# Patient Record
Sex: FEMALE | Race: WHITE | NOT HISPANIC OR LATINO | Employment: OTHER | URBAN - METROPOLITAN AREA
[De-identification: names, ages, dates, MRNs, and addresses within clinical notes are randomized per-mention and may not be internally consistent; named-entity substitution may affect disease eponyms.]

---

## 2017-01-18 ENCOUNTER — OFFICE VISIT (OUTPATIENT)
Dept: ENDOCRINOLOGY | Facility: CLINIC | Age: 62
End: 2017-01-18

## 2017-01-18 VITALS
BODY MASS INDEX: 40.66 KG/M2 | WEIGHT: 253 LBS | HEIGHT: 66 IN | OXYGEN SATURATION: 95 % | SYSTOLIC BLOOD PRESSURE: 136 MMHG | DIASTOLIC BLOOD PRESSURE: 80 MMHG | HEART RATE: 68 BPM

## 2017-01-18 DIAGNOSIS — E55.9 VITAMIN D DEFICIENCY: ICD-10-CM

## 2017-01-18 DIAGNOSIS — D35.02 ADRENAL CORTICAL ADENOMA OF LEFT ADRENAL GLAND: ICD-10-CM

## 2017-01-18 DIAGNOSIS — E11.9 CONTROLLED TYPE 2 DIABETES MELLITUS WITHOUT COMPLICATION, WITHOUT LONG-TERM CURRENT USE OF INSULIN (HCC): ICD-10-CM

## 2017-01-18 DIAGNOSIS — I10 ESSENTIAL HYPERTENSION: ICD-10-CM

## 2017-01-18 DIAGNOSIS — J41.0 SIMPLE CHRONIC BRONCHITIS (HCC): ICD-10-CM

## 2017-01-18 DIAGNOSIS — R60.0 LOCALIZED EDEMA: Primary | ICD-10-CM

## 2017-01-18 PROCEDURE — 99214 OFFICE O/P EST MOD 30 MIN: CPT | Performed by: INTERNAL MEDICINE

## 2017-01-18 RX ORDER — ALBUTEROL SULFATE 90 UG/1
2 AEROSOL, METERED RESPIRATORY (INHALATION) EVERY 4 HOURS PRN
Qty: 1 INHALER | Refills: 2 | Status: SHIPPED | OUTPATIENT
Start: 2017-01-18 | End: 2018-07-25 | Stop reason: ALTCHOICE

## 2017-01-18 NOTE — PROGRESS NOTES
Chief complaint  Adrenal Problem (F/u for adrenal adenoma and type 2 diabetes, not currently checking blood sugars. ) and Diabetes    Subjective   Anny Santos is a 61 y.o. female is here today for follow-up.  Follow-up for adrenal tumor. Patient has adrenal incidentaloma found on CT scan ordered for pulmonary nodules on dec 2012. Repeat CT scan 6 months later showed unchanged 3.5 cm adrenal adenoma with benign radiologic characteristics.   Last CT adrenal 10/21/2016 - Stable 3.7 cm low-density  left adrenal nodule. Hounsfield units again average less than 10. The  kidneys are unremarkable in appearance.  Adrenal Mass: The patient is being seen for a routine clinic follow-up of an adrenal mass. She was referred by a primary care provider. The adrenal mass has been visualized on abdominal CT. The mass is 3.5 cm in size and located in the left adrenal gland. The mass has a benign imaging phenotype.  Symptoms: weakness, palpitations, lightheadedness and dyspnea, but no headache and no rash    The patient presents with complaints of gradual onset of moderate facial, chin and upper lip hirsutism (started at menopause. Patient removes hair every other day). The patient is currently experiencing symptoms. Pertinent medical history: impaired glucose tolerance, hypertension and hyperlipidemia.     Patient has multiple medical problems, including hypertension, hyperlipidemia, asthma with COPD, SVT, impaired glucose tolerance. Patient took high doses of prednisone in 2013, which resulted in significant weight gain, uncontrolled hypertension and worsening edema.     Patient had pacemaker placement and BP is controlled on current regimen, reviewed log book. .Her diabetes is well controlled. Last A1C 6.2  She reported that her control is likely worsened because of dietary non compliance.     C/o cough with white sputum production. It persisted for 3 weeks since the last URI>     Diabetes   She presents for her follow-up  diabetic visit. She has type 2 diabetes mellitus. Associated symptoms include fatigue and weakness.       Medications    Current Outpatient Prescriptions:   •  albuterol (PROVENTIL HFA;VENTOLIN HFA) 108 (90 BASE) MCG/ACT inhaler, Inhale 2 puffs Every 4 (Four) Hours As Needed for wheezing., Disp: 1 inhaler, Rfl: 2  •  aspirin 325 MG tablet, Take 1 tablet by mouth daily., Disp: , Rfl:   •  atorvastatin (LIPITOR) 40 MG tablet, Take  by mouth., Disp: , Rfl:   •  Cholecalciferol (VITAMIN D-3 PO), Take  by mouth., Disp: , Rfl:   •  Coenzyme Q10 (COQ-10) 10 MG capsule, Take  by mouth., Disp: , Rfl:   •  Cyanocobalamin (VITAMIN B-12 PO), Take  by mouth., Disp: , Rfl:   •  diphenhydrAMINE (BENADRYL) 50 MG tablet, Take 1 tablet 1 hour before the procedure., Disp: 1 tablet, Rfl: 0  •  fluticasone (FLONASE) 50 MCG/ACT nasal spray, 2 sprays into each nostril daily. Administer 2 sprays in each nostril for each dose., Disp: , Rfl:   •  furosemide (LASIX) 20 MG tablet, Take 0.5 tablets by mouth 3 (three) times a day., Disp: , Rfl:   •  glucose blood (ACCU-CHEK EWELINA PLUS) test strip, Use 2-3 times a week, Disp: , Rfl:   •  ipratropium-albuterol (DUO-NEB) 0.5-2.5 mg/mL nebulizer, Administer one 3 ML vial 4 times daily via Nebulization as needed for shortness of breath, wheezing, Disp: , Rfl:   •  magnesium oxide (MAG-OX) 400 MG tablet, Take 1 tablet by mouth 2 (two) times a day., Disp: , Rfl:   •  metFORMIN (GLUCOPHAGE) 500 MG tablet, Take 1 tablet by mouth daily with breakfast., Disp: 90 tablet, Rfl: 3  •  metoclopramide (REGLAN) 5 MG tablet, Take  by mouth., Disp: , Rfl:   •  metolazone (ZAROXOLYN) 5 MG tablet, Take 1 tablet by mouth See Admin Instructions. Monday Wednesday and Friday, Disp: 20 tablet, Rfl: 5  •  Omega-3 Fatty Acids (FISH OIL PO), Take  by mouth., Disp: , Rfl:   •  potassium chloride (K-DUR,KLOR-CON) 10 MEQ CR tablet, Take 1 tablet by mouth 3 (Three) Times a Day., Disp: 270 tablet, Rfl: 1  •  predniSONE  "(DELTASONE) 50 MG tablet, Take 1 tablet 13 hours before procedure, 1 tablet 7 hours before procedure, 1 tablet 1 hour before procedure., Disp: 3 tablet, Rfl: 0  •  sotalol (BETAPACE AF) 80 MG tablet tablet, Take  by mouth., Disp: , Rfl:   •  Tiotropium Bromide Monohydrate (SPIRIVA RESPIMAT) 2.5 MCG/ACT aerosol solution, 2 puffs daily., Disp: , Rfl:   •  VITAMIN D, ERGOCALCIFEROL, PO, Take 5,000 mg by mouth daily., Disp: , Rfl:     PMH  The following portions of the patient's history were reviewed and updated as appropriate: allergies, current medications, past family history, past medical history, past social history, past surgical history and problem list.    Review of systems  Review of Systems   Constitutional: Positive for fatigue.   Respiratory: Positive for cough (after URI) and shortness of breath.    Cardiovascular: Positive for palpitations and leg swelling.   Gastrointestinal: Positive for constipation.   Musculoskeletal: Positive for gait problem.   Neurological: Positive for weakness.       Physical exam  Objective   Blood pressure 136/80, pulse 68, height 66\" (167.6 cm), weight 253 lb (115 kg), SpO2 95 %.   Physical Exam   Constitutional: She is oriented to person, place, and time. She appears well-developed and well-nourished.   HENT:   Head: Normocephalic and atraumatic.   Eyes: Conjunctivae are normal.   Neck: No thyromegaly present.   The neck is supple and free of adenopathy or masses, the thyroid is normal without enlargement or nodules.   Cardiovascular: Normal rate, regular rhythm and normal heart sounds.    Pulmonary/Chest: Effort normal.   Decreased air entry bilaterally   Musculoskeletal: She exhibits no edema.   Lymphadenopathy:     She has no cervical adenopathy.   Neurological: She is alert and oriented to person, place, and time.   Skin: Skin is warm and dry. No rash noted.   Psychiatric: She has a normal mood and affect. Thought content normal.   Vitals reviewed.        LABS AND " IMAGING    Results for orders placed or performed in visit on 10/21/16   POC Glycosylated Hemoglobin (Hb A1C)   Result Value Ref Range    Hemoglobin A1C 6.2 %   POC Glucose Fingerstick   Result Value Ref Range    Glucose 143 (A) 70 - 130 mg/dL          Assessment  Assessment/Plan   1. Localized edema    2. Adrenal cortical adenoma of left adrenal gland    3. Vitamin D deficiency    4. Simple chronic bronchitis    5. Essential hypertension    6. Controlled type 2 diabetes mellitus without complication, without long-term current use of insulin      No orders of the defined types were placed in this encounter.    Plan  Cont metolazone 5 mg Mon Wed Fri, lasix 40 mg a day. Potassium 3 tabs.  She is doing better now and numbers of BP improved.  Log book reviewed. Advised to elevate legs and keep sodium intake down.     Diabetes mellitus type 2 is well controlled on diet and metformin 500 mg daily. Repeat A1C and will notify if needs to increase the dose.     Adrenal left nodule - repeat CT without contrast with the special attention to the adrenal and assessment of Hounsfield units and size of the mass - stable 3.5 cm adrenal adenoma with low Hounsfield units.   Biochemical panel was negative 5/2015. Will obtain before next visit.   Follow-up in 8-12 months.

## 2017-01-18 NOTE — MR AVS SNAPSHOT
Anny CRAFT Santos   1/18/2017 3:15 PM   Office Visit    Dept Phone:  [phone]   Encounter #:  [id number]    Provider:  Alejandra SCHULZ MD   Department:  [facility] INTERNAL MEDICINE AND ENDOCRINOLOGY                Your Full Care Plan              Today's Medication Changes          These changes are accurate as of: 1/18/17  4:19 PM.  If you have any questions, ask your nurse or doctor.               New Medication(s)Ordered:     albuterol 108 (90 BASE) MCG/ACT inhaler   Commonly known as:  PROVENTIL HFA;VENTOLIN HFA   Inhale 2 puffs Every 4 (Four) Hours As Needed for wheezing.   Started by:  Alejandra SCHULZ MD         Stop taking medication(s)listed here:     lisinopril 40 MG tablet   Commonly known as:  PRINIVIL,ZESTRIL   Stopped by:  Alejandra SCHULZ MD                Where to Get Your Medications      These medications were sent to [facility] - [facility] [address] [facility] [phone] [facility] [phone]   [address] [facility] [address]     Phone:  [phone]     albuterol 108 (90 BASE) MCG/ACT inhaler                  Your Updated Medication List          This list is accurate as of: 1/18/17  4:19 PM.  Always use your most recent med list.                ACCU-CHEK EWELINA PLUS test strip   Generic drug:  glucose blood       albuterol 108 (90 BASE) MCG/ACT inhaler   Commonly known as:  PROVENTIL HFA;VENTOLIN HFA   Inhale 2 puffs Every 4 (Four) Hours As Needed for wheezing.       aspirin 325 MG tablet       atorvastatin 40 MG tablet   Commonly known as:  LIPITOR       CoQ-10 10 MG capsule       diphenhydrAMINE 50 MG tablet   Commonly known as:  BENADRYL   Take 1 tablet 1 hour before the procedure.       FISH OIL PO       fluticasone 50 MCG/ACT nasal spray   Commonly known as:  FLONASE       furosemide 20 MG tablet   Commonly known as:  LASIX       ipratropium-albuterol 0.5-2.5 mg/mL nebulizer   Commonly known as:  DUO-NEB       magnesium oxide 400 MG tablet   Commonly known as:  MAG-OX       metFORMIN 500 MG tablet   Commonly known as:  GLUCOPHAGE   Take 1 tablet by mouth daily with breakfast.       metoclopramide 5 MG tablet   Commonly known as:  REGLAN       metOLazone 5 MG tablet   Commonly known as:  ZAROXOLYN   Take 1 tablet by mouth See Admin Instructions. Monday Wednesday and Friday       potassium chloride 10 MEQ CR tablet   Commonly known as:  K-DUR,KLOR-CON   Take 1 tablet by mouth 3 (Three) Times a Day.       predniSONE 50 MG tablet   Commonly known as:  DELTASONE   Take 1 tablet 13 hours before procedure, 1 tablet 7 hours before procedure, 1 tablet 1 hour before procedure.       sotalol 80 MG tablet tablet   Commonly known as:  BETAPACE AF       SPIRIVA RESPIMAT 2.5 MCG/ACT aerosol solution   Generic drug:  Tiotropium Bromide Monohydrate       VITAMIN B-12 PO       VITAMIN D (ERGOCALCIFEROL) PO       VITAMIN D-3 PO               You Were Diagnosed With        Codes Comments    Localized edema    -  Primary ICD-10-CM: R60.0  ICD-9-CM: 782.3     Adrenal cortical adenoma of left adrenal gland     ICD-10-CM: D35.02  ICD-9-CM: 227.0     Vitamin D deficiency     ICD-10-CM: E55.9  ICD-9-CM: 268.9     Simple chronic bronchitis     ICD-10-CM: J41.0  ICD-9-CM: 491.0     Essential hypertension     ICD-10-CM: I10  ICD-9-CM: 401.9     Controlled type 2 diabetes mellitus without complication, without long-term current use of insulin     ICD-10-CM: E11.9  ICD-9-CM: 250.00       Instructions     None    Patient Instructions History      Upcoming Appointments     Visit Type Date Time Department    FOLLOW UP 1/18/2017  3:15 PM [facility]    FOLLOW UP 7/20/2017  9:00 AM [facility]     Our records indicate that you have an active [employer] account.    You can view your After Visit Summary by going to [employer] and logging in with your [employer] username and password.  If you don't have a [employer]  "username and password but a parent or guardian has access to your record, the parent or guardian should login with their own [employer] username and password and access your record to view the After Visit Summary.    If you have questions, you can email [email or web address] or call [phone] to talk to our [employer] staff.  Remember, [employer] is NOT to be used for urgent needs.  For medical emergencies, dial 911.               Other Info from Your Visit           Your Appointments     Jul 20, 2017  9:00 AM EDT   Follow Up with Alejandra SCHULZ MD   [facility] INTERNAL MEDICINE AND ENDOCRINOLOGY (--)    [address] [address] [phone]           Arrive 15 minutes prior to appointment.              Allergies     Fish-derived Products      Iodinated Diagnostic Agents  Itching    Minocin [Minocycline Hcl]      Other      Albuterol  Palpitations      Reason for Visit     Adrenal Problem F/u for adrenal adenoma and type 2 diabetes, not currently checking blood sugars.     Diabetes           Vital Signs     Blood Pressure Pulse Height Weight Oxygen Saturation Body Mass Index    136/80 68 66\" (167.6 cm) 253 lb (115 kg) 95% 40.84 kg/m2    Smoking Status                   Former Smoker           Problems and Diagnoses Noted     Adrenal cortical adenoma of left adrenal gland    Chronic bronchitis    Controlled type 2 diabetes mellitus without complication, without long-term current use of insulin    High blood pressure    Accumulation of fluid in tissues    Vitamin D deficiency        "

## 2017-02-15 RX ORDER — FUROSEMIDE 20 MG/1
10 TABLET ORAL 3 TIMES DAILY
Qty: 90 TABLET | Refills: 1 | Status: SHIPPED | OUTPATIENT
Start: 2017-02-15 | End: 2017-02-23 | Stop reason: ALTCHOICE

## 2017-02-23 ENCOUNTER — TELEPHONE (OUTPATIENT)
Dept: ENDOCRINOLOGY | Facility: CLINIC | Age: 62
End: 2017-02-23

## 2017-02-23 RX ORDER — FUROSEMIDE 20 MG/1
20 TABLET ORAL 3 TIMES DAILY
Qty: 270 TABLET | Refills: 1 | Status: SHIPPED | OUTPATIENT
Start: 2017-02-23 | End: 2017-03-21 | Stop reason: SDUPTHER

## 2017-02-23 NOTE — TELEPHONE ENCOUNTER
"----- Message from Alejandra SCHULZ MD sent at 2/23/2017  1:45 PM EST -----  Regarding: FW: Prescription Question  Contact: [phone]  I believe it was 1/2 tab of 40 mg daily. Could you please send her correct dose 20 mg 1 tab 3x daily  ----- Message -----     From: Anny Santos     Sent: 2/22/2017   4:55 PM       To: Alejandra SCHULZ MD  Subject: Prescription Question                            I called a couple of weeks ago and ask for my Lasix to be called in. I take Lasix 20 mg. 1 tab three times a day. When I got the medicine just now it said, \"Take 1/2 tablet three times a day.\"  Did Dr. Vela decrease my dosage? I only got 1/2 the pills I needed to last 90 days. Thank you so much. Anny Santos 1955. Member #[id number].    "

## 2017-03-21 RX ORDER — FUROSEMIDE 20 MG/1
20 TABLET ORAL 3 TIMES DAILY
Qty: 270 TABLET | Refills: 0 | Status: SHIPPED | OUTPATIENT
Start: 2017-03-21 | End: 2017-03-23 | Stop reason: SDUPTHER

## 2017-03-22 ENCOUNTER — HOSPITAL ENCOUNTER (EMERGENCY)
Facility: HOSPITAL | Age: 62
Discharge: HOME | End: 2017-03-22
Attending: EMERGENCY MEDICINE | Admitting: EMERGENCY MEDICINE

## 2017-03-22 VITALS
TEMPERATURE: 99.6 F | DIASTOLIC BLOOD PRESSURE: 65 MMHG | RESPIRATION RATE: 18 BRPM | HEIGHT: 65 IN | WEIGHT: 251 LBS | OXYGEN SATURATION: 92 % | HEART RATE: 74 BPM | BODY MASS INDEX: 41.82 KG/M2 | SYSTOLIC BLOOD PRESSURE: 112 MMHG

## 2017-03-22 DIAGNOSIS — E87.6 HYPOKALEMIA: Primary | ICD-10-CM

## 2017-03-22 LAB
ALBUMIN SERPL-MCNC: 4.5 G/DL (ref 3.5–5)
ALBUMIN SERPL-MCNC: 4.7 G/DL (ref 3.5–5)
ALBUMIN/GLOB SERPL: 1.3 G/DL (ref 1–2)
ALBUMIN/GLOB SERPL: 1.5 G/DL (ref 1–2)
ALP SERPL-CCNC: 78 U/L (ref 38–126)
ALP SERPL-CCNC: 81 U/L (ref 38–126)
ALT SERPL W P-5'-P-CCNC: 26 U/L (ref 13–69)
ALT SERPL W P-5'-P-CCNC: 27 U/L (ref 13–69)
ANION GAP SERPL CALCULATED.3IONS-SCNC: 17.5 MMOL/L
ANION GAP SERPL CALCULATED.3IONS-SCNC: 9.7 MMOL/L
AST SERPL-CCNC: 22 U/L (ref 15–46)
AST SERPL-CCNC: 37 U/L (ref 15–46)
BILIRUB SERPL-MCNC: 1.2 MG/DL (ref 0.2–1.3)
BILIRUB SERPL-MCNC: 1.6 MG/DL (ref 0.2–1.3)
BUN BLD-MCNC: 22 MG/DL (ref 7–20)
BUN BLD-MCNC: 22 MG/DL (ref 7–20)
BUN/CREAT SERPL: 22 (ref 7.1–23.5)
BUN/CREAT SERPL: 27.5 (ref 7.1–23.5)
CALCIUM SPEC-SCNC: 10.3 MG/DL (ref 8.4–10.2)
CALCIUM SPEC-SCNC: 10.5 MG/DL (ref 8.4–10.2)
CHLORIDE SERPL-SCNC: 84 MMOL/L (ref 98–107)
CHLORIDE SERPL-SCNC: 84 MMOL/L (ref 98–107)
CO2 SERPL-SCNC: 37 MMOL/L (ref 26–30)
CO2 SERPL-SCNC: 42 MMOL/L (ref 26–30)
CREAT BLD-MCNC: 0.8 MG/DL (ref 0.6–1.3)
CREAT BLD-MCNC: 1 MG/DL (ref 0.6–1.3)
GFR SERPL CREATININE-BSD FRML MDRD: 56 ML/MIN/1.73
GFR SERPL CREATININE-BSD FRML MDRD: 72 ML/MIN/1.73
GLOBULIN UR ELPH-MCNC: 3.1 GM/DL
GLOBULIN UR ELPH-MCNC: 3.5 GM/DL
GLUCOSE BLD-MCNC: 146 MG/DL (ref 74–98)
GLUCOSE BLD-MCNC: 184 MG/DL (ref 74–98)
HOLD SPECIMEN: NORMAL
HOLD SPECIMEN: NORMAL
POTASSIUM BLD-SCNC: 2.7 MMOL/L (ref 3.5–5.1)
POTASSIUM BLD-SCNC: 3.5 MMOL/L (ref 3.5–5.1)
PROT SERPL-MCNC: 7.6 G/DL (ref 6.3–8.2)
PROT SERPL-MCNC: 8.2 G/DL (ref 6.3–8.2)
SODIUM BLD-SCNC: 133 MMOL/L (ref 137–145)
SODIUM BLD-SCNC: 135 MMOL/L (ref 137–145)
WHOLE BLOOD HOLD SPECIMEN: NORMAL
WHOLE BLOOD HOLD SPECIMEN: NORMAL

## 2017-03-22 PROCEDURE — 80053 COMPREHEN METABOLIC PANEL: CPT | Performed by: PHYSICIAN ASSISTANT

## 2017-03-22 PROCEDURE — 99283 EMERGENCY DEPT VISIT LOW MDM: CPT

## 2017-03-22 PROCEDURE — 93005 ELECTROCARDIOGRAM TRACING: CPT | Performed by: PHYSICIAN ASSISTANT

## 2017-03-22 PROCEDURE — 25010000003 POTASSIUM CHLORIDE 10 MEQ/100ML SOLUTION: Performed by: PHYSICIAN ASSISTANT

## 2017-03-22 PROCEDURE — 96365 THER/PROPH/DIAG IV INF INIT: CPT

## 2017-03-22 PROCEDURE — 80053 COMPREHEN METABOLIC PANEL: CPT | Performed by: EMERGENCY MEDICINE

## 2017-03-22 PROCEDURE — 36415 COLL VENOUS BLD VENIPUNCTURE: CPT

## 2017-03-22 PROCEDURE — 96366 THER/PROPH/DIAG IV INF ADDON: CPT

## 2017-03-22 RX ORDER — POTASSIUM CHLORIDE 7.45 MG/ML
10 INJECTION INTRAVENOUS
Status: COMPLETED | OUTPATIENT
Start: 2017-03-22 | End: 2017-03-22

## 2017-03-22 RX ORDER — FAMOTIDINE 20 MG/1
20 TABLET, FILM COATED ORAL NIGHTLY PRN
COMMUNITY
Start: 2017-01-03 | End: 2019-02-19

## 2017-03-22 RX ORDER — VITAMINS A AND D
CAPSULE ORAL
COMMUNITY
Start: 2017-01-03 | End: 2018-07-25

## 2017-03-22 RX ORDER — POTASSIUM CHLORIDE 1.5 G/1.77G
40 POWDER, FOR SOLUTION ORAL ONCE
Status: COMPLETED | OUTPATIENT
Start: 2017-03-22 | End: 2017-03-22

## 2017-03-22 RX ORDER — APIXABAN 5 MG/1
5 TABLET, FILM COATED ORAL EVERY 12 HOURS SCHEDULED
COMMUNITY
Start: 2017-01-12

## 2017-03-22 RX ADMIN — POTASSIUM CHLORIDE 40 MEQ: 1.5 POWDER, FOR SOLUTION ORAL at 19:43

## 2017-03-22 RX ADMIN — POTASSIUM CHLORIDE 10 MEQ: 10 INJECTION, SOLUTION INTRAVENOUS at 19:54

## 2017-03-22 RX ADMIN — POTASSIUM CHLORIDE 10 MEQ: 10 INJECTION, SOLUTION INTRAVENOUS at 20:57

## 2017-03-22 NOTE — ED PROVIDER NOTES
Subjective   HPI Comments: Patient is 61-year-old female who is here today complaining that she was sent here from her primary care secondary to a low potassium value.  Patient states she had low potassium within her last lab draw her potassium was increased from 1 10 mg by mouth twice a day to 20 mg by mouth twice a day.  Physician had patient take 40 of potassium at 2:00 today and told her to come to the emergency room for evaluation.  Patient does not complain of chest pain shortness of breath.      History provided by:  Patient      Review of Systems   All other systems reviewed and are negative.      Past Medical History:   Diagnosis Date   • Abnormal finding on lung imaging    • Adrenal cortical adenoma     A.  3.5 cm water density left adrenal mass consistent with adenoma noted on CT scan of   06/11/2013, unchanged in size from previous scans. Labs 5/2015 - normal cortisol and cats/mets   • Allergic rhinitis    • Anemia    • Arthritis    • Atherosclerosis    • Backache    • Cholelithiasis     2 cm densely calcified gallstone incidentally noted on CT scan of the chest 06/11/2013, asymptomatic.   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    • H/O transfusion of whole blood    • Hypertension    • Impaired fasting glucose    • Ingrown nail    • Iron deficiency    • Lower extremity edema    • Muscle spasm    • Myopia    • Nephrolithiasis    • Osteoarthritis    • Palpitations    • Paroxysmal supraventricular tachycardia    • Sinus problem    • Skin lesion of left lower extremity    • Vitamin B12 deficiency        Allergies   Allergen Reactions   • Fish-Derived Products    • Iodinated Diagnostic Agents Itching   • Minocin [Minocycline Hcl]    • Other    • Albuterol Palpitations       Past Surgical History:   Procedure Laterality Date   • ADENOIDECTOMY     • ANGIOPLASTY   2013   • APPENDECTOMY     • CARDIAC PACEMAKER PLACEMENT     • DILATATION AND CURETTAGE  1971   • EAR TUBES      Ear Pressure Equalization   • LASIK  2007   • OTHER SURGICAL HISTORY      Catheter Ablation Atrial supraventricular Tachycardia   • TONSILLECTOMY         Family History   Problem Relation Age of Onset   • Liver disease Mother    • Inflammatory bowel disease Mother    • Liver disease Father    • Liver disease Brother    • Arthritis Other    • Cancer Other    • Heart disease Other    • Hypertension Other    • Obesity Other    • Migraines Other    • Stroke Other    • Tuberculosis Other        Social History     Social History   • Marital status: Single     Spouse name: N/A   • Number of children: N/A   • Years of education: N/A     Social History Main Topics   • Smoking status: Former Smoker     Quit date: 1/1/2014   • Smokeless tobacco: None   • Alcohol use No   • Drug use: No   • Sexual activity: Not Asked     Other Topics Concern   • None     Social History Narrative           Objective   Physical Exam   Constitutional: She is oriented to person, place, and time. She appears well-developed and well-nourished.   HENT:   Head: Normocephalic and atraumatic.   Eyes: EOM are normal. Pupils are equal, round, and reactive to light.   Cardiovascular: Normal rate, regular rhythm and normal heart sounds.  Exam reveals no gallop and no friction rub.    No murmur heard.  Pulmonary/Chest: Effort normal and breath sounds normal. No respiratory distress.   Abdominal: Soft. Bowel sounds are normal.   Musculoskeletal: Normal range of motion.   Neurological: She is alert and oriented to person, place, and time. No cranial nerve deficit.   Skin: Skin is warm and dry.   Psychiatric: She has a normal mood and affect. Her behavior is normal. Thought content normal.       Procedures         ED Course  ED Course        Throughout the emergency room course.  Patient's EKG  Patient's potassium was rechecked.  Pt was found to have K  of 2.7.  Pt was given 20 IV K and additional 40 POK. Pt will take 20 mg tomorrow am as directed by her primary and will call primary tomorrow for further instructions.           MDM  Number of Diagnoses or Management Options  Hypokalemia:       Final diagnoses:   Hypokalemia            Tereza Valadez PA-C  03/22/17 9095

## 2017-03-23 RX ORDER — FUROSEMIDE 20 MG/1
20 TABLET ORAL 3 TIMES DAILY
Qty: 270 TABLET | Refills: 1 | Status: SHIPPED | OUTPATIENT
Start: 2017-03-23 | End: 2017-10-02 | Stop reason: SDUPTHER

## 2017-03-23 NOTE — DISCHARGE INSTRUCTIONS
Follow with primary care tomorrow.  Return for worsening symptoms.  Pt will continue with 20 meq of K BID until she is given new instructions by PCP

## 2017-07-17 ENCOUNTER — OFFICE VISIT (OUTPATIENT)
Dept: ENDOCRINOLOGY | Facility: CLINIC | Age: 62
End: 2017-07-17

## 2017-07-17 VITALS
HEIGHT: 65 IN | SYSTOLIC BLOOD PRESSURE: 136 MMHG | DIASTOLIC BLOOD PRESSURE: 80 MMHG | OXYGEN SATURATION: 96 % | WEIGHT: 234 LBS | BODY MASS INDEX: 38.99 KG/M2 | HEART RATE: 74 BPM

## 2017-07-17 DIAGNOSIS — E09.9 STEROID-INDUCED DIABETES MELLITUS (HCC): ICD-10-CM

## 2017-07-17 DIAGNOSIS — Z91.041 CONTRAST MEDIA ALLERGY: ICD-10-CM

## 2017-07-17 DIAGNOSIS — E55.9 VITAMIN D DEFICIENCY: ICD-10-CM

## 2017-07-17 DIAGNOSIS — D35.02 ADRENAL CORTICAL ADENOMA OF LEFT ADRENAL GLAND: ICD-10-CM

## 2017-07-17 DIAGNOSIS — E11.9 CONTROLLED TYPE 2 DIABETES MELLITUS WITHOUT COMPLICATION, WITHOUT LONG-TERM CURRENT USE OF INSULIN (HCC): Primary | ICD-10-CM

## 2017-07-17 DIAGNOSIS — R73.01 IMPAIRED FASTING GLUCOSE: ICD-10-CM

## 2017-07-17 DIAGNOSIS — T38.0X5A STEROID-INDUCED DIABETES MELLITUS (HCC): ICD-10-CM

## 2017-07-17 DIAGNOSIS — R60.0 LOCALIZED EDEMA: ICD-10-CM

## 2017-07-17 LAB
GLUCOSE BLDC GLUCOMTR-MCNC: 114 MG/DL (ref 70–130)
HBA1C MFR BLD: 5.1 %

## 2017-07-17 PROCEDURE — 82947 ASSAY GLUCOSE BLOOD QUANT: CPT | Performed by: INTERNAL MEDICINE

## 2017-07-17 PROCEDURE — 99214 OFFICE O/P EST MOD 30 MIN: CPT | Performed by: INTERNAL MEDICINE

## 2017-07-17 PROCEDURE — 83036 HEMOGLOBIN GLYCOSYLATED A1C: CPT | Performed by: INTERNAL MEDICINE

## 2017-07-18 ENCOUNTER — RESULTS ENCOUNTER (OUTPATIENT)
Dept: ENDOCRINOLOGY | Facility: CLINIC | Age: 62
End: 2017-07-18

## 2017-07-18 DIAGNOSIS — D35.02 ADRENAL CORTICAL ADENOMA OF LEFT ADRENAL GLAND: ICD-10-CM

## 2017-07-21 ENCOUNTER — TELEPHONE (OUTPATIENT)
Dept: ENDOCRINOLOGY | Facility: CLINIC | Age: 62
End: 2017-07-21

## 2017-07-21 NOTE — TELEPHONE ENCOUNTER
----- Message from Alejandra SCHULZ MD sent at 7/17/2017  9:20 AM EDT -----  Obtain rediology records/ CT from [facility] (DR [place])

## 2017-07-24 LAB
ALBUMIN SERPL-MCNC: 4.1 G/DL (ref 3.2–4.8)
ALBUMIN/GLOB SERPL: 1.4 G/DL (ref 1.5–2.5)
ALDOST SERPL-MCNC: 11 NG/DL
ALDOST SERPL-MCNC: 13.3 NG/DL (ref 0–30)
ALDOST/RENIN PLAS-RTO: 3.9 {RATIO} (ref 0–30)
ALP SERPL-CCNC: 73 U/L (ref 25–100)
ALT SERPL-CCNC: 19 U/L (ref 7–40)
AST SERPL-CCNC: 20 U/L (ref 0–33)
BILIRUB SERPL-MCNC: 0.7 MG/DL (ref 0.3–1.2)
BUN SERPL-MCNC: 14 MG/DL (ref 9–23)
BUN/CREAT SERPL: 23.3 (ref 7–25)
CALCIUM SERPL-MCNC: 11.3 MG/DL (ref 8.7–10.4)
CHLORIDE SERPL-SCNC: 88 MMOL/L (ref 99–109)
CO2 SERPL-SCNC: 40 MMOL/L (ref 20–31)
CREAT SERPL-MCNC: 0.6 MG/DL (ref 0.6–1.3)
DOPAMINE SERPL-MCNC: ABNORMAL PG/ML
EPINEPH PLAS-MCNC: 52 PG/ML (ref 0–62)
GLOBULIN SER CALC-MCNC: 3 GM/DL
GLUCOSE SERPL-MCNC: 84 MG/DL (ref 70–100)
METANEPH FREE SERPL-MCNC: 29 PG/ML (ref 0–62)
NOREPINEPH PLAS-MCNC: 922 PG/ML (ref 0–874)
NORMETANEPHRINE SERPL-MCNC: 112 PG/ML (ref 0–145)
POTASSIUM SERPL-SCNC: 3.3 MMOL/L (ref 3.5–5.5)
PROT SERPL-MCNC: 7.1 G/DL (ref 5.7–8.2)
RENIN PLAS-CCNC: 3.37 NG/ML/HR (ref 0.17–5.38)
SODIUM SERPL-SCNC: 135 MMOL/L (ref 132–146)

## 2017-07-26 LAB
CORTIS F 24H UR-MRATE: 6 UG/24 HR (ref 0–50)
CORTIS F UR-MCNC: 2 UG/L
DOPAMINE 24H UR-MRATE: 85 UG/24 HR (ref 0–510)
DOPAMINE UR-MCNC: 40 UG/L
EPINEPH 24H UR-MRATE: 6 UG/24 HR (ref 0–20)
EPINEPH UR-MCNC: 3 UG/L
METANEPH 24H UR-MRATE: 168 UG/24 HR (ref 45–290)
METANEPHS 24H UR-MCNC: 61 UG/L
NOREPINEPH 24H UR-MRATE: 60 UG/24 HR (ref 0–135)
NOREPINEPH UR-MCNC: 28 UG/L
NORMETANEPHRINE 24H UR-MCNC: 195 UG/L
NORMETANEPHRINE 24H UR-MRATE: 536 UG/24 HR (ref 82–500)

## 2017-08-04 ENCOUNTER — TELEPHONE (OUTPATIENT)
Dept: ENDOCRINOLOGY | Facility: CLINIC | Age: 62
End: 2017-08-04

## 2017-08-04 DIAGNOSIS — E83.52 HYPERCALCEMIA: Primary | ICD-10-CM

## 2017-08-04 NOTE — TELEPHONE ENCOUNTER
----- Message from Alejandra SCHULZ MD sent at 8/4/2017 11:58 AM EDT -----  You blood work showed elevated calcium level. Kidney function is normal. Please discontinue any calcium supplements and increase fluid intake. Your potassium is low and you will need to increase potassium supplements by 1 table (if currently takes 4 - incresae to 5) on the day of metholazone administration. Adrenal gland levels are normal. I have seen results of CT of the adrenals and the size of the nodule is unchanged.     I would like you to repeat lab work and I will add high calcium evaluation. Please come for blood work in 1 month.

## 2017-09-12 ENCOUNTER — TELEPHONE (OUTPATIENT)
Dept: INTERNAL MEDICINE | Facility: CLINIC | Age: 62
End: 2017-09-12

## 2017-09-12 NOTE — TELEPHONE ENCOUNTER
PATIENT CALLED TO LET US KNOW THAT ALL OF HER LABS WERE DRAWN AT HER PCP INCLUDING HER POTASSIUM.  HER PCP DR. ROMARIO COVARRUBIAS [phone].  LABS DRAWN ON 9/12/2017.  PATIENT ALSO HAD HER FLU SHOT.  PATIENT REQUESTING TO CALL HER PCP TO GET HER LABS THAT DR. SALINAS.

## 2017-09-13 NOTE — TELEPHONE ENCOUNTER
Sent fax request for copy of labs to be sent to Dr. Vela.  Will have copy of fax request scanned into patient chart.

## 2017-09-28 RX ORDER — METOLAZONE 5 MG/1
5 TABLET ORAL SEE ADMIN INSTRUCTIONS
Qty: 20 TABLET | Refills: 5 | Status: SHIPPED | OUTPATIENT
Start: 2017-09-28 | End: 2018-06-19 | Stop reason: SDUPTHER

## 2017-10-02 RX ORDER — FUROSEMIDE 20 MG/1
20 TABLET ORAL 3 TIMES DAILY
Qty: 270 TABLET | Refills: 1 | Status: SHIPPED | OUTPATIENT
Start: 2017-10-02 | End: 2018-08-01 | Stop reason: SDUPTHER

## 2017-10-02 NOTE — TELEPHONE ENCOUNTER
Pt called stating she needs a refill on the Lasix sent to [facility].

## 2017-10-04 ENCOUNTER — TELEPHONE (OUTPATIENT)
Dept: ENDOCRINOLOGY | Facility: CLINIC | Age: 62
End: 2017-10-04

## 2017-10-04 ENCOUNTER — TELEPHONE (OUTPATIENT)
Dept: INTERNAL MEDICINE | Facility: CLINIC | Age: 62
End: 2017-10-04

## 2017-10-04 RX ORDER — POTASSIUM CHLORIDE 750 MG/1
TABLET, EXTENDED RELEASE ORAL
Qty: 630 TABLET | Refills: 1 | Status: ON HOLD | OUTPATIENT
Start: 2017-10-04 | End: 2019-01-15 | Stop reason: SDUPTHER

## 2017-10-04 NOTE — TELEPHONE ENCOUNTER
TYPED A MESSAGE THROUGH  MY CHART ABOUT A REFILL. DR SALINAS CHANGED THE DIRECTIONS AND PATIENT WAS SHORT. PATIENT IS IN THE DOUGHNUT HOLE AND [employer] PUT HER MEDS ON HOLD UNTIL PAYMENT WAS RECEIVED. DIDN'T HAVE PAYMENT UNTIL THE FIRST OF THE MONTH.  AND  SHE HAS PAID BUT WON'T GET THEM FOR 7 BUSINESS DAYS AND NEEDS SOME TO HOLD HER OVER UNTIL THEY ARRIVE.  SHE HATES TO ASK BUT SHE NEEDS A WEEKS  WORTH OF LASIX IF YOU CAN CALL SOME IN TO [facility].  [facility] [address] [place] [address] [phone] [facility] [phone] FX

## 2017-10-04 NOTE — TELEPHONE ENCOUNTER
----- Message from Alejandra SCHULZ MD sent at 10/2/2017 11:33 PM EDT -----  Regarding: FW: Non-Urgent Medical Question  Contact: [phone]      ----- Message -----     From: Anny Santos     Sent: 10/2/2017   9:35 PM       To: Alejandra SCHULZ MD  Subject: Non-Urgent Medical Question                      I need a new prescription called in for the new way I take my potassium. (The refills I have now call for 4 tablets a day). Dr. Vela has me taking an extra one on the days I take Metolazone, (Monday, Wednesday, and Friday). So I need a prescription for Potassium 10 mEq. Take 4 tablets every day and 5 tablets on Monday, Wednesday, and Friday's.   I just had the potassium level redrawn and sent to her and she did not change me back to the old way, so I know to keep taking them this new way. Thank you! Adia

## 2017-10-23 ENCOUNTER — HOSPITAL ENCOUNTER (OUTPATIENT)
Dept: GENERAL RADIOLOGY | Facility: HOSPITAL | Age: 62
Discharge: HOME | End: 2017-10-23
Attending: FAMILY MEDICINE | Admitting: FAMILY MEDICINE

## 2017-10-23 ENCOUNTER — TRANSCRIBE ORDERS (OUTPATIENT)
Dept: ADMINISTRATIVE | Facility: HOSPITAL | Age: 62
End: 2017-10-23

## 2017-10-23 DIAGNOSIS — M24.812 INTERNAL DERANGEMENT OF LEFT SHOULDER: Primary | ICD-10-CM

## 2017-10-23 PROCEDURE — 73030 X-RAY EXAM OF SHOULDER: CPT

## 2018-03-06 ENCOUNTER — TRANSCRIBE ORDERS (OUTPATIENT)
Dept: ADMINISTRATIVE | Facility: HOSPITAL | Age: 63
End: 2018-03-06

## 2018-03-06 DIAGNOSIS — Z12.31 VISIT FOR SCREENING MAMMOGRAM: Primary | ICD-10-CM

## 2018-03-21 ENCOUNTER — APPOINTMENT (OUTPATIENT)
Dept: MAMMOGRAPHY | Facility: HOSPITAL | Age: 63
End: 2018-03-21
Attending: FAMILY MEDICINE

## 2018-03-28 ENCOUNTER — APPOINTMENT (OUTPATIENT)
Dept: OTHER | Facility: HOSPITAL | Age: 63
End: 2018-03-28
Attending: FAMILY MEDICINE

## 2018-03-28 ENCOUNTER — HOSPITAL ENCOUNTER (OUTPATIENT)
Dept: MAMMOGRAPHY | Facility: HOSPITAL | Age: 63
Discharge: HOME | End: 2018-03-28
Attending: FAMILY MEDICINE | Admitting: FAMILY MEDICINE

## 2018-03-28 DIAGNOSIS — Z92.89 HX OF MAMMOGRAM: ICD-10-CM

## 2018-03-28 DIAGNOSIS — Z12.31 VISIT FOR SCREENING MAMMOGRAM: ICD-10-CM

## 2018-03-28 PROCEDURE — 77067 SCR MAMMO BI INCL CAD: CPT

## 2018-03-28 PROCEDURE — 77063 BREAST TOMOSYNTHESIS BI: CPT

## 2018-03-28 PROCEDURE — 77063 BREAST TOMOSYNTHESIS BI: CPT | Performed by: RADIOLOGY

## 2018-03-28 PROCEDURE — 77067 SCR MAMMO BI INCL CAD: CPT | Performed by: RADIOLOGY

## 2018-04-06 DIAGNOSIS — E09.9 STEROID-INDUCED DIABETES MELLITUS (HCC): ICD-10-CM

## 2018-04-06 DIAGNOSIS — R73.01 IMPAIRED FASTING GLUCOSE: ICD-10-CM

## 2018-04-06 DIAGNOSIS — T38.0X5A STEROID-INDUCED DIABETES MELLITUS (HCC): ICD-10-CM

## 2018-04-17 ENCOUNTER — TELEPHONE (OUTPATIENT)
Dept: ENDOCRINOLOGY | Facility: CLINIC | Age: 63
End: 2018-04-17

## 2018-04-17 DIAGNOSIS — R73.01 IMPAIRED FASTING GLUCOSE: ICD-10-CM

## 2018-04-17 DIAGNOSIS — E09.9 STEROID-INDUCED DIABETES MELLITUS (HCC): ICD-10-CM

## 2018-04-17 DIAGNOSIS — T38.0X5A STEROID-INDUCED DIABETES MELLITUS (HCC): ICD-10-CM

## 2018-04-17 NOTE — TELEPHONE ENCOUNTER
"----- Message from Alejandra SCHULZ MD sent at 4/17/2018 10:27 AM EDT -----  Regarding: FW: Prescription Question  Contact: [phone]  Could you send her rx as requested  ----- Message -----  From: Anny Santos  Sent: 4/10/2018   6:52 PM  To: Alejandra SCHULZ MD  Subject: Prescription Question                            Dr. Vela, when I brought those lab results in the other day, I don't remember if I told the girl to call the Metformin I to [facility] or not. Since I'm out of the \"donut hole\" I can get them through my mail order now. They say that they have not received a fax yet, so I thought I'd check. Thank you so much. Can't wait until I see you in July. (This is the first time I've had to wait a year between visits). (Smile)   Also, I'm now on an Iron pill every day for the anemia. My Magnesium is increased to 1 tablet TID, and I take the Potassium like you told me. He said NOT to skip the Potassium too when I hold the Lasix (which I have done occasionally). I'll see what he says this week when he reviews the labs I had drawn the other day. Will mail, or bring, you a copy of those as soon as I get them.   Thank you for everything. Hope you're doing well. Anny Santos    "

## 2018-06-19 RX ORDER — METOLAZONE 5 MG/1
5 TABLET ORAL SEE ADMIN INSTRUCTIONS
Qty: 20 TABLET | Refills: 0 | Status: SHIPPED | OUTPATIENT
Start: 2018-06-19 | End: 2018-07-20 | Stop reason: SDUPTHER

## 2018-07-05 DIAGNOSIS — R60.0 LOCALIZED EDEMA: Primary | ICD-10-CM

## 2018-07-05 DIAGNOSIS — R06.09 OTHER FORMS OF DYSPNEA: ICD-10-CM

## 2018-07-08 DIAGNOSIS — R60.0 LOCALIZED EDEMA: ICD-10-CM

## 2018-07-24 RX ORDER — METOLAZONE 5 MG/1
TABLET ORAL
Qty: 20 TABLET | Refills: 0 | Status: SHIPPED | OUTPATIENT
Start: 2018-07-24 | End: 2018-10-09 | Stop reason: SDUPTHER

## 2018-07-25 ENCOUNTER — OFFICE VISIT (OUTPATIENT)
Dept: ENDOCRINOLOGY | Facility: CLINIC | Age: 63
End: 2018-07-25

## 2018-07-25 VITALS
HEART RATE: 58 BPM | SYSTOLIC BLOOD PRESSURE: 116 MMHG | DIASTOLIC BLOOD PRESSURE: 64 MMHG | WEIGHT: 235.4 LBS | HEIGHT: 65 IN | OXYGEN SATURATION: 96 % | BODY MASS INDEX: 39.22 KG/M2

## 2018-07-25 DIAGNOSIS — R60.0 LOCALIZED EDEMA: ICD-10-CM

## 2018-07-25 DIAGNOSIS — E11.9 CONTROLLED TYPE 2 DIABETES MELLITUS WITHOUT COMPLICATION, WITHOUT LONG-TERM CURRENT USE OF INSULIN (HCC): Primary | ICD-10-CM

## 2018-07-25 DIAGNOSIS — R32 URINARY INCONTINENCE, UNSPECIFIED TYPE: Primary | ICD-10-CM

## 2018-07-25 DIAGNOSIS — E83.52 HYPERCALCEMIA: ICD-10-CM

## 2018-07-25 DIAGNOSIS — E87.6 HYPOKALEMIA: ICD-10-CM

## 2018-07-25 DIAGNOSIS — R06.09 OTHER FORMS OF DYSPNEA: ICD-10-CM

## 2018-07-25 DIAGNOSIS — D35.00 ADRENAL CORTICAL ADENOMA, UNSPECIFIED LATERALITY: ICD-10-CM

## 2018-07-25 DIAGNOSIS — I10 ESSENTIAL HYPERTENSION: ICD-10-CM

## 2018-07-25 LAB — GLUCOSE BLDC GLUCOMTR-MCNC: 145 MG/DL (ref 70–130)

## 2018-07-25 PROCEDURE — 99214 OFFICE O/P EST MOD 30 MIN: CPT | Performed by: INTERNAL MEDICINE

## 2018-07-25 PROCEDURE — 82947 ASSAY GLUCOSE BLOOD QUANT: CPT | Performed by: INTERNAL MEDICINE

## 2018-07-25 NOTE — PROGRESS NOTES
Chief complaint  Diabetes (Follow UP )    Subjective   Anny Santos is a 63 y.o. female is here today for follow-up.  Follow-up for adrenal tumor. Patient has adrenal incidentaloma found on CT scan ordered for pulmonary nodules on dec 2012. Repeat CT scan 6 months later showed unchanged 3.5 cm adrenal adenoma with benign radiologic characteristics.   CT adrenal 10/21/2016 - Stable 3.7 cm low-density left adrenal nodule. Hounsfield units less than 10.   She underwent CT chest and adrenals in 2018 and images were reviewed. Report is not available.       Patient has multiple medical problems, including hypertension, hyperlipidemia, asthma with COPD, SVT, impaired glucose tolerance, uncontrolled hypertension and worsening edema.     Diabetes mellitus type 2 Her diabetes is well controlled. Last A1C 6.2       Patient c/o swelling in the feet intermittently. She had episode when swelling went down and then she developed hyponatremia and hypokalemia. She started eating salt and held lasix for 1-2 days, sx normalized.   She also c/o urinary stress incontinence and urinary retention at times. Would like to see urologist.     Diabetes   She presents for her follow-up diabetic visit. She has type 2 diabetes mellitus. Associated symptoms include fatigue and weakness.       Medications    Current Outpatient Prescriptions:   •  atorvastatin (LIPITOR) 40 MG tablet, Take  by mouth., Disp: , Rfl:   •  Coenzyme Q10 (COQ-10) 10 MG capsule, Take  by mouth., Disp: , Rfl:   •  Cyanocobalamin (VITAMIN B-12 PO), Take  by mouth., Disp: , Rfl:   •  ELIQUIS 5 MG tablet tablet, , Disp: , Rfl:   •  famotidine (PEPCID) 20 MG tablet, , Disp: , Rfl:   •  fluticasone (FLONASE) 50 MCG/ACT nasal spray, 2 sprays into each nostril daily. Administer 2 sprays in each nostril for each dose., Disp: , Rfl:   •  furosemide (LASIX) 20 MG tablet, Take 1 tablet by mouth 3 (Three) Times a Day., Disp: 270 tablet, Rfl: 1  •  glucose blood (ACCU-CHEK EWELINA PLUS)  "test strip, Use 2-3 times a week, Disp: , Rfl:   •  ipratropium-albuterol (DUO-NEB) 0.5-2.5 mg/mL nebulizer, Administer one 3 ML vial 4 times daily via Nebulization as needed for shortness of breath, wheezing, Disp: , Rfl:   •  magnesium oxide (MAG-OX) 400 MG tablet, Take 1 tablet by mouth 2 (two) times a day., Disp: , Rfl:   •  MAGNESIUM OXIDE, ANTACID, PO, 400 mg., Disp: , Rfl:   •  metFORMIN (GLUCOPHAGE) 500 MG tablet, Take 1 tablet by mouth 2 (Two) Times a Day With Meals., Disp: 180 tablet, Rfl: 3  •  metOLazone (ZAROXOLYN) 5 MG tablet, TAKE 1 TABLET ON MONDAY, WEDNESDAY AND FRIDAY AS DIRECTED, Disp: 20 tablet, Rfl: 0  •  Omega-3 Fatty Acids (FISH OIL PO), Take  by mouth., Disp: , Rfl:   •  potassium chloride (K-DUR,KLOR-CON) 10 MEQ CR tablet, Take 5 tablets on Monday, Wednesday and Friday. Take 4 tablets on Tues, Thurs, Sat and Sunday, Disp: 630 tablet, Rfl: 1  •  sotalol (BETAPACE AF) 80 MG tablet tablet, Take  by mouth., Disp: , Rfl:   •  VITAMIN D, ERGOCALCIFEROL, PO, Take 5,000 mg by mouth daily., Disp: , Rfl:     PMH  The following portions of the patient's history were reviewed and updated as appropriate: allergies, current medications, past family history, past medical history, past social history, past surgical history and problem list.    Review of systems  Review of Systems   Constitutional: Positive for fatigue.   Respiratory: Positive for shortness of breath. Cough: after URI.    Cardiovascular: Positive for palpitations and leg swelling.   Gastrointestinal: Positive for constipation.   Genitourinary: Positive for difficulty urinating, dysuria and urgency.   Musculoskeletal: Positive for gait problem (ambulates with walker).   Neurological: Positive for weakness.       Physical exam  Objective   Blood pressure 116/64, pulse 58, height 165.1 cm (65\"), weight 107 kg (235 lb 6.4 oz), SpO2 96 %.   Physical Exam   Constitutional: She is oriented to person, place, and time. She appears well-developed and " well-nourished.   HENT:   Head: Normocephalic and atraumatic.   Eyes: Conjunctivae are normal.   Neck: No thyromegaly present.   The neck is supple and free of adenopathy or masses, the thyroid is normal without enlargement or nodules.   Cardiovascular: Normal rate, regular rhythm and normal heart sounds.    Pulmonary/Chest: Effort normal.   Decreased air entry bilaterally   Musculoskeletal: She exhibits edema (trace).   Lymphadenopathy:     She has no cervical adenopathy.   Neurological: She is alert and oriented to person, place, and time.   Skin: Skin is warm and dry. No rash noted.   Psychiatric: She has a normal mood and affect. Thought content normal.   Vitals reviewed.        LABS AND IMAGING    Results for orders placed or performed in visit on 07/25/18   POC Glucose Fingerstick   Result Value Ref Range    Glucose 145 (A) 70 - 130 mg/dL      CT adrenal showed 3.6 x 2.8 cm left adrenal nodule. Additional fincings of non calcified lung nodules.     Assessment  Assessment/Plan   1. Controlled type 2 diabetes mellitus without complication, without long-term current use of insulin (CMS/[facility])    2. Adrenal cortical adenoma, unspecified laterality    3. Localized edema    4. Essential hypertension    5. Hypokalemia    6. Hypercalcemia    7. Other forms of dyspnea       Orders Placed This Encounter   Procedures   • POC Glucose Fingerstick     Plan  Cont metolazone 5 mg Mon Wed Fri, lasix  Reduced 40 mg a day in divided doses. Potassium 4 tabs. Discontinue the extra salt in the diet. I have explained that we can achieve the same effect by reducing lasix dose.     Log book reviewed. Advised to elevate legs and keep sodium intake down. If BP is low - call the office  -repeat labs in 2 weeks, she has appt with DR Moreno at 3 weeks.     Diabetes mellitus type 2 is well controlled on diet and metformin 500 mg daily.     Adrenal left nodule - repeat biochemical panel   Reviewed images and will request report of the CT.  ]  Hypercalcemia on several occasions, I will obtain records.   Hyponatremia - will evaluate the cause - likely diuretic effect. Other options SIADH?    Follow-up in 3 months.

## 2018-08-01 RX ORDER — FUROSEMIDE 20 MG/1
20 TABLET ORAL
Qty: 180 TABLET | Refills: 1 | Status: ON HOLD | OUTPATIENT
Start: 2018-08-01 | End: 2019-01-15 | Stop reason: SDUPTHER

## 2018-08-21 DIAGNOSIS — E11.9 CONTROLLED TYPE 2 DIABETES MELLITUS WITHOUT COMPLICATION, WITHOUT LONG-TERM CURRENT USE OF INSULIN (HCC): Primary | ICD-10-CM

## 2018-08-29 DIAGNOSIS — T38.0X5A STEROID-INDUCED DIABETES MELLITUS (HCC): ICD-10-CM

## 2018-08-29 DIAGNOSIS — R73.01 IMPAIRED FASTING GLUCOSE: ICD-10-CM

## 2018-08-29 DIAGNOSIS — E09.9 STEROID-INDUCED DIABETES MELLITUS (HCC): ICD-10-CM

## 2018-09-24 ENCOUNTER — TELEPHONE (OUTPATIENT)
Dept: INTERNAL MEDICINE | Facility: CLINIC | Age: 63
End: 2018-09-24

## 2018-09-25 LAB
25(OH)D3+25(OH)D2 SERPL-MCNC: 54.4 NG/ML
ALBUMIN SERPL ELPH-MCNC: 4 G/DL (ref 2.9–4.4)
ALBUMIN SERPL-MCNC: 4.3 G/DL (ref 3.5–5)
ALBUMIN/GLOB SERPL: 1.4 {RATIO} (ref 0.7–1.7)
ALBUMIN/GLOB SERPL: 1.6 G/DL (ref 1–2)
ALP SERPL-CCNC: 76 U/L (ref 38–126)
ALPHA1 GLOB SERPL ELPH-MCNC: 0.3 G/DL (ref 0–0.4)
ALPHA2 GLOB SERPL ELPH-MCNC: 0.8 G/DL (ref 0.4–1)
ALT SERPL-CCNC: 32 U/L (ref 13–69)
AST SERPL-CCNC: 24 U/L (ref 15–46)
B-GLOBULIN SERPL ELPH-MCNC: 1.2 G/DL (ref 0.7–1.3)
BILIRUB SERPL-MCNC: 0.8 MG/DL (ref 0.2–1.3)
BNP SERPL-MCNC: 90.5 PG/ML (ref 0–100)
BUN SERPL-MCNC: 30 MG/DL (ref 7–20)
BUN/CREAT SERPL: 21.4 (ref 7.1–23.5)
CALCIUM SERPL-MCNC: 12.6 MG/DL (ref 8.4–10.2)
CHLORIDE SERPL-SCNC: 86 MMOL/L (ref 98–107)
CO2 SERPL-SCNC: 38 MMOL/L (ref 26–30)
CREAT SERPL-MCNC: 1.4 MG/DL (ref 0.6–1.3)
GAMMA GLOB SERPL ELPH-MCNC: 0.8 G/DL (ref 0.4–1.8)
GLOBULIN SER CALC-MCNC: 2.7 GM/DL
GLOBULIN SER-MCNC: 3 G/DL (ref 2.2–3.9)
GLUCOSE SERPL-MCNC: 82 MG/DL (ref 74–98)
IGA SERPL-MCNC: 212 MG/DL (ref 87–352)
IGG SERPL-MCNC: 810 MG/DL (ref 700–1600)
IGM SERPL-MCNC: 39 MG/DL (ref 26–217)
INTERPRETATION SERPL IEP-IMP: NORMAL
LABORATORY COMMENT REPORT: NORMAL
M PROTEIN SERPL ELPH-MCNC: NORMAL G/DL
POTASSIUM SERPL-SCNC: 3.8 MMOL/L (ref 3.5–5.1)
PROT SERPL-MCNC: 7 G/DL (ref 6.3–8.2)
PTH-INTACT SERPL-MCNC: 17 PG/ML (ref 15–65)
SODIUM SERPL-SCNC: 133 MMOL/L (ref 137–145)
TSH SERPL DL<=0.005 MIU/L-ACNC: 4.92 MIU/ML (ref 0.47–4.68)

## 2018-09-25 NOTE — TELEPHONE ENCOUNTER
I havent seen the results yet, but notified the patient about high calcium. She has appointment with cardiology. I would likely repeat the levels next week when I see her. Increase fluid intake and take water pill as prescribed by cardiology.

## 2018-10-03 ENCOUNTER — TELEPHONE (OUTPATIENT)
Dept: INTERNAL MEDICINE | Facility: CLINIC | Age: 63
End: 2018-10-03

## 2018-10-03 ENCOUNTER — OFFICE VISIT (OUTPATIENT)
Dept: ENDOCRINOLOGY | Facility: CLINIC | Age: 63
End: 2018-10-03

## 2018-10-03 VITALS
WEIGHT: 245 LBS | BODY MASS INDEX: 40.82 KG/M2 | OXYGEN SATURATION: 95 % | SYSTOLIC BLOOD PRESSURE: 115 MMHG | HEIGHT: 65 IN | DIASTOLIC BLOOD PRESSURE: 65 MMHG | HEART RATE: 61 BPM

## 2018-10-03 DIAGNOSIS — R60.0 LOCALIZED EDEMA: ICD-10-CM

## 2018-10-03 DIAGNOSIS — D35.00 ADRENAL CORTICAL ADENOMA, UNSPECIFIED LATERALITY: ICD-10-CM

## 2018-10-03 DIAGNOSIS — E55.9 VITAMIN D DEFICIENCY: ICD-10-CM

## 2018-10-03 DIAGNOSIS — E11.9 CONTROLLED TYPE 2 DIABETES MELLITUS WITHOUT COMPLICATION, WITHOUT LONG-TERM CURRENT USE OF INSULIN (HCC): Primary | ICD-10-CM

## 2018-10-03 DIAGNOSIS — E83.52 HYPERCALCEMIA: ICD-10-CM

## 2018-10-03 LAB
GLUCOSE BLDC GLUCOMTR-MCNC: 174 MG/DL (ref 70–130)
HBA1C MFR BLD: 5.1 %

## 2018-10-03 PROCEDURE — 99214 OFFICE O/P EST MOD 30 MIN: CPT | Performed by: INTERNAL MEDICINE

## 2018-10-03 PROCEDURE — 82962 GLUCOSE BLOOD TEST: CPT | Performed by: INTERNAL MEDICINE

## 2018-10-03 PROCEDURE — 83036 HEMOGLOBIN GLYCOSYLATED A1C: CPT | Performed by: INTERNAL MEDICINE

## 2018-10-03 RX ORDER — SOTALOL HYDROCHLORIDE 80 MG/1
TABLET ORAL
COMMUNITY
Start: 2018-07-28 | End: 2018-10-03 | Stop reason: SDUPTHER

## 2018-10-03 RX ORDER — METOPROLOL SUCCINATE 50 MG/1
50 TABLET, EXTENDED RELEASE ORAL 2 TIMES DAILY
COMMUNITY
Start: 2018-08-22

## 2018-10-03 RX ORDER — AMIODARONE HYDROCHLORIDE 200 MG/1
200 TABLET ORAL DAILY
COMMUNITY
Start: 2018-09-17

## 2018-10-03 NOTE — TELEPHONE ENCOUNTER
PATIENT RECEIVED A CALL FROM OUR OFFICE THIS MORNING ABOUT TEST RESULTS. SHE IS RETURNING OUR CALL. SHE STATES SHE HAS AN APPOINTMENT THIS AFTERNOON WITH DR. SALINAS. YOU CAN REACH HER BACK -[phone]

## 2018-10-03 NOTE — PROGRESS NOTES
Chief complaint  Diabetes    Subjective   Anny Santos is a 63 y.o. female is here today for follow-up.  Follow-up for adrenal tumor. Patient has adrenal incidentaloma found on CT scan ordered for pulmonary nodules on dec 2012. Repeat CT scan 6 months later showed unchanged 3.5 cm adrenal adenoma with benign radiologic characteristics.   CT adrenal 10/21/2016 - Stable 3.7 cm low-density left adrenal nodule. Hounsfield units less than 10.   She underwent CT chest and adrenals in 2018 and images were reviewed. Report is not available.       Patient has multiple medical problems, including hypertension, hyperlipidemia, asthma with COPD, SVT, impaired glucose tolerance, uncontrolled hypertension and worsening edema.     Diabetes mellitus type 2 Her diabetes is well controlled.     Hypercalcemia with the calcium level of 13 at the time of presenting to the [facility]. She was given IVF. Now has swelling of the feet. She had labs with DR Moreno this morning and I requested results.         Diabetes   She presents for her follow-up diabetic visit. She has type 2 diabetes mellitus. Associated symptoms include fatigue and weakness.       Medications    Current Outpatient Prescriptions:   •  amiodarone (PACERONE) 200 MG tablet, , Disp: , Rfl:   •  atorvastatin (LIPITOR) 40 MG tablet, Take  by mouth., Disp: , Rfl:   •  Coenzyme Q10 (COQ-10) 10 MG capsule, Take  by mouth., Disp: , Rfl:   •  Cyanocobalamin (VITAMIN B-12 PO), Take  by mouth., Disp: , Rfl:   •  ELIQUIS 5 MG tablet tablet, , Disp: , Rfl:   •  famotidine (PEPCID) 20 MG tablet, , Disp: , Rfl:   •  fluticasone (FLONASE) 50 MCG/ACT nasal spray, 2 sprays into each nostril daily. Administer 2 sprays in each nostril for each dose., Disp: , Rfl:   •  furosemide (LASIX) 20 MG tablet, Take 1 tablet by mouth 2 (Two) Times a Day Before Meals. 1 Tab BID in divided doses, Disp: 180 tablet, Rfl: 1  •  glucose blood (ACCU-CHEK EWELINA PLUS) test strip, Use 2-3 times a  "week, Disp: , Rfl:   •  ipratropium-albuterol (DUO-NEB) 0.5-2.5 mg/mL nebulizer, Administer one 3 ML vial 4 times daily via Nebulization as needed for shortness of breath, wheezing, Disp: , Rfl:   •  magnesium oxide (MAG-OX) 400 MG tablet, Take 1 tablet by mouth 2 (two) times a day., Disp: , Rfl:   •  MAGNESIUM OXIDE, ANTACID, PO, 400 mg., Disp: , Rfl:   •  metFORMIN (GLUCOPHAGE) 1000 MG tablet, TAKE 1 TABLET BY MOUTH TWO TIMES A DAY while taking oral steroids, Disp: , Rfl: 2  •  metOLazone (ZAROXOLYN) 5 MG tablet, TAKE 1 TABLET ON MONDAY, WEDNESDAY AND FRIDAY AS DIRECTED, Disp: 20 tablet, Rfl: 0  •  metoprolol succinate XL (TOPROL-XL) 50 MG 24 hr tablet, , Disp: , Rfl:   •  Omega-3 Fatty Acids (FISH OIL PO), Take  by mouth., Disp: , Rfl:   •  potassium chloride (K-DUR,KLOR-CON) 10 MEQ CR tablet, Take 5 tablets on Monday, Wednesday and Friday. Take 4 tablets on Tues, Thurs, Sat and Sunday, Disp: 630 tablet, Rfl: 1  •  sotalol (BETAPACE AF) 80 MG tablet tablet, Take  by mouth., Disp: , Rfl:   •  VITAMIN D, ERGOCALCIFEROL, PO, Take 5,000 mg by mouth daily., Disp: , Rfl:     PMH  The following portions of the patient's history were reviewed and updated as appropriate: allergies, current medications, past family history, past medical history, past social history, past surgical history and problem list.    Review of systems  Review of Systems   Constitutional: Positive for fatigue.   Respiratory: Positive for shortness of breath. Cough: after URI.    Cardiovascular: Positive for palpitations and leg swelling.   Gastrointestinal: Positive for abdominal pain and constipation.   Genitourinary: Positive for difficulty urinating, dysuria and urgency.   Musculoskeletal: Positive for gait problem (ambulates with walker).   Neurological: Positive for weakness.       Physical exam  Objective   Blood pressure 115/65, pulse 61, height 165.1 cm (65\"), weight 111 kg (245 lb), SpO2 95 %.   Physical Exam   Constitutional: She is " oriented to person, place, and time. She appears well-developed and well-nourished.   HENT:   Head: Normocephalic and atraumatic.   Eyes: Conjunctivae are normal.   Neck: No thyromegaly present.   The neck is supple and free of adenopathy or masses, the thyroid is normal without enlargement or nodules.   Cardiovascular: Normal rate, regular rhythm and normal heart sounds.    Pulmonary/Chest: Effort normal.   Decreased air entry bilaterally   Musculoskeletal: She exhibits edema (trace).   Lymphadenopathy:     She has no cervical adenopathy.   Neurological: She is alert and oriented to person, place, and time.   Skin: Skin is warm and dry. No rash noted.   Psychiatric: She has a normal mood and affect. Thought content normal.   Vitals reviewed.        LABS AND IMAGING    Results for orders placed or performed in visit on 10/03/18   POC Glucose Fingerstick   Result Value Ref Range    Glucose 174 (A) 70 - 130 mg/dL   POC Glycosylated Hemoglobin (Hb A1C)   Result Value Ref Range    Hemoglobin A1C 5.1 %      CT adrenal showed 3.6 x 2.8 cm left adrenal nodule. Additional fincings of non calcified lung nodules.     Assessment  Assessment/Plan   1. Controlled type 2 diabetes mellitus without complication, without long-term current use of insulin (CMS/[facility])    2. Adrenal cortical adenoma, unspecified laterality    3. Vitamin D deficiency    4. Localized edema    5. Hypercalcemia      Orders Placed This Encounter   Procedures   • POC Glucose Fingerstick   • POC Glycosylated Hemoglobin (Hb A1C)     Plan  Hypercalcemia - request labs from Dr Moreno. Her last month labs showed normal PTH, high calcium and abnormal renal function. Picture of dehydration from possible overdiuresis. Vit D was normal. She remembers having Tums few days before.   I suspect that hypercalcemia is related to dehydration and renal insufficiency. Other possibilities: hypercalcemia of malignancy, renal insufficiency. PTH is 19 and unlikely to be seen in  primary hyperparathyroidism  Order additional labs for next visit: Vit D metabolytes, PTHrp and BSAP.     Vit D [id number] IU daily continued. Will review labs and make further recommendations.      Diabetes mellitus type 2 is well controlled on diet and metformin 500 mg  2 tab daily.     Adrenal left nodule - repeat biochemical panel   Reviewed images and will request report of the CT.     Hyponatremia - will evaluate the cause - likely diuretic effect.       Follow-up in 3 months.

## 2018-10-04 LAB
CREAT 24H UR-MRATE: 0.64 G/24 HR (ref 0.8–2)
CREAT UR-MCNC: 45.6 MG/DL
METANEPH 24H UR-MRATE: 126 UG/24 HR (ref 45–290)
METANEPHS 24H UR-MCNC: 90 UG/L
NORMETANEPHRINE 24H UR-MCNC: 300 UG/L
NORMETANEPHRINE 24H UR-MRATE: 420 UG/24 HR (ref 82–500)

## 2018-10-05 RX ORDER — METOLAZONE 5 MG/1
TABLET ORAL
Qty: 20 TABLET | Refills: 0 | Status: CANCELLED | OUTPATIENT
Start: 2018-10-05

## 2018-10-05 NOTE — TELEPHONE ENCOUNTER
PATIENT STATES DR. SALINAS WAS GOING TO PRESCRIBE HER A LOW DOSE THYROID MEDICATION. HER PHARMACY HAS NOT RECEIVED INFORMATION ON THE PRESCRIPTION. SHE ALSO NEEDS REFILLS ON THE PRESCRIPTION REQUESTED AS SHE THOUGHT WE WERE CALLING IT IN WHEN SHE WAS HER FOR HER APPOINTMENT.

## 2018-10-09 ENCOUNTER — TELEPHONE (OUTPATIENT)
Dept: ENDOCRINOLOGY | Facility: CLINIC | Age: 63
End: 2018-10-09

## 2018-10-09 DIAGNOSIS — M81.0 OSTEOPOROSIS, UNSPECIFIED OSTEOPOROSIS TYPE, UNSPECIFIED PATHOLOGICAL FRACTURE PRESENCE: ICD-10-CM

## 2018-10-09 DIAGNOSIS — E83.52 HYPERCALCEMIA: Primary | ICD-10-CM

## 2018-10-09 RX ORDER — METOLAZONE 5 MG/1
TABLET ORAL
Qty: 20 TABLET | Refills: 3 | Status: SHIPPED | OUTPATIENT
Start: 2018-10-09 | End: 2019-01-15 | Stop reason: HOSPADM

## 2018-10-09 RX ORDER — LEVOTHYROXINE SODIUM 0.03 MG/1
25 TABLET ORAL DAILY
Qty: 30 TABLET | Refills: 5 | Status: SHIPPED | OUTPATIENT
Start: 2018-10-09 | End: 2019-03-07 | Stop reason: DRUGHIGH

## 2018-10-09 NOTE — TELEPHONE ENCOUNTER
Please advise on Thyroid RX. I have already taken care of the other RX  I also sent a message in staff message

## 2018-11-29 ENCOUNTER — TRANSCRIBE ORDERS (OUTPATIENT)
Dept: ADMINISTRATIVE | Facility: HOSPITAL | Age: 63
End: 2018-11-29

## 2018-11-29 ENCOUNTER — LAB (OUTPATIENT)
Dept: LAB | Facility: HOSPITAL | Age: 63
End: 2018-11-29

## 2018-11-29 DIAGNOSIS — E83.52 HYPERCALCEMIA: ICD-10-CM

## 2018-11-29 DIAGNOSIS — E83.52 HYPERCALCEMIA: Primary | ICD-10-CM

## 2018-11-29 LAB
ANION GAP SERPL CALCULATED.3IONS-SCNC: 10.9 MMOL/L (ref 10–20)
BUN BLD-MCNC: 32 MG/DL (ref 7–20)
BUN/CREAT SERPL: 18.8 (ref 7.1–23.5)
CALCIUM SPEC-SCNC: 12.5 MG/DL (ref 8.4–10.2)
CHLORIDE SERPL-SCNC: 89 MMOL/L (ref 98–107)
CO2 SERPL-SCNC: 39 MMOL/L (ref 26–30)
CREAT BLD-MCNC: 1.7 MG/DL (ref 0.6–1.3)
GFR SERPL CREATININE-BSD FRML MDRD: 30 ML/MIN/1.73
GLUCOSE BLD-MCNC: 84 MG/DL (ref 74–98)
POTASSIUM BLD-SCNC: 4.9 MMOL/L (ref 3.5–5.1)
SODIUM BLD-SCNC: 134 MMOL/L (ref 137–145)

## 2018-11-29 PROCEDURE — 80048 BASIC METABOLIC PNL TOTAL CA: CPT

## 2018-11-29 PROCEDURE — 36415 COLL VENOUS BLD VENIPUNCTURE: CPT

## 2018-12-07 ENCOUNTER — TRANSCRIBE ORDERS (OUTPATIENT)
Dept: ADMINISTRATIVE | Facility: HOSPITAL | Age: 63
End: 2018-12-07

## 2018-12-07 DIAGNOSIS — K43.9 VENTRAL HERNIA WITHOUT OBSTRUCTION OR GANGRENE: Primary | ICD-10-CM

## 2018-12-13 ENCOUNTER — APPOINTMENT (OUTPATIENT)
Dept: CT IMAGING | Facility: HOSPITAL | Age: 63
End: 2018-12-13
Attending: SURGERY

## 2018-12-13 ENCOUNTER — HOSPITAL ENCOUNTER (OUTPATIENT)
Dept: CT IMAGING | Facility: HOSPITAL | Age: 63
Discharge: HOME | End: 2018-12-13
Attending: SURGERY | Admitting: SURGERY

## 2018-12-13 DIAGNOSIS — K43.9 VENTRAL HERNIA WITHOUT OBSTRUCTION OR GANGRENE: ICD-10-CM

## 2018-12-13 PROCEDURE — 74176 CT ABD & PELVIS W/O CONTRAST: CPT

## 2018-12-31 ENCOUNTER — TELEPHONE (OUTPATIENT)
Dept: INTERNAL MEDICINE | Facility: CLINIC | Age: 63
End: 2018-12-31

## 2018-12-31 NOTE — TELEPHONE ENCOUNTER
Pt called and she is needed to be seen before her apt Dr. Sheldon wants her to come in and get a appt with her.   I was wondering if she could be seen sooner.

## 2019-01-11 ENCOUNTER — HOSPITAL ENCOUNTER (INPATIENT)
Facility: HOSPITAL | Age: 64
LOS: 2 days | Discharge: HOME | End: 2019-01-15
Attending: EMERGENCY MEDICINE | Admitting: INTERNAL MEDICINE

## 2019-01-11 DIAGNOSIS — I10 ELEVATED BLOOD PRESSURE READING WITH DIAGNOSIS OF HYPERTENSION: ICD-10-CM

## 2019-01-11 DIAGNOSIS — N28.9 ACUTE ON CHRONIC RENAL INSUFFICIENCY: ICD-10-CM

## 2019-01-11 DIAGNOSIS — E83.52 HYPERCALCEMIA: ICD-10-CM

## 2019-01-11 DIAGNOSIS — Z74.09 IMPAIRED MOBILITY AND ADLS: ICD-10-CM

## 2019-01-11 DIAGNOSIS — N18.9 ACUTE ON CHRONIC RENAL INSUFFICIENCY: ICD-10-CM

## 2019-01-11 DIAGNOSIS — Z78.9 IMPAIRED MOBILITY AND ADLS: ICD-10-CM

## 2019-01-11 DIAGNOSIS — E83.39 HYPOPHOSPHATEMIA: Primary | ICD-10-CM

## 2019-01-11 DIAGNOSIS — Z74.09 IMPAIRED FUNCTIONAL MOBILITY, BALANCE, GAIT, AND ENDURANCE: ICD-10-CM

## 2019-01-11 PROBLEM — J44.9 COPD (CHRONIC OBSTRUCTIVE PULMONARY DISEASE) (HCC): Status: ACTIVE | Noted: 2019-01-11

## 2019-01-11 PROBLEM — N18.30 ACUTE RENAL FAILURE SUPERIMPOSED ON STAGE 3 CHRONIC KIDNEY DISEASE (HCC): Status: ACTIVE | Noted: 2019-01-11

## 2019-01-11 PROBLEM — Z99.81 SUPPLEMENTAL OXYGEN DEPENDENT: Status: ACTIVE | Noted: 2019-01-11

## 2019-01-11 PROBLEM — N17.9 ACUTE RENAL FAILURE SUPERIMPOSED ON STAGE 3 CHRONIC KIDNEY DISEASE (HCC): Status: ACTIVE | Noted: 2019-01-11

## 2019-01-11 LAB
ALBUMIN SERPL-MCNC: 4.49 G/DL (ref 3.2–4.8)
ALBUMIN/GLOB SERPL: 1.9 G/DL (ref 1.5–2.5)
ALP SERPL-CCNC: 79 U/L (ref 25–100)
ALT SERPL W P-5'-P-CCNC: 22 U/L (ref 7–40)
ANION GAP SERPL CALCULATED.3IONS-SCNC: 8 MMOL/L (ref 3–11)
AST SERPL-CCNC: 20 U/L (ref 0–33)
BACTERIA UR QL AUTO: NORMAL /HPF
BASOPHILS # BLD AUTO: 0.03 10*3/MM3 (ref 0–0.2)
BASOPHILS NFR BLD AUTO: 0.3 % (ref 0–1)
BILIRUB SERPL-MCNC: 0.5 MG/DL (ref 0.3–1.2)
BILIRUB UR QL STRIP: ABNORMAL
BUN BLD-MCNC: 57 MG/DL (ref 9–23)
BUN/CREAT SERPL: 18.4 (ref 7–25)
CA-I SERPL ISE-MCNC: 1.97 MMOL/L (ref 1.12–1.32)
CALCIUM SPEC-SCNC: 13.8 MG/DL (ref 8.7–10.4)
CHLORIDE SERPL-SCNC: 89 MMOL/L (ref 99–109)
CLARITY UR: CLEAR
CO2 SERPL-SCNC: 36 MMOL/L (ref 20–31)
COLOR UR: ABNORMAL
CREAT BLD-MCNC: 3.1 MG/DL (ref 0.6–1.3)
DEPRECATED RDW RBC AUTO: 56.5 FL (ref 37–54)
EOSINOPHIL # BLD AUTO: 0.12 10*3/MM3 (ref 0–0.3)
EOSINOPHIL NFR BLD AUTO: 1.1 % (ref 0–3)
ERYTHROCYTE [DISTWIDTH] IN BLOOD BY AUTOMATED COUNT: 15.2 % (ref 11.3–14.5)
GFR SERPL CREATININE-BSD FRML MDRD: 15 ML/MIN/1.73
GLOBULIN UR ELPH-MCNC: 2.3 GM/DL
GLUCOSE BLD-MCNC: 147 MG/DL (ref 70–100)
GLUCOSE UR STRIP-MCNC: NEGATIVE MG/DL
HCT VFR BLD AUTO: 28.6 % (ref 34.5–44)
HGB BLD-MCNC: 9.2 G/DL (ref 11.5–15.5)
HGB UR QL STRIP.AUTO: NEGATIVE
HOLD SPECIMEN: NORMAL
HOLD SPECIMEN: NORMAL
HYALINE CASTS UR QL AUTO: NORMAL /LPF
IMM GRANULOCYTES # BLD AUTO: 0.02 10*3/MM3 (ref 0–0.03)
IMM GRANULOCYTES NFR BLD AUTO: 0.2 % (ref 0–0.6)
KETONES UR QL STRIP: ABNORMAL
LEUKOCYTE ESTERASE UR QL STRIP.AUTO: ABNORMAL
LYMPHOCYTES # BLD AUTO: 1.65 10*3/MM3 (ref 0.6–4.8)
LYMPHOCYTES NFR BLD AUTO: 15.6 % (ref 24–44)
MAGNESIUM SERPL-MCNC: 2.1 MG/DL (ref 1.3–2.7)
MCH RBC QN AUTO: 32.9 PG (ref 27–31)
MCHC RBC AUTO-ENTMCNC: 32.2 G/DL (ref 32–36)
MCV RBC AUTO: 102.1 FL (ref 80–99)
MONOCYTES # BLD AUTO: 0.56 10*3/MM3 (ref 0–1)
MONOCYTES NFR BLD AUTO: 5.3 % (ref 0–12)
NEUTROPHILS # BLD AUTO: 8.22 10*3/MM3 (ref 1.5–8.3)
NEUTROPHILS NFR BLD AUTO: 77.5 % (ref 41–71)
NITRITE UR QL STRIP: NEGATIVE
PH UR STRIP.AUTO: <=5 [PH] (ref 5–8)
PHOSPHATE SERPL-MCNC: 0.8 MG/DL (ref 2.4–5.1)
PLATELET # BLD AUTO: 221 10*3/MM3 (ref 150–450)
PMV BLD AUTO: 8.3 FL (ref 6–12)
POTASSIUM BLD-SCNC: 4.3 MMOL/L (ref 3.5–5.5)
PROT SERPL-MCNC: 6.8 G/DL (ref 5.7–8.2)
PROT UR QL STRIP: NEGATIVE
RBC # BLD AUTO: 2.8 10*6/MM3 (ref 3.89–5.14)
RBC # UR: NORMAL /HPF
REF LAB TEST METHOD: NORMAL
SODIUM BLD-SCNC: 133 MMOL/L (ref 132–146)
SP GR UR STRIP: 1.01 (ref 1–1.03)
SQUAMOUS #/AREA URNS HPF: NORMAL /HPF
UROBILINOGEN UR QL STRIP: ABNORMAL
WBC NRBC COR # BLD: 10.6 10*3/MM3 (ref 3.5–10.8)
WBC UR QL AUTO: NORMAL /HPF
WHOLE BLOOD HOLD SPECIMEN: NORMAL
WHOLE BLOOD HOLD SPECIMEN: NORMAL

## 2019-01-11 PROCEDURE — 83735 ASSAY OF MAGNESIUM: CPT | Performed by: EMERGENCY MEDICINE

## 2019-01-11 PROCEDURE — G0378 HOSPITAL OBSERVATION PER HR: HCPCS

## 2019-01-11 PROCEDURE — 36415 COLL VENOUS BLD VENIPUNCTURE: CPT

## 2019-01-11 PROCEDURE — 99220 PR INITIAL OBSERVATION CARE/DAY 70 MINUTES: CPT | Performed by: HOSPITALIST

## 2019-01-11 PROCEDURE — 84100 ASSAY OF PHOSPHORUS: CPT | Performed by: EMERGENCY MEDICINE

## 2019-01-11 PROCEDURE — 99283 EMERGENCY DEPT VISIT LOW MDM: CPT

## 2019-01-11 PROCEDURE — 82330 ASSAY OF CALCIUM: CPT | Performed by: EMERGENCY MEDICINE

## 2019-01-11 PROCEDURE — 85025 COMPLETE CBC W/AUTO DIFF WBC: CPT

## 2019-01-11 PROCEDURE — 81001 URINALYSIS AUTO W/SCOPE: CPT | Performed by: EMERGENCY MEDICINE

## 2019-01-11 PROCEDURE — 80053 COMPREHEN METABOLIC PANEL: CPT | Performed by: EMERGENCY MEDICINE

## 2019-01-11 RX ADMIN — SODIUM CHLORIDE 1000 ML: 9 INJECTION, SOLUTION INTRAVENOUS at 21:58

## 2019-01-11 RX ADMIN — SODIUM GLYCOLATE 20 MMOL: 216 INJECTION, SOLUTION INTRAVENOUS at 22:29

## 2019-01-12 ENCOUNTER — APPOINTMENT (OUTPATIENT)
Dept: GENERAL RADIOLOGY | Facility: HOSPITAL | Age: 64
End: 2019-01-12

## 2019-01-12 LAB
ANION GAP SERPL CALCULATED.3IONS-SCNC: 4 MMOL/L (ref 3–11)
BASOPHILS # BLD AUTO: 0.02 10*3/MM3 (ref 0–0.2)
BASOPHILS NFR BLD AUTO: 0.3 % (ref 0–1)
BUN BLD-MCNC: 57 MG/DL (ref 9–23)
BUN/CREAT SERPL: 19.6 (ref 7–25)
CA-I SERPL ISE-MCNC: 1.92 MMOL/L (ref 1.12–1.32)
CALCIUM 24H UR-MCNC: 14.7 MG/DL
CALCIUM SPEC-SCNC: 12.4 MG/DL (ref 8.7–10.4)
CHLORIDE SERPL-SCNC: 94 MMOL/L (ref 99–109)
CO2 SERPL-SCNC: 36 MMOL/L (ref 20–31)
CREAT BLD-MCNC: 2.91 MG/DL (ref 0.6–1.3)
CREAT UR-MCNC: 52 MG/DL
DEPRECATED RDW RBC AUTO: 59.2 FL (ref 37–54)
EOSINOPHIL # BLD AUTO: 0.12 10*3/MM3 (ref 0–0.3)
EOSINOPHIL NFR BLD AUTO: 1.8 % (ref 0–3)
ERYTHROCYTE [DISTWIDTH] IN BLOOD BY AUTOMATED COUNT: 15.6 % (ref 11.3–14.5)
FERRITIN SERPL-MCNC: 36 NG/ML (ref 10–291)
FOLATE SERPL-MCNC: 4.33 NG/ML (ref 3.2–20)
GFR SERPL CREATININE-BSD FRML MDRD: 16 ML/MIN/1.73
GLUCOSE BLD-MCNC: 89 MG/DL (ref 70–100)
GLUCOSE BLDC GLUCOMTR-MCNC: 141 MG/DL (ref 70–130)
GLUCOSE BLDC GLUCOMTR-MCNC: 146 MG/DL (ref 70–130)
HBA1C MFR BLD: 4.7 % (ref 4.8–5.6)
HCT VFR BLD AUTO: 25.8 % (ref 34.5–44)
HGB BLD-MCNC: 8.3 G/DL (ref 11.5–15.5)
IMM GRANULOCYTES # BLD AUTO: 0.02 10*3/MM3 (ref 0–0.03)
IMM GRANULOCYTES NFR BLD AUTO: 0.3 % (ref 0–0.6)
IRON 24H UR-MRATE: 32 MCG/DL (ref 50–175)
IRON SATN MFR SERPL: 10 % (ref 15–50)
LYMPHOCYTES # BLD AUTO: 1.36 10*3/MM3 (ref 0.6–4.8)
LYMPHOCYTES NFR BLD AUTO: 20.2 % (ref 24–44)
MCH RBC QN AUTO: 33.3 PG (ref 27–31)
MCHC RBC AUTO-ENTMCNC: 32.2 G/DL (ref 32–36)
MCV RBC AUTO: 103.6 FL (ref 80–99)
MONOCYTES # BLD AUTO: 0.5 10*3/MM3 (ref 0–1)
MONOCYTES NFR BLD AUTO: 7.4 % (ref 0–12)
NEUTROPHILS # BLD AUTO: 4.74 10*3/MM3 (ref 1.5–8.3)
NEUTROPHILS NFR BLD AUTO: 70.3 % (ref 41–71)
PHOSPHATE SERPL-MCNC: 1.8 MG/DL (ref 2.4–5.1)
PHOSPHATE UR-MCNC: <5 MG/DL
PLATELET # BLD AUTO: 184 10*3/MM3 (ref 150–450)
PMV BLD AUTO: 8.5 FL (ref 6–12)
POTASSIUM BLD-SCNC: 3.5 MMOL/L (ref 3.5–5.5)
PROT UR-MCNC: 7 MG/DL (ref 1–14)
RBC # BLD AUTO: 2.49 10*6/MM3 (ref 3.89–5.14)
RETICS/RBC NFR AUTO: 2.63 % (ref 0.5–1.5)
SODIUM BLD-SCNC: 134 MMOL/L (ref 132–146)
SODIUM UR-SCNC: 41 MMOL/L (ref 30–90)
TIBC SERPL-MCNC: 336 MCG/DL (ref 250–450)
TSH SERPL DL<=0.05 MIU/L-ACNC: 1.42 MIU/ML (ref 0.35–5.35)
VIT B12 BLD-MCNC: 844 PG/ML (ref 211–911)
WBC NRBC COR # BLD: 6.74 10*3/MM3 (ref 3.5–10.8)

## 2019-01-12 PROCEDURE — G0378 HOSPITAL OBSERVATION PER HR: HCPCS

## 2019-01-12 PROCEDURE — 85014 HEMATOCRIT: CPT | Performed by: NURSE PRACTITIONER

## 2019-01-12 PROCEDURE — 63710000001 FAMOTIDINE 20 MG TABLET: Performed by: NURSE PRACTITIONER

## 2019-01-12 PROCEDURE — 83550 IRON BINDING TEST: CPT | Performed by: NURSE PRACTITIONER

## 2019-01-12 PROCEDURE — A9270 NON-COVERED ITEM OR SERVICE: HCPCS | Performed by: NURSE PRACTITIONER

## 2019-01-12 PROCEDURE — 82570 ASSAY OF URINE CREATININE: CPT | Performed by: NURSE PRACTITIONER

## 2019-01-12 PROCEDURE — 63710000001 AMIODARONE 200 MG TABLET: Performed by: INTERNAL MEDICINE

## 2019-01-12 PROCEDURE — 84100 ASSAY OF PHOSPHORUS: CPT | Performed by: NURSE PRACTITIONER

## 2019-01-12 PROCEDURE — 82962 GLUCOSE BLOOD TEST: CPT

## 2019-01-12 PROCEDURE — 94640 AIRWAY INHALATION TREATMENT: CPT

## 2019-01-12 PROCEDURE — 84156 ASSAY OF PROTEIN URINE: CPT | Performed by: NURSE PRACTITIONER

## 2019-01-12 PROCEDURE — 63710000001 METOPROLOL SUCCINATE XL 50 MG TABLET SUSTAINED-RELEASE 24 HOUR: Performed by: INTERNAL MEDICINE

## 2019-01-12 PROCEDURE — 85025 COMPLETE CBC W/AUTO DIFF WBC: CPT | Performed by: NURSE PRACTITIONER

## 2019-01-12 PROCEDURE — 63710000001 FLUTICASONE 50 MCG/ACT SUSPENSION 16 G BOTTLE: Performed by: NURSE PRACTITIONER

## 2019-01-12 PROCEDURE — 63710000001 INSULIN LISPRO (HUMAN) PER 5 UNITS: Performed by: NURSE PRACTITIONER

## 2019-01-12 PROCEDURE — 63710000001 ATORVASTATIN 40 MG TABLET: Performed by: INTERNAL MEDICINE

## 2019-01-12 PROCEDURE — 83540 ASSAY OF IRON: CPT | Performed by: NURSE PRACTITIONER

## 2019-01-12 PROCEDURE — 99226 PR SBSQ OBSERVATION CARE/DAY 35 MINUTES: CPT | Performed by: INTERNAL MEDICINE

## 2019-01-12 PROCEDURE — 82330 ASSAY OF CALCIUM: CPT | Performed by: NURSE PRACTITIONER

## 2019-01-12 PROCEDURE — 80048 BASIC METABOLIC PNL TOTAL CA: CPT | Performed by: NURSE PRACTITIONER

## 2019-01-12 PROCEDURE — 85045 AUTOMATED RETICULOCYTE COUNT: CPT | Performed by: NURSE PRACTITIONER

## 2019-01-12 PROCEDURE — A9270 NON-COVERED ITEM OR SERVICE: HCPCS | Performed by: INTERNAL MEDICINE

## 2019-01-12 PROCEDURE — 86335 IMMUNFIX E-PHORSIS/URINE/CSF: CPT | Performed by: NURSE PRACTITIONER

## 2019-01-12 PROCEDURE — 83036 HEMOGLOBIN GLYCOSYLATED A1C: CPT | Performed by: NURSE PRACTITIONER

## 2019-01-12 PROCEDURE — 82310 ASSAY OF CALCIUM: CPT | Performed by: NURSE PRACTITIONER

## 2019-01-12 PROCEDURE — 94799 UNLISTED PULMONARY SVC/PX: CPT

## 2019-01-12 PROCEDURE — 82607 VITAMIN B-12: CPT | Performed by: NURSE PRACTITIONER

## 2019-01-12 PROCEDURE — 82728 ASSAY OF FERRITIN: CPT | Performed by: NURSE PRACTITIONER

## 2019-01-12 PROCEDURE — 84105 ASSAY OF URINE PHOSPHORUS: CPT | Performed by: NURSE PRACTITIONER

## 2019-01-12 PROCEDURE — 71046 X-RAY EXAM CHEST 2 VIEWS: CPT

## 2019-01-12 PROCEDURE — 84300 ASSAY OF URINE SODIUM: CPT | Performed by: NURSE PRACTITIONER

## 2019-01-12 PROCEDURE — 63710000001 LOPERAMIDE 2 MG CAPSULE: Performed by: INTERNAL MEDICINE

## 2019-01-12 PROCEDURE — 82746 ASSAY OF FOLIC ACID SERUM: CPT | Performed by: NURSE PRACTITIONER

## 2019-01-12 PROCEDURE — 63710000001 LEVOTHYROXINE 25 MCG TABLET: Performed by: NURSE PRACTITIONER

## 2019-01-12 PROCEDURE — 82747 ASSAY OF FOLIC ACID RBC: CPT | Performed by: NURSE PRACTITIONER

## 2019-01-12 PROCEDURE — 25810000003 SODIUM CHLORIDE 0.9 % WITH KCL 20 MEQ 20-0.9 MEQ/L-% SOLUTION: Performed by: INTERNAL MEDICINE

## 2019-01-12 PROCEDURE — 84443 ASSAY THYROID STIM HORMONE: CPT | Performed by: NURSE PRACTITIONER

## 2019-01-12 PROCEDURE — 63710000001 APIXABAN 5 MG TABLET: Performed by: INTERNAL MEDICINE

## 2019-01-12 RX ORDER — ATORVASTATIN CALCIUM 40 MG/1
40 TABLET, FILM COATED ORAL NIGHTLY
Status: DISCONTINUED | OUTPATIENT
Start: 2019-01-12 | End: 2019-01-15 | Stop reason: HOSPADM

## 2019-01-12 RX ORDER — NICOTINE POLACRILEX 4 MG
15 LOZENGE BUCCAL
Status: DISCONTINUED | OUTPATIENT
Start: 2019-01-12 | End: 2019-01-15 | Stop reason: HOSPADM

## 2019-01-12 RX ORDER — METOPROLOL SUCCINATE 50 MG/1
50 TABLET, EXTENDED RELEASE ORAL
Status: DISCONTINUED | OUTPATIENT
Start: 2019-01-12 | End: 2019-01-15 | Stop reason: HOSPADM

## 2019-01-12 RX ORDER — SODIUM CHLORIDE 9 MG/ML
75 INJECTION, SOLUTION INTRAVENOUS CONTINUOUS
Status: DISCONTINUED | OUTPATIENT
Start: 2019-01-12 | End: 2019-01-12

## 2019-01-12 RX ORDER — ONDANSETRON 4 MG/1
4 TABLET, FILM COATED ORAL EVERY 6 HOURS PRN
Status: DISCONTINUED | OUTPATIENT
Start: 2019-01-12 | End: 2019-01-15 | Stop reason: HOSPADM

## 2019-01-12 RX ORDER — ONDANSETRON 2 MG/ML
4 INJECTION INTRAMUSCULAR; INTRAVENOUS EVERY 6 HOURS PRN
Status: DISCONTINUED | OUTPATIENT
Start: 2019-01-12 | End: 2019-01-15 | Stop reason: HOSPADM

## 2019-01-12 RX ORDER — LOPERAMIDE HYDROCHLORIDE 2 MG/1
4 CAPSULE ORAL 4 TIMES DAILY PRN
Status: DISCONTINUED | OUTPATIENT
Start: 2019-01-12 | End: 2019-01-15 | Stop reason: HOSPADM

## 2019-01-12 RX ORDER — FAMOTIDINE 20 MG/1
10 TABLET, FILM COATED ORAL DAILY
Status: DISCONTINUED | OUTPATIENT
Start: 2019-01-12 | End: 2019-01-15 | Stop reason: HOSPADM

## 2019-01-12 RX ORDER — DEXTROSE MONOHYDRATE 25 G/50ML
25 INJECTION, SOLUTION INTRAVENOUS
Status: DISCONTINUED | OUTPATIENT
Start: 2019-01-12 | End: 2019-01-15 | Stop reason: HOSPADM

## 2019-01-12 RX ORDER — IPRATROPIUM BROMIDE AND ALBUTEROL SULFATE 2.5; .5 MG/3ML; MG/3ML
3 SOLUTION RESPIRATORY (INHALATION)
Status: DISCONTINUED | OUTPATIENT
Start: 2019-01-12 | End: 2019-01-15 | Stop reason: HOSPADM

## 2019-01-12 RX ORDER — SODIUM CHLORIDE AND POTASSIUM CHLORIDE 150; 900 MG/100ML; MG/100ML
125 INJECTION, SOLUTION INTRAVENOUS CONTINUOUS
Status: ACTIVE | OUTPATIENT
Start: 2019-01-12 | End: 2019-01-14

## 2019-01-12 RX ORDER — LEVOTHYROXINE SODIUM 0.07 MG/1
75 TABLET ORAL DAILY
Status: DISCONTINUED | OUTPATIENT
Start: 2019-01-13 | End: 2019-01-15 | Stop reason: HOSPADM

## 2019-01-12 RX ORDER — SODIUM CHLORIDE 0.9 % (FLUSH) 0.9 %
3 SYRINGE (ML) INJECTION EVERY 12 HOURS SCHEDULED
Status: DISCONTINUED | OUTPATIENT
Start: 2019-01-12 | End: 2019-01-15 | Stop reason: HOSPADM

## 2019-01-12 RX ORDER — FLUTICASONE PROPIONATE 50 MCG
2 SPRAY, SUSPENSION (ML) NASAL DAILY
Status: DISCONTINUED | OUTPATIENT
Start: 2019-01-12 | End: 2019-01-15 | Stop reason: HOSPADM

## 2019-01-12 RX ORDER — AMIODARONE HYDROCHLORIDE 200 MG/1
200 TABLET ORAL
Status: DISCONTINUED | OUTPATIENT
Start: 2019-01-12 | End: 2019-01-15 | Stop reason: HOSPADM

## 2019-01-12 RX ORDER — LEVOTHYROXINE SODIUM 0.03 MG/1
25 TABLET ORAL DAILY
Status: DISCONTINUED | OUTPATIENT
Start: 2019-01-12 | End: 2019-01-12

## 2019-01-12 RX ORDER — SODIUM CHLORIDE 0.9 % (FLUSH) 0.9 %
3-10 SYRINGE (ML) INJECTION AS NEEDED
Status: DISCONTINUED | OUTPATIENT
Start: 2019-01-12 | End: 2019-01-15 | Stop reason: HOSPADM

## 2019-01-12 RX ADMIN — FLUTICASONE PROPIONATE 2 SPRAY: 50 SPRAY, METERED NASAL at 08:29

## 2019-01-12 RX ADMIN — FAMOTIDINE 10 MG: 20 TABLET ORAL at 08:29

## 2019-01-12 RX ADMIN — AMIODARONE HYDROCHLORIDE 200 MG: 200 TABLET ORAL at 15:52

## 2019-01-12 RX ADMIN — POTASSIUM CHLORIDE AND SODIUM CHLORIDE 125 ML/HR: 900; 150 INJECTION, SOLUTION INTRAVENOUS at 15:52

## 2019-01-12 RX ADMIN — SODIUM CHLORIDE, PRESERVATIVE FREE 3 ML: 5 INJECTION INTRAVENOUS at 21:05

## 2019-01-12 RX ADMIN — APIXABAN 5 MG: 5 TABLET, FILM COATED ORAL at 21:05

## 2019-01-12 RX ADMIN — LOPERAMIDE HYDROCHLORIDE 4 MG: 2 CAPSULE ORAL at 15:52

## 2019-01-12 RX ADMIN — ATORVASTATIN CALCIUM 40 MG: 40 TABLET, FILM COATED ORAL at 21:05

## 2019-01-12 RX ADMIN — IPRATROPIUM BROMIDE AND ALBUTEROL SULFATE 3 ML: 2.5; .5 SOLUTION RESPIRATORY (INHALATION) at 08:27

## 2019-01-12 RX ADMIN — IPRATROPIUM BROMIDE AND ALBUTEROL SULFATE 3 ML: 2.5; .5 SOLUTION RESPIRATORY (INHALATION) at 16:03

## 2019-01-12 RX ADMIN — SODIUM PHOSPHATE, MONOBASIC, MONOHYDRATE 15 MMOL: 276; 142 INJECTION, SOLUTION INTRAVENOUS at 15:51

## 2019-01-12 RX ADMIN — LEVOTHYROXINE SODIUM 25 MCG: 25 TABLET ORAL at 03:57

## 2019-01-12 RX ADMIN — METOPROLOL SUCCINATE 50 MG: 50 TABLET, EXTENDED RELEASE ORAL at 15:52

## 2019-01-12 RX ADMIN — SODIUM CHLORIDE, PRESERVATIVE FREE 3 ML: 5 INJECTION INTRAVENOUS at 08:29

## 2019-01-12 NOTE — PLAN OF CARE
Problem: Patient Care Overview  Goal: Plan of Care Review  Outcome: Ongoing (interventions implemented as appropriate)   01/12/19 0423   Coping/Psychosocial   Plan of Care Reviewed With patient   Plan of Care Review   Progress improving   OTHER   Outcome Summary VSS. Patient resting in bed. Will continue to monitor.        Problem: Fall Risk (Adult)  Goal: Identify Related Risk Factors and Signs and Symptoms  Outcome: Ongoing (interventions implemented as appropriate)   01/12/19 0423   Fall Risk (Adult)   Related Risk Factors (Fall Risk) age-related changes;fear of falling;environment unfamiliar   Signs and Symptoms (Fall Risk) presence of risk factors       Problem: Hospitalized Acutely Ill Older (Adult)  Goal: Signs and Symptoms of Listed Potential Problems Will be Absent, Minimized or Managed (Hospitalized Acutely Ill Older)  Outcome: Ongoing (interventions implemented as appropriate)   01/12/19 0423   Goal/Outcome Evaluation   Problems Assessed (Acutely Ill Older Adult) functional decline/self-care deficit   Problems Present (Acute Older Adult) functional decline/self-care deficit

## 2019-01-12 NOTE — ED PROVIDER NOTES
Subjective   Ms. Anny Sousa is a 63 y.o. female who presents to the ED with c/o an abnormal lab. She reports that around 4 months ago she was taking Sotalol for a-fib but it was elevating her calcium and making her feel extremely poorly so 3 months ago she was switched from Sotalol to Amiodarone, which then caused her to have elevated Thyroid labs. She was then started on 25 micrograms of Levothyroxine but a month ago they raised her dose to 75 micrograms and changed her iron from daily to BID. Around 2-3 weeks ago her calcium as down to 11 so she had a cardioversion by Dr. Schoen, cardiology. 5 days ago, she developed worsened chronic fatigue, weakness, dizziness, and blurred vision so she went to see her PCP yesterday, who de labs and called her today to come into the ED 2/2 a low phosphorus. She denies a headache and abdominal pain. She has a hx of COPD, HTN, and astigmatism. Her endocrinologist is Dr. Vela. No other acute complaints at this time.         History provided by:  Patient  Illness   Quality:  Low Phosphorus  Severity:  Moderate  Timing:  Constant  Chronicity:  New  Associated symptoms: abdominal pain    Associated symptoms: no headaches        Review of Systems   Eyes: Positive for visual disturbance (Blurry vision).   Gastrointestinal: Positive for abdominal pain.   Neurological: Positive for dizziness and weakness. Negative for headaches.   All other systems reviewed and are negative.      Past Medical History:   Diagnosis Date   • Abnormal finding on lung imaging    • Adrenal cortical adenoma     A.  3.5 cm water density left adrenal mass consistent with adenoma noted on CT scan of   06/11/2013, unchanged in size from previous scans. Labs 5/2015 - normal cortisol and cats/mets   • Allergic rhinitis    • Anemia    • Arthritis    • Atherosclerosis    • Backache    • Cholelithiasis     2 cm densely calcified gallstone incidentally noted on CT scan of the chest 06/11/2013, asymptomatic.   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    • H/O transfusion of whole blood    • Hypertension    • Impaired fasting glucose    • Ingrown nail    • Iron deficiency    • Lower extremity edema    • Muscle spasm    • Myopia    • Nephrolithiasis    • Osteoarthritis    • Palpitations    • Paroxysmal supraventricular tachycardia (CMS/HCC)    • Sinus problem    • Skin lesion of left lower extremity    • Vitamin B12 deficiency        Allergies   Allergen Reactions   • Fish-Derived Products    • Iodinated Diagnostic Agents Itching   • Minocin [Minocycline Hcl]    • Albuterol Palpitations   • Flexeril [Cyclobenzaprine] Palpitations   • Reglan [Metoclopramide] Palpitations       Past Surgical History:   Procedure Laterality Date   • ADENOIDECTOMY     • ANGIOPLASTY     • APPENDECTOMY     • CARDIAC PACEMAKER PLACEMENT     • DILATATION AND CURETTAGE     • EAR TUBES      Ear Pressure Equalization   • LASIK     • OTHER SURGICAL HISTORY      Catheter Ablation Atrial supraventricular Tachycardia   • TONSILLECTOMY         Family History   Problem Relation Age of Onset   • Liver disease Mother    • Inflammatory bowel disease Mother    • Liver disease Father    • Liver disease Brother    • Arthritis Other    • Cancer Other    • Heart disease Other    • Hypertension Other    • Obesity Other    • Migraines Other    • Stroke Other    • Tuberculosis Other    • Breast cancer Neg Hx    • Ovarian cancer Neg Hx        Social History     Socioeconomic History   • Marital status: Single     Spouse name: Not on file   • Number of children: Not on file   • Years of education: Not on file   • Highest education level: Not on file   Tobacco Use   • Smoking status: Former Smoker     Types: Cigarettes     Last attempt to quit: 2014     Years since quittin.0   •  Smokeless tobacco: Never Used   • Tobacco comment: quit in 2014   Substance and Sexual Activity   • Alcohol use: No   • Drug use: No         Objective   Physical Exam   Constitutional: She is oriented to person, place, and time. She appears well-developed and well-nourished. No distress.   HENT:   Head: Normocephalic and atraumatic.   Nose: Nose normal.   Eyes: Conjunctivae are normal. No scleral icterus.   Neck: Normal range of motion. Neck supple.   Cardiovascular: Normal rate, regular rhythm, normal heart sounds and intact distal pulses.   No murmur heard.  Pulmonary/Chest: Effort normal. No respiratory distress. She has wheezes.   Patient has wheezing throughout her bilateral lungs.    Musculoskeletal: Normal range of motion.   Neurological: She is alert and oriented to person, place, and time.   Skin: Skin is warm and dry. She is not diaphoretic.   Psychiatric: She has a normal mood and affect. Her behavior is normal.   Nursing note and vitals reviewed.      Procedures         ED Course  ED Course as of Jan 12 0114 Fri Jan 11, 2019 2038 Creatinine: (!) 3.10 [RS]   2039 Phosphorus: (!!) 0.8 [RS]   2042 Ionized Calcium: (!!) 1.97 [RS]   2045 Potassium: 4.3 [RS]   2208 Magnesium: 2.1 [RS]   2209 Hospitalist paged for admission.  [RS]   2222 Discussed with Dr. Alcantara who agrees with the plan to admit.  [RS]      ED Course User Index  [RS] Roe Jones MD       Recent Results (from the past 24 hour(s))   CBC Auto Differential    Collection Time: 01/11/19  7:26 PM   Result Value Ref Range    WBC 10.60 3.50 - 10.80 10*3/mm3    RBC 2.80 (L) 3.89 - 5.14 10*6/mm3    Hemoglobin 9.2 (L) 11.5 - 15.5 g/dL    Hematocrit 28.6 (L) 34.5 - 44.0 %    .1 (H) 80.0 - 99.0 fL    MCH 32.9 (H) 27.0 - 31.0 pg    MCHC 32.2 32.0 - 36.0 g/dL    RDW 15.2 (H) 11.3 - 14.5 %    RDW-SD 56.5 (H) 37.0 - 54.0 fl    MPV 8.3 6.0 - 12.0 fL    Platelets 221 150 - 450 10*3/mm3    Neutrophil % 77.5 (H) 41.0 - 71.0 %    Lymphocyte %  15.6 (L) 24.0 - 44.0 %    Monocyte % 5.3 0.0 - 12.0 %    Eosinophil % 1.1 0.0 - 3.0 %    Basophil % 0.3 0.0 - 1.0 %    Immature Grans % 0.2 0.0 - 0.6 %    Neutrophils, Absolute 8.22 1.50 - 8.30 10*3/mm3    Lymphocytes, Absolute 1.65 0.60 - 4.80 10*3/mm3    Monocytes, Absolute 0.56 0.00 - 1.00 10*3/mm3    Eosinophils, Absolute 0.12 0.00 - 0.30 10*3/mm3    Basophils, Absolute 0.03 0.00 - 0.20 10*3/mm3    Immature Grans, Absolute 0.02 0.00 - 0.03 10*3/mm3   Comprehensive Metabolic Panel    Collection Time: 01/11/19  7:26 PM   Result Value Ref Range    Glucose 147 (H) 70 - 100 mg/dL    BUN 57 (H) 9 - 23 mg/dL    Creatinine 3.10 (H) 0.60 - 1.30 mg/dL    Sodium 133 132 - 146 mmol/L    Potassium 4.3 3.5 - 5.5 mmol/L    Chloride 89 (L) 99 - 109 mmol/L    CO2 36.0 (H) 20.0 - 31.0 mmol/L    Calcium 13.8 (H) 8.7 - 10.4 mg/dL    Total Protein 6.8 5.7 - 8.2 g/dL    Albumin 4.49 3.20 - 4.80 g/dL    ALT (SGPT) 22 7 - 40 U/L    AST (SGOT) 20 0 - 33 U/L    Alkaline Phosphatase 79 25 - 100 U/L    Total Bilirubin 0.5 0.3 - 1.2 mg/dL    eGFR Non African Amer 15 (L) >60 mL/min/1.73    Globulin 2.3 gm/dL    A/G Ratio 1.9 1.5 - 2.5 g/dL    BUN/Creatinine Ratio 18.4 7.0 - 25.0    Anion Gap 8.0 3.0 - 11.0 mmol/L   Phosphorus    Collection Time: 01/11/19  7:26 PM   Result Value Ref Range    Phosphorus 0.8 (C) 2.4 - 5.1 mg/dL   Light Blue Top    Collection Time: 01/11/19  7:26 PM   Result Value Ref Range    Extra Tube hold for add-on    Green Top (Gel)    Collection Time: 01/11/19  7:26 PM   Result Value Ref Range    Extra Tube Hold for add-ons.    Lavender Top    Collection Time: 01/11/19  7:26 PM   Result Value Ref Range    Extra Tube hold for add-on    Gold Top - SST    Collection Time: 01/11/19  7:26 PM   Result Value Ref Range    Extra Tube Hold for add-ons.    Magnesium    Collection Time: 01/11/19  7:26 PM   Result Value Ref Range    Magnesium 2.1 1.3 - 2.7 mg/dL   Calcium, Ionized    Collection Time: 01/11/19  7:26 PM   Result Value  "Ref Range    Ionized Calcium 1.97 (C) 1.12 - 1.32 mmol/L   Urinalysis With Microscopic If Indicated (No Culture) - Urine, Catheter    Collection Time: 01/11/19  8:53 PM   Result Value Ref Range    Color, UA Dark Yellow (A) Yellow, Straw    Appearance, UA Clear Clear    pH, UA <=5.0 5.0 - 8.0    Specific Gravity, UA 1.015 1.001 - 1.030    Glucose, UA Negative Negative    Ketones, UA 15 mg/dL (1+) (A) Negative    Bilirubin, UA Small (1+) (A) Negative    Blood, UA Negative Negative    Protein, UA Negative Negative    Leuk Esterase, UA Trace (A) Negative    Nitrite, UA Negative Negative    Urobilinogen, UA 1.0 E.U./dL 0.2 - 1.0 E.U./dL   Urinalysis, Microscopic Only - Urine, Catheter    Collection Time: 01/11/19  8:53 PM   Result Value Ref Range    RBC, UA 0-2 None Seen, 0-2 /HPF    WBC, UA 0-2 None Seen, 0-2 /HPF    Bacteria, UA None Seen None Seen, Trace /HPF    Squamous Epithelial Cells, UA 0-2 None Seen, 0-2 /HPF    Hyaline Casts, UA 7-12 0 - 6 /LPF    Methodology Automated Microscopy      Note: In addition to lab results from this visit, the labs listed above may include labs taken at another facility or during a different encounter within the last 24 hours. Please correlate lab times with ED admission and discharge times for further clarification of the services performed during this visit.    No orders to display     Vitals:    01/11/19 2041 01/11/19 2100 01/11/19 2300 01/11/19 2332   BP:  120/47 133/68 132/65   BP Location:    Right arm   Patient Position:    Sitting   Pulse: 63   71   Resp:    18   Temp:    98.5 °F (36.9 °C)   TempSrc:    Oral   SpO2: 98% 98% 99% 96%   Weight:    102 kg (224 lb 3.2 oz)   Height:    165.1 cm (65\")     Medications   sodium phosphates 21 mmol in Sodium chloride 0.9 % 250 mL IVPB (not administered)     Or   sodium phosphates 15 mmol in Sodium chloride 0.9 % 250 mL IVPB (not administered)   sodium glycerophosphate 20 mmol in Sodium chloride 0.9 % 250 mL IVPB (20 mmol Intravenous " Given 1/11/19 2229)   sodium chloride 0.9 % bolus 1,000 mL (1,000 mL Intravenous New Bag 1/11/19 2158)     ECG/EMG Results (last 24 hours)     ** No results found for the last 24 hours. **                      MDM  Number of Diagnoses or Management Options  Acute on chronic renal insufficiency:   Elevated blood pressure reading with diagnosis of hypertension:   Hypercalcemia:   Hypophosphatemia:      Amount and/or Complexity of Data Reviewed  Clinical lab tests: reviewed  Decide to obtain previous medical records or to obtain history from someone other than the patient: yes  Obtain history from someone other than the patient: yes  Discuss the patient with other providers: yes        Final diagnoses:   Hypophosphatemia   Hypercalcemia   Acute on chronic renal insufficiency   Elevated blood pressure reading with diagnosis of hypertension       Documentation assistance provided by gagandeep Lassiter.  Information recorded by the gagandeep was done at my direction and has been verified and validated by me.     Surekha Lassiter  01/11/19 2047       Surekha Lassiter  01/11/19 2222       Roe Jones MD  01/12/19 0114

## 2019-01-12 NOTE — PROGRESS NOTES
[facility] Medicine Services  PROGRESS NOTE    Patient Name: Anny Santos  : 1955  MRN: [id number]    Date of Admission: 2019  Length of Stay: 0  Primary Care Physician: Mahendra Moreno MD    Subjective   Subjective     CC:  Electrolyte abnls    HPI:  Patient admitted overnight. States she feels poorly this morning. Complaining about not getting her medications on time as well as unhealthy food they brought her for breakfast. States she has had issues with elevated calcium in the past. No family present.    Review of Systems      Otherwise ROS is negative except as mentioned in the HPI.    Objective   Objective     Vital Signs:   Temp:  [97.9 °F (36.6 °C)-98.5 °F (36.9 °C)] 97.9 °F (36.6 °C)  Heart Rate:  [60-71] 69  Resp:  [18-20] 20  BP: ()/(39-71) 112/49        Physical Exam:  GEN- no acute distress noted, sitting up in bed  HEENT- atraumatic, normocephlic, eomi, on NC  NECK- supple, trachea midline, no masses  RESP: ctab, normal effort, on NC  CV: no murmurs, s1/s2, rrr  GI: obese, nd, nt  MSK: no edema noted, spontaneous movement of all extremities  NEURO: alert, oriented, no focal deficits  SKIN: no rashes  PSYCH: appropriate mood and affect       Results Reviewed:  I have personally reviewed current lab, radiology, and data and agree.    Results from last 7 days   Lab Units  19   0541  19   WBC 10*3/mm3  6.74  10.60   HEMOGLOBIN g/dL  8.3*  9.2*   HEMATOCRIT %  25.8*  28.6*   PLATELETS 10*3/mm3  184  221     Results from last 7 days   Lab Units  19   0541  19   192   SODIUM mmol/L  134  133   POTASSIUM mmol/L  3.5  4.3   CHLORIDE mmol/L  94*  89*   CO2 mmol/L  36.0*  36.0*   BUN mg/dL  57*  57*   CREATININE mg/dL  2.91*  3.10*   GLUCOSE mg/dL  89  147*   CALCIUM mg/dL  12.4*  13.8*   ALT (SGPT) U/L   --   22   AST (SGOT) U/L   --   20     Estimated Creatinine Clearance: 22.8 mL/min (A) (by C-G formula based on SCr of 2.91 mg/dL  (H)).    No results found for: BNP    Microbiology Results Abnormal     None          Imaging Results (last 24 hours)     Procedure Component Value Units Date/Time    XR Chest PA & Lateral [[id number]] Collected:  01/12/19 0300     Updated:  01/12/19 0435    Narrative:       EXAM:    XR Chest, 2 Views     EXAM DATE/TIME:    1/12/2019 3:00 AM     CLINICAL HISTORY:    63 years old, female; Other disorders of phosphorus metabolism; Hypercalcemia;   Essential (primary) hypertension; Chronic kidney disease, unspecified; Disorder   of kidney and ureter, unspecified; Signs and symptoms; Shortness of breath;   Additional info: Possible lung mass     TECHNIQUE:    XR of the chest, 2 views.     COMPARISON:    CT CHEST WO CONTRAST 6/11/2013 8:36 AM     FINDINGS:    Tubes, catheters and devices:  A pacemaker device is present, and its leads   are in appropriate position.    Lungs: Unremarkable. No consolidation.    Pleural space: Unremarkable. No pleural effusion. No pneumothorax.    Heart/Mediastinum:  The heart demonstrates diffuse enlargement.    Bones/joints:  There are degenerative changes of the spine.       Impression:       No acute abnormality.    THIS DOCUMENT HAS BEEN ELECTRONICALLY SIGNED BY FORTUNATO SANTOS MD               I have reviewed the medications:    Current Facility-Administered Medications:   •  dextrose (D50W) 25 g/ 50mL Intravenous Solution 25 g, 25 g, Intravenous, Q15 Min PRN, Surekha Lopez, APRN  •  dextrose (GLUTOSE) oral gel 15 g, 15 g, Oral, Q15 Min PRN, Surekha Lopez, APRN  •  famotidine (PEPCID) tablet 10 mg, 10 mg, Oral, Daily, Nancy, Liliana, APRN, 10 mg at 01/12/19 0829  •  fluticasone (FLONASE) 50 MCG/ACT nasal spray 2 spray, 2 spray, Nasal, Daily, Nancy, Liliana, APRN, 2 spray at 01/12/19 0829  •  glucagon (human recombinant) (GLUCAGEN DIAGNOSTIC) injection 1 mg, 1 mg, Subcutaneous, PRN, Surekha Lopez, APRN  •  insulin lispro (humaLOG) injection 0-7 Units, 0-7 Units,  Subcutaneous, 4x Daily With Meals & Nightly, Surekha Lopez APRN  •  ipratropium-albuterol (DUO-NEB) nebulizer solution 3 mL, 3 mL, Nebulization, 4x Daily - RT, Liliana Cruz APRN, 3 mL at 01/12/19 0827  •  [START ON 1/13/2019] levothyroxine (SYNTHROID, LEVOTHROID) tablet 75 mcg, 75 mcg, Oral, Daily, Lesly Galindo MD  •  loperamide (IMODIUM) capsule 4 mg, 4 mg, Oral, 4x Daily PRN, Lesly Galindo MD  •  ondansetron (ZOFRAN) tablet 4 mg, 4 mg, Oral, Q6H PRN **OR** ondansetron (ZOFRAN) injection 4 mg, 4 mg, Intravenous, Q6H PRN, Surekha Lopez APRN  •  sodium chloride 0.9 % flush 3 mL, 3 mL, Intravenous, Q12H, Surekha Lopez APRN, 3 mL at 01/12/19 0829  •  sodium chloride 0.9 % flush 3-10 mL, 3-10 mL, Intravenous, PRN, Surekha Lopez APRLUANA  •  sodium phosphates 21 mmol in Sodium chloride 0.9 % 250 mL IVPB, 21 mmol, Intravenous, PRN **OR** sodium phosphates 15 mmol in Sodium chloride 0.9 % 250 mL IVPB, 15 mmol, Intravenous, PRN **OR** [DISCONTINUED] sodium phosphates 9 mmol in Sodium chloride 0.9 % 250 mL IVPB, 9 mmol, Intravenous, PRN, Roe Jones MD      Assessment/Plan   Assessment / Plan     Active Hospital Problems    Diagnosis Date Noted   • Hypophosphatemia [E83.39] 01/11/2019   • Hypercalcemia [E83.52] 01/11/2019   • COPD (chronic obstructive pulmonary disease) (CMS/HCC) [J44.9] 01/11/2019   • Supplemental oxygen dependent [Z99.81] 01/11/2019   • Acute renal failure superimposed on stage 3 chronic kidney disease (CMS/HCC) [N17.9, N18.3] 01/11/2019   • LORRAINE (obstructive sleep apnea) [G47.33] 10/12/2016   • Adrenal cortical adenoma [D35.00] 06/24/2016     Description: A.  3.5 cm water density left adrenal mass consistent with adenoma noted on CT scan of 06/11/2013, unchanged in size from previous scans. Labs 5/2015 - normal cortisol and cats/mets     • Controlled type 2 diabetes mellitus without complication, without long-term current use of insulin (CMS/HCC) [E11.9]  06/24/2016   • Hypertension [I10] 06/24/2016          Brief Hospital Course to date:  Anny Santos is a 63 year old female presents to the ED due to abnormal calcium level and phosphorus.        Hypercalcemia  ----improving with IVF, now down to 12.4; noted that patient has had issues with hypercalcemia in the past and follows with Dr. Vela of Endocrine-- extensive w/u in the past has been done  ---CXR done without concern for malignancy, TSH wnl, most recent PTH 9  ---will continue IVF and ask nephrology to evaluate, could consider endocrine evaluation    Hypophosphatemia, improving  ---improved with replacement  ---etiology unclear, will ask nephrology for assistance    TAYLOR on CKD III  ---improving, hold nephrotoxic agents  ---noted adrenal nodule--- urine studies ordered on admission and pending   ---await nephro input today    T2DM  ---SSI, A1C 4.7    COPD  ---at home baseline O2, continue home inhalers     HTN  ---continue home meds           DVT Prophylaxis:  lovenox    Disposition: I expect the patient to be discharged pending further w/u     CODE STATUS:   Code Status and Medical Interventions:   Ordered at: 01/11/19 8384     Level Of Support Discussed With:    Patient     Code Status:    CPR     Medical Interventions (Level of Support Prior to Arrest):    Full         Electronically signed by Lesly Galindo MD, 01/12/19, 11:57 AM.

## 2019-01-12 NOTE — H&P
"[facility] Medicine Services  HISTORY AND PHYSICAL    Patient Name: Anny Santos  : 1955  MRN: [id number]  Primary Care Physician: Mahendra Moreno MD    Subjective   Subjective     Chief Complaint:  Abnormal lab    HPI:  Anny Santos is a 63 y.o. female with a past medical history significant for COPD, iron deficiency anemia, atrial fibrillation, CKD stage III, LORRAINE, diabetes mellitus type 2: controlled, and essential hypertension who presents to the ED due to abnormal labs.  Patient states that several months ago she was started on sotalol for atrial fibrillation.  She states that the sotalol made her feel \"awful\" and her heart rate was dropping in the 30's therefore she was changed to amiodarone. Amiodarone made her thyroid levels abnormal and she had to start on levothyroxine.  Initially she was started on 25 mcg and then changed to 75 mcg.  Patient went to follow up with her PCP on Thursday since she has been consistently dealing with elevated calcium levels.  She was called today and was notified she need to present to the ED for further workup of her elevated calcium levels.  She reports mild dizziness, blurry vision changes, and tremors at times.  She denies any known fever, chills, nausea, vomiting, diarrhea, abdominal pain, chest pain or worsening shortness of air.  Patient will be admitted to [facility] under the care of the Hospitalist for further evaluation and treatment.       Review of Systems   Constitutional: Negative for activity change, appetite change, chills, diaphoresis, fatigue, fever and unexpected weight change.   HENT: Negative.    Eyes: Positive for visual disturbance. Negative for photophobia.   Respiratory: Negative for cough and shortness of breath.    Cardiovascular: Negative for chest pain, palpitations and leg swelling.   Gastrointestinal: Negative for abdominal distention, abdominal pain, diarrhea, nausea and vomiting.   Genitourinary: Negative.  "   Musculoskeletal: Negative.    Skin: Negative.    Neurological: Positive for dizziness and tremors. Negative for syncope, weakness and numbness.   Psychiatric/Behavioral: Negative.         Otherwise 10-system ROS reviewed and is negative except as mentioned in the HPI.    Personal History     Past Medical History:   Diagnosis Date   • Abnormal finding on lung imaging    • Adrenal cortical adenoma     A.  3.5 cm water density left adrenal mass consistent with adenoma noted on CT scan of   06/11/2013, unchanged in size from previous scans. Labs 5/2015 - normal cortisol and cats/mets   • Allergic rhinitis    • Anemia    • Arthritis    • Atherosclerosis    • Backache    • Cholelithiasis     2 cm densely calcified gallstone incidentally noted on CT scan of the chest 06/11/2013, asymptomatic.   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    • H/O transfusion of whole blood    • Hypertension    • Impaired fasting glucose    • Ingrown nail    • Iron deficiency    • Lower extremity edema    • Muscle spasm    • Myopia    • Nephrolithiasis    • Osteoarthritis    • Palpitations    • Paroxysmal supraventricular tachycardia (CMS/HCC)    • Sinus problem    • Skin lesion of left lower extremity    • Vitamin B12 deficiency        Past Surgical History:   Procedure Laterality Date   • ADENOIDECTOMY     • ANGIOPLASTY  2013   • APPENDECTOMY     • CARDIAC PACEMAKER PLACEMENT     • DILATATION AND CURETTAGE  1971   • EAR TUBES      Ear Pressure Equalization   • LASIK  2007   • OTHER SURGICAL HISTORY      Catheter Ablation Atrial supraventricular Tachycardia   • TONSILLECTOMY         Family History: family history includes Arthritis in her other; Cancer in her other; Heart disease in her other; Hypertension in her other; Inflammatory bowel disease in her mother; Liver disease  in her brother, father, and mother; Migraines in her other; Obesity in her other; Stroke in her other; Tuberculosis in her other.     Social History:  reports that she quit smoking about 5 years ago. Her smoking use included cigarettes. she has never used smokeless tobacco. She reports that she does not drink alcohol or use drugs.    Medications:    (Not in a hospital admission)    Allergies   Allergen Reactions   • Fish-Derived Products    • Iodinated Diagnostic Agents Itching   • Minocin [Minocycline Hcl]    • Other    • Albuterol Palpitations       Objective   Objective     Vital Signs:   Temp:  [98.3 °F (36.8 °C)] 98.3 °F (36.8 °C)  Heart Rate:  [60-63] 63  Resp:  [18] 18  BP: (122)/(71) 122/71        Physical Exam   Constitutional: She is oriented to person, place, and time. She appears well-developed and well-nourished. No distress.   Alert and oriented x4   HENT:   Head: Normocephalic and atraumatic.   Eyes: Conjunctivae and EOM are normal. Pupils are equal, round, and reactive to light. Right eye exhibits no discharge. Left eye exhibits no discharge. No scleral icterus.   Neck: Normal range of motion. Neck supple. No JVD present. No tracheal deviation present. No thyromegaly present.   Cardiovascular: Normal rate, regular rhythm, normal heart sounds and intact distal pulses. Exam reveals no gallop and no friction rub.   No murmur heard.  Pulmonary/Chest: Effort normal. No stridor. No respiratory distress. She has wheezes. She has no rales. She exhibits no tenderness.   Abdominal: Soft. Bowel sounds are normal. She exhibits no distension and no mass. There is no tenderness. There is no rebound and no guarding. No hernia.   Musculoskeletal: Normal range of motion. She exhibits no edema, tenderness or deformity.   Lymphadenopathy:     She has no cervical adenopathy.   Neurological: She is alert and oriented to person, place, and time.   Skin: Skin is warm and dry. No rash noted. She is not diaphoretic. No  erythema. No pallor.   Psychiatric: She has a normal mood and affect. Her behavior is normal. Judgment and thought content normal.   Nursing note and vitals reviewed.       Results Reviewed:  I have personally reviewed current lab, radiology, and data and agree.    Results from last 7 days   Lab Units  01/11/19 1926   WBC 10*3/mm3  10.60   HEMOGLOBIN g/dL  9.2*   HEMATOCRIT %  28.6*   PLATELETS 10*3/mm3  221     Results from last 7 days   Lab Units  01/11/19 1926   SODIUM mmol/L  133   POTASSIUM mmol/L  4.3   CHLORIDE mmol/L  89*   CO2 mmol/L  36.0*   BUN mg/dL  57*   CREATININE mg/dL  3.10*   GLUCOSE mg/dL  147*   CALCIUM mg/dL  13.8*   ALT (SGPT) U/L  22   AST (SGOT) U/L  20     No results found for: BNP  No results found for: PHART, PCO2  Imaging Results (last 24 hours)     ** No results found for the last 24 hours. **             Assessment/Plan   Assessment / Plan     Active Hospital Problems    Diagnosis   • Hypophosphatemia   • Hypercalcemia   • COPD (chronic obstructive pulmonary disease) (CMS/HCC)   • Supplemental oxygen dependent   • Acute renal failure superimposed on stage 3 chronic kidney disease (CMS/[facility])   • LORRAINE (obstructive sleep apnea)   • Adrenal cortical adenoma     Description: A.  3.5 cm water density left adrenal mass consistent with adenoma noted on CT scan of 06/11/2013, unchanged in size from previous scans. Labs 5/2015 - normal cortisol and cats/mets     • Controlled type 2 diabetes mellitus without complication, without long-term current use of insulin (CMS/[facility])   • Hypertension         Assessment & Plan:  63 year old female presents to the ED due to abnormal calcium level and phosphorus.      1) Hypercalcemia  -etiology not completely clear  -? Underlying renal vs malignancy  -follows with Dr. Vela Endocrinology with extensive workup.  Per patient most recent PTH level is 9  -will consult nephrology in am  -check TSH  -ionized calcium 1.97  -consider consulting endocrinology     2)  "Hypophosphatemia (SYMPTOMATIC-WEAK/OSTEOPENIA)  -etiology unclear   -replace per protocol  -repeat labs in am  DIFFERENTIALS ARE VAST.   - NO HISTORY OF ETOH, NOT ON ANY TF, MOST RECENT PTH WNL, HOWEVER MOST RECENT TSH IS MILDLY ELEVATED-PT DOES HAVE HISTORY OF SECONDARY HYPOTHYROIDISM FROM AMIODARONE, CORTISOL LEVEL ALSO CHECKED IN THE PAST AND HAVE BEEN WNL, LFT'S WNL.    PT IS A SMOKER AND HAS OXYGEN DEPENDENT COPD. NO PRIOR CXR OR CT CHEST. PT DID HAVE CT A/P BEFORE, FOUND TO HAVE INCIDENTOLOMA, APPX 4CM. WILL GET CXR TO R/O LUNG MASS GIVEN HISTORY OF TOBACCO ABUSE. WILL ALSO GET PERIPHERAL BLOOD SMEAR, HAS MACROCYTIC ANEMIA, ETIOLOGY UNCLEAR, AGAIN NO ETOH, POSS FROM DECREASED PO INTAKE? WILL CHECK IRON PANEL/B12/FOLATE LEVEL. PT HAD C'SCOPE FEW TIMES (DATA DEFICIENT)-LAST ONE AT [facility], ONLY HAVE DIVERTICULOSIS PER PT.    CONSIDER PHARMACY CONSULT TO LOOK AT MEDICATIONS AS POTENTIAL CULPRIT  CONSIDER ENDOCRINOLOGY CONSULTATION  IN AM    3) TAYLOR on CKD stage III  -CT of abdomen in pelvis in past with noted adrenal cortical adenoma  -check urine studies  -consult nephrology in am  -repeat labs in am  -hold nephrotoxic medication  PT REPORTED THAT HER PCP INCREASED MY LASIX FROM 3 TO 4\" IN ATTEMPT TO LOWER HER CALCIUM LEVEL PER HER REPORT. SHE IS ON 20MG LASIX DAILY AT HOME IN ADDITION TO METOLAZONE 5MG DAILY.    4) Diabetes mellitus type 2: controlled  -hold metformin  -check hgb a1c  -start ldssi  -finger sticks achs    5) Macrocytic anemia  -H&H stable  -check iron/anemia studies. B12, Folate  -repeat labs in am  -monitor  SEE ABOVE, POSS MEDICATION SE, METFORMIN CAN CAUSE B12 DEFICIENCY  LAST C'SCOPE 2 YEARS AGO PER PT, ONLY HAS DIVERTICULOSIS    6) Oxygen dependent COPD  -wears 2 L continuously at home  -will check CXR  -continuous pulse ox  -keep o2 sat >90%  -prn duo-nebs    7) Essential hypertension  -continue home med    8) AFIB WITH RECENT ECV 2-3 WEEKS AGO BY DR SCHOEN PER PT. SHE ALSO REPORTS VARIOUS " TYPES OF ARHYTHMIAS-INCLUDING V-TACH AND HAS PPM. PT IS ON AMIO, UNABLE TO TOLERATE SOTALOL DUE TO PROFOUND HYPOTENSION. ON ELIQUIS      DVT prophylaxis:teds/scds, HAD BEEN ON ELIQUIS    CODE STATUS:  Full code   There are no questions and answers to display.       Admission Status:  I believe this patient meets INPATIENT status due to the need for care which can only be reasonably provided in an hospital setting such as possible need for aggressive/expedited ancillary services and/or consultation services, IV medications, close physician monitoring, and/or procedures.  In such, I feel patient’s risk for adverse outcomes and need for care warrant INPATIENT evaluation and predict the patient’s care encounter to likely last beyond 2 midnights.    TRESSA Garcia   01/11/19   11:05 PM      Brief Attending Admission Attestation     I have seen and examined the patient, performing an independent face-to-face diagnostic evaluation with plan of care reviewed and developed with the advanced practice clinician (APC).      Brief Summary Statement/HPI:   Anny Santos is a 63 y.o. female with very complex chronic medical problems that included, morbid obesity, Afib-s/p ECV and has PPM, but not sure if pt had prior ablation or not, but she reported that she had V-tach before and her heart stopped beating before, that's why she has a PPM; she is also a smoker and has O2 dep COPD (2L NC chronically), LORRAINE, CKD, HTN, and secondary hypothyroidism from Amiodarone (per pt report), who reported that she had been dealing with hypophosphatemia and hypercalcemia for about 6 months now. She stated that her PCP, cardiologist, and endocrinologist have been working together to try and figure things out, but have not had any answers yet so far. Today she was sent in due to low P and high Ca level. Pt stated that her PCP had been given her extra Lasix to try and lower her calcium. She c/o weakness, thirst, and was told that she has  osteopenia. In the ED, creatinine was noted to bump significantly as well, so pt will be admitted for further evaluation.      Attending Physical Exam:  General Assessment: No acute cardiopulmonary distress. Well developed and well nourished.    HEENT: NCAT, PERRL, lips/MM dry    Neck: Supple    CVS: RRR, S1S2 normal, no murmurs    Resp: CTAB, no adventitious sound    Abd: Obese, soft, NT, ND, normal BS, no guarding or peritoneal signs    Ext: No edema, both calves are symmetric and NTTP    Neuro: Speech clear, face symmetric, EVANGELISTA symmetrically, strength 5/5 in all extremities, reflex not checked    Skin: W/D/I. No rash.    Psych: Affect is appropriate    Brief Assessment/Plan :  See above for further detailed assessment and plan developed with APC which I have reviewed and/or edited.      Electronically signed by Marques Delvalle MD, 01/12/19, 12:16 AM.

## 2019-01-12 NOTE — CONSULTS
Referring Provider: Dr. Delvalle  Reason for Consultation: Acute renal failure, hypercalcemia    Subjective     Chief complaint dizziness, blurred vision and tremors    History of present illness:    63-year-old morbidly obese  female admitted with abnormal lab hypercalcemia, acute renal failure.  Patient has known about hypercalcemia for many years followed by a primary care physician.  Sent to the hospital from the PCP for elevated calcium and creatinine.  She stated that recently her diuretics was changed.  And also her vitamin D was decreased from 10,000 units per week to 5000 units.  She also had adrenal adenoma which is slightly increased from 3.5-4.0.  Patient denied use of nonsteroidals.  Denies Epistaxis, kidney stones, hemoptysis, hematemesis, recent UTI,  recent use of antibiotic , rash, gross hematuria.  No recent diarrhea    History  Past Medical History:   Diagnosis Date   • Abnormal finding on lung imaging    • Adrenal cortical adenoma     A.  3.5 cm water density left adrenal mass consistent with adenoma noted on CT scan of   06/11/2013, unchanged in size from previous scans. Labs 5/2015 - normal cortisol and cats/mets   • Allergic rhinitis    • Anemia    • Arthritis    • Atherosclerosis    • Backache    • Cholelithiasis     2 cm densely calcified gallstone incidentally noted on CT scan of the chest 06/11/2013, asymptomatic.   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    • H/O transfusion of whole blood    • Hypertension    • Impaired fasting glucose    • Ingrown nail    • Iron deficiency    • Lower extremity edema    • Muscle spasm    • Myopia    • Nephrolithiasis    • Osteoarthritis    • Palpitations    • Paroxysmal supraventricular tachycardia (CMS/HCC)    • Sinus problem    • Skin lesion of left lower extremity    •  Vitamin B12 deficiency    ,   Past Surgical History:   Procedure Laterality Date   • ADENOIDECTOMY     • ANGIOPLASTY     • APPENDECTOMY     • CARDIAC PACEMAKER PLACEMENT     • DILATATION AND CURETTAGE     • EAR TUBES      Ear Pressure Equalization   • LASIK     • OTHER SURGICAL HISTORY      Catheter Ablation Atrial supraventricular Tachycardia   • TONSILLECTOMY     ,   Family History   Problem Relation Age of Onset   • Liver disease Mother    • Inflammatory bowel disease Mother    • Liver disease Father    • Liver disease Brother    • Arthritis Other    • Cancer Other    • Heart disease Other    • Hypertension Other    • Obesity Other    • Migraines Other    • Stroke Other    • Tuberculosis Other    • Breast cancer Neg Hx    • Ovarian cancer Neg Hx    ,   Social History     Tobacco Use   • Smoking status: Former Smoker     Types: Cigarettes     Last attempt to quit: 2014     Years since quittin.0   • Smokeless tobacco: Never Used   • Tobacco comment: quit in    Substance Use Topics   • Alcohol use: No   • Drug use: No   ,   Medications Prior to Admission   Medication Sig Dispense Refill Last Dose   • amiodarone (PACERONE) 200 MG tablet Take 200 mg by mouth Daily.   2019 at 0900   • atorvastatin (LIPITOR) 40 MG tablet Take 40 mg by mouth Every Night.   2019 at 2200   • Coenzyme Q10 (COQ-10) 10 MG capsule Take 1 capsule by mouth Daily.   2019 at 1200   • Cyanocobalamin (VITAMIN B-12 PO) Take 1,000 mcg by mouth Daily.   2019 at 0900   • ELIQUIS 5 MG tablet tablet Take 5 mg by mouth Every 12 (Twelve) Hours.   2019 at 2100   • famotidine (PEPCID) 20 MG tablet Take 20 mg by mouth At Night As Needed for Indigestion or Heartburn.   Past Month at Unknown time   • fluticasone (FLONASE) 50 MCG/ACT nasal spray 2 sprays into the nostril(s) as directed by provider 2 (Two) Times a Day. Administer 2 sprays in each nostril for each dose.    2019 at 0900   • furosemide (LASIX) 20  MG tablet Take 1 tablet by mouth 2 (Two) Times a Day Before Meals. 1 Tab BID in divided doses (Patient taking differently: Take 20 mg by mouth 3 (Three) Times a Day. 1 Tab BID in divided doses) 180 tablet 1 1/12/2019 at 1200   • ipratropium-albuterol (DUO-NEB) 0.5-2.5 mg/mL nebulizer Take 1.5 mL by nebulization 2 (Two) Times a Day. Administer one 3 ML vial 4 times daily via Nebulization as needed for shortness of breath, wheezing   1/12/2019 at 1200   • levothyroxine (SYNTHROID, LEVOTHROID) 25 MCG tablet Take 1 tablet by mouth Daily. (Patient taking differently: Take 75 mcg by mouth Daily.) 30 tablet 5 1/12/2019 at 0400   • magnesium oxide (MAG-OX) 400 MG tablet Take 400 mg by mouth 3 (Three) Times a Day.   1/12/2019 at 1200   • metFORMIN (GLUCOPHAGE) 500 MG tablet Take 500 mg by mouth 2 (Two) Times a Day With Meals.   1/12/2019 at 0900   • metOLazone (ZAROXOLYN) 5 MG tablet Take Mon-Wed-Fri (Patient taking differently: Take 5 mg by mouth Take As Directed. Take Mon-Wed-Fri) 20 tablet 3 1/12/2019 at 0900   • metoprolol succinate XL (TOPROL-XL) 50 MG 24 hr tablet Take 50 mg by mouth 2 (Two) Times a Day. Patient takes 25mg in the morning and 25mg in the evening.   1/12/2019 at 0900   • potassium chloride (K-DUR,KLOR-CON) 10 MEQ CR tablet Take 5 tablets on Monday, Wednesday and Friday. Take 4 tablets on Tues, Thurs, Sat and Sunday (Patient taking differently: Take 10 mEq by mouth Take As Directed. Take 4 tablets on Monday, Wednesday and Friday. Take 3 tablets on Tues, Thurs, Sat and Sunday) 630 tablet 1 1/12/2019 at 0900   , Scheduled Meds:    enoxaparin 30 mg Subcutaneous Q24H   famotidine 10 mg Oral Daily   fluticasone 2 spray Nasal Daily   insulin lispro 0-7 Units Subcutaneous 4x Daily With Meals & Nightly   ipratropium-albuterol 3 mL Nebulization 4x Daily - RT   [START ON 1/13/2019] levothyroxine 75 mcg Oral Daily   sodium chloride 3 mL Intravenous Q12H   , Continuous Infusions:    Sodium chloride 75 mL/hr   , PRN  Meds:  •  dextrose  •  dextrose  •  glucagon (human recombinant)  •  loperamide  •  ondansetron **OR** ondansetron  •  sodium chloride  •  sodium phosphate IVPB **OR** sodium phosphate IVPB **OR** [DISCONTINUED] sodium phosphate IVPB and Allergies:  Fish-derived products; Iodinated diagnostic agents; Minocin [minocycline hcl]; Albuterol; Flexeril [cyclobenzaprine]; and Reglan [metoclopramide]    Review of Systems  Pertinent items are noted in HPI, all other systems reviewed and negative    Objective     Vital Signs  Temp:  [97.9 °F (36.6 °C)-98.5 °F (36.9 °C)] 97.9 °F (36.6 °C)  Heart Rate:  [60-71] 69  Resp:  [18-20] 20  BP: ()/(39-71) 112/49    I/O this shift:  In: 120 [P.O.:120]  Out: -   No intake/output data recorded.    Physical Exam:     General Appearance:    Alert, cooperative, in no acute distress, morbidly obese female    Head:    Normocephalic, without obvious abnormality, atraumatic   Eyes:           EOMI PERRLA   Ears:    Ears appear intact with no abnormalities noted   Throat:   No oral lesions, no thrush, oral mucosa moist   Neck:   No adenopathy, supple, trachea midline, no thyromegaly, no     carotid bruit, no JVD   Back:     No kyphosis present, no scoliosis     Lungs:     Clear to auscultation,respirations regular, even and                   unlabored    Heart:    Regular rhythm and normal rate, normal S1 and S2, no            murmur, no gallop, no rub, no click   Breast Exam:    Deferred   Abdomen:     Normal bowel sounds, no masses, no organomegaly, soft        non-tender, non-distended, no guarding, no rebound                 tenderness, morbidly obese    Genitalia:    Deferred   Extremities:   Moves all extremities well, no edema, no cyanosis, no              redness        Skin:   No bleeding, bruising or rash   Lymph nodes:   No palpable adenopathy   Neurologic:  Grossly intact no focal deficit.  Alert oriented ×3.  Psych mood and affect are normal and appropriate.  No sign of  depression.  Following commands        Results Review:   I reviewed the patient's new clinical results.    WBC WBC   Date Value Ref Range Status   01/12/2019 6.74 3.50 - 10.80 10*3/mm3 Final   01/11/2019 10.60 3.50 - 10.80 10*3/mm3 Final      HGB Hemoglobin   Date Value Ref Range Status   01/12/2019 8.3 (L) 11.5 - 15.5 g/dL Final   01/11/2019 9.2 (L) 11.5 - 15.5 g/dL Final      HCT Hematocrit   Date Value Ref Range Status   01/12/2019 25.8 (L) 34.5 - 44.0 % Final   01/11/2019 28.6 (L) 34.5 - 44.0 % Final      Platlets No results found for: LABPLAT   MCV MCV   Date Value Ref Range Status   01/12/2019 103.6 (H) 80.0 - 99.0 fL Final   01/11/2019 102.1 (H) 80.0 - 99.0 fL Final          Sodium Sodium   Date Value Ref Range Status   01/12/2019 134 132 - 146 mmol/L Final   01/11/2019 133 132 - 146 mmol/L Final      Potassium Potassium   Date Value Ref Range Status   01/12/2019 3.5 3.5 - 5.5 mmol/L Final   01/11/2019 4.3 3.5 - 5.5 mmol/L Final      Chloride Chloride   Date Value Ref Range Status   01/12/2019 94 (L) 99 - 109 mmol/L Final   01/11/2019 89 (L) 99 - 109 mmol/L Final      CO2 CO2   Date Value Ref Range Status   01/12/2019 36.0 (H) 20.0 - 31.0 mmol/L Final   01/11/2019 36.0 (H) 20.0 - 31.0 mmol/L Final      BUN BUN   Date Value Ref Range Status   01/12/2019 57 (H) 9 - 23 mg/dL Final   01/11/2019 57 (H) 9 - 23 mg/dL Final      Creatinine Creatinine   Date Value Ref Range Status   01/12/2019 2.91 (H) 0.60 - 1.30 mg/dL Final   01/11/2019 3.10 (H) 0.60 - 1.30 mg/dL Final      Calcium Calcium   Date Value Ref Range Status   01/12/2019 12.4 (H) 8.7 - 10.4 mg/dL Final   01/11/2019 13.8 (H) 8.7 - 10.4 mg/dL Final      PO4 No results found for: CAPO4   Albumin Albumin   Date Value Ref Range Status   01/11/2019 4.49 3.20 - 4.80 g/dL Final      Magnesium Magnesium   Date Value Ref Range Status   01/11/2019 2.1 1.3 - 2.7 mg/dL Final      Uric Acid No results found for: URICACID           enoxaparin 30 mg Subcutaneous Q24H    famotidine 10 mg Oral Daily   fluticasone 2 spray Nasal Daily   insulin lispro 0-7 Units Subcutaneous 4x Daily With Meals & Nightly   ipratropium-albuterol 3 mL Nebulization 4x Daily - RT   [START ON 1/13/2019] levothyroxine 75 mcg Oral Daily   sodium chloride 3 mL Intravenous Q12H       Sodium chloride 75 mL/hr       Assessment/Plan       Controlled type 2 diabetes mellitus without complication, without long-term current use of insulin (CMS/[facility])    Hypertension    Adrenal cortical adenoma    LORRAINE (obstructive sleep apnea)    Hypophosphatemia    Hypercalcemia    COPD (chronic obstructive pulmonary disease) (CMS/[facility])    Supplemental oxygen dependent    Acute renal failure superimposed on stage 3 chronic kidney disease (CMS/[facility])     1.  Hypercalcemia: Same level 13.0 Chronic has been followed by the primary care physician for some time according to the patient.  Patient intact PTH is low 9 likely suppressed due to hypercalcemia.  She also has some anemia which will require further workup including SIEP UIP to rule out myeloma.  2.  Acute kidney failure: Could be secondary to hypercalcemia, volume depletion, use of diuretics.  3.  Anemia with low iron level of 10% patient will require IV and oral iron.  4.  Hypo-phosphatemia: Accompanied with low intact PTH, replacement ordered.  Plan:  Continue with IV fluids.  Change IV fluid normal saline plus potassium increased rate 125 mL per hour.  Avoid nephrotoxic medications.  Keep systolic blood pressure greater than 100.  Check volume status.  Check labs in the morning.   Adjust medication for the new GFR.  Case discussed with the medical staff taking care of the patient.  Check SIEP, vitamin D level    I discussed the patients findings and my recommendations with patient    Vida Arrieta MD  01/12/19  @NOW

## 2019-01-13 LAB
25(OH)D3 SERPL-MCNC: 49.4 NG/ML
ALBUMIN SERPL-MCNC: 3.7 G/DL (ref 3.2–4.8)
ALBUMIN/GLOB SERPL: 2.5 G/DL (ref 1.5–2.5)
ALP SERPL-CCNC: 68 U/L (ref 25–100)
ALT SERPL W P-5'-P-CCNC: 25 U/L (ref 7–40)
ANION GAP SERPL CALCULATED.3IONS-SCNC: 0 MMOL/L (ref 3–11)
AST SERPL-CCNC: 23 U/L (ref 0–33)
BASOPHILS # BLD AUTO: 0.02 10*3/MM3 (ref 0–0.2)
BASOPHILS NFR BLD AUTO: 0.4 % (ref 0–1)
BILIRUB SERPL-MCNC: 0.4 MG/DL (ref 0.3–1.2)
BUN BLD-MCNC: 50 MG/DL (ref 9–23)
BUN/CREAT SERPL: 21.5 (ref 7–25)
CALCIUM SPEC-SCNC: 11 MG/DL (ref 8.7–10.4)
CHLORIDE SERPL-SCNC: 99 MMOL/L (ref 99–109)
CO2 SERPL-SCNC: 36 MMOL/L (ref 20–31)
CREAT BLD-MCNC: 2.33 MG/DL (ref 0.6–1.3)
CYTOLOGIST CVX/VAG CYTO: NORMAL
DEPRECATED RDW RBC AUTO: 59.6 FL (ref 37–54)
EOSINOPHIL # BLD AUTO: 0.13 10*3/MM3 (ref 0–0.3)
EOSINOPHIL NFR BLD AUTO: 2.7 % (ref 0–3)
ERYTHROCYTE [DISTWIDTH] IN BLOOD BY AUTOMATED COUNT: 15.9 % (ref 11.3–14.5)
GFR SERPL CREATININE-BSD FRML MDRD: 21 ML/MIN/1.73
GLOBULIN UR ELPH-MCNC: 1.5 GM/DL
GLUCOSE BLD-MCNC: 91 MG/DL (ref 70–100)
GLUCOSE BLDC GLUCOMTR-MCNC: 105 MG/DL (ref 70–130)
GLUCOSE BLDC GLUCOMTR-MCNC: 110 MG/DL (ref 70–130)
GLUCOSE BLDC GLUCOMTR-MCNC: 143 MG/DL (ref 70–130)
GLUCOSE BLDC GLUCOMTR-MCNC: 169 MG/DL (ref 70–130)
HCT VFR BLD AUTO: 24.2 % (ref 34.5–44)
HGB BLD-MCNC: 7.9 G/DL (ref 11.5–15.5)
IMM GRANULOCYTES # BLD AUTO: 0.01 10*3/MM3 (ref 0–0.03)
IMM GRANULOCYTES NFR BLD AUTO: 0.2 % (ref 0–0.6)
LYMPHOCYTES # BLD AUTO: 1.04 10*3/MM3 (ref 0.6–4.8)
LYMPHOCYTES NFR BLD AUTO: 21.5 % (ref 24–44)
MCH RBC QN AUTO: 33.8 PG (ref 27–31)
MCHC RBC AUTO-ENTMCNC: 32.6 G/DL (ref 32–36)
MCV RBC AUTO: 103.4 FL (ref 80–99)
MONOCYTES # BLD AUTO: 0.38 10*3/MM3 (ref 0–1)
MONOCYTES NFR BLD AUTO: 7.9 % (ref 0–12)
NEUTROPHILS # BLD AUTO: 3.27 10*3/MM3 (ref 1.5–8.3)
NEUTROPHILS NFR BLD AUTO: 67.5 % (ref 41–71)
PATH INTERP BLD-IMP: NORMAL
PLATELET # BLD AUTO: 178 10*3/MM3 (ref 150–450)
PMV BLD AUTO: 8.6 FL (ref 6–12)
POTASSIUM BLD-SCNC: 3.6 MMOL/L (ref 3.5–5.5)
PROT SERPL-MCNC: 5.2 G/DL (ref 5.7–8.2)
RBC # BLD AUTO: 2.34 10*6/MM3 (ref 3.89–5.14)
SODIUM BLD-SCNC: 135 MMOL/L (ref 132–146)
WBC NRBC COR # BLD: 4.84 10*3/MM3 (ref 3.5–10.8)

## 2019-01-13 PROCEDURE — 63710000001 APIXABAN 5 MG TABLET: Performed by: INTERNAL MEDICINE

## 2019-01-13 PROCEDURE — 82962 GLUCOSE BLOOD TEST: CPT

## 2019-01-13 PROCEDURE — 63710000001 METOPROLOL SUCCINATE XL 50 MG TABLET SUSTAINED-RELEASE 24 HOUR: Performed by: INTERNAL MEDICINE

## 2019-01-13 PROCEDURE — 97161 PT EVAL LOW COMPLEX 20 MIN: CPT

## 2019-01-13 PROCEDURE — 82784 ASSAY IGA/IGD/IGG/IGM EACH: CPT | Performed by: INTERNAL MEDICINE

## 2019-01-13 PROCEDURE — 25810000003 SODIUM CHLORIDE 0.9 % WITH KCL 20 MEQ 20-0.9 MEQ/L-% SOLUTION: Performed by: INTERNAL MEDICINE

## 2019-01-13 PROCEDURE — 86334 IMMUNOFIX E-PHORESIS SERUM: CPT | Performed by: INTERNAL MEDICINE

## 2019-01-13 PROCEDURE — 85025 COMPLETE CBC W/AUTO DIFF WBC: CPT | Performed by: INTERNAL MEDICINE

## 2019-01-13 PROCEDURE — A9270 NON-COVERED ITEM OR SERVICE: HCPCS | Performed by: INTERNAL MEDICINE

## 2019-01-13 PROCEDURE — 63710000001 LEVOTHYROXINE 75 MCG TABLET: Performed by: INTERNAL MEDICINE

## 2019-01-13 PROCEDURE — A9270 NON-COVERED ITEM OR SERVICE: HCPCS | Performed by: NURSE PRACTITIONER

## 2019-01-13 PROCEDURE — 94799 UNLISTED PULMONARY SVC/PX: CPT

## 2019-01-13 PROCEDURE — 63710000001 VITAMIN B-12 1000 MCG TABLET: Performed by: NURSE PRACTITIONER

## 2019-01-13 PROCEDURE — 94760 N-INVAS EAR/PLS OXIMETRY 1: CPT

## 2019-01-13 PROCEDURE — 63710000001 AMIODARONE 200 MG TABLET: Performed by: INTERNAL MEDICINE

## 2019-01-13 PROCEDURE — 94640 AIRWAY INHALATION TREATMENT: CPT

## 2019-01-13 PROCEDURE — 36415 COLL VENOUS BLD VENIPUNCTURE: CPT | Performed by: INTERNAL MEDICINE

## 2019-01-13 PROCEDURE — 85060 BLOOD SMEAR INTERPRETATION: CPT | Performed by: HOSPITALIST

## 2019-01-13 PROCEDURE — 82306 VITAMIN D 25 HYDROXY: CPT | Performed by: INTERNAL MEDICINE

## 2019-01-13 PROCEDURE — 80053 COMPREHEN METABOLIC PANEL: CPT | Performed by: INTERNAL MEDICINE

## 2019-01-13 RX ORDER — LANOLIN ALCOHOL/MO/W.PET/CERES
1000 CREAM (GRAM) TOPICAL DAILY
Status: DISCONTINUED | OUTPATIENT
Start: 2019-01-13 | End: 2019-01-15 | Stop reason: HOSPADM

## 2019-01-13 RX ORDER — SODIUM CHLORIDE AND POTASSIUM CHLORIDE 150; 900 MG/100ML; MG/100ML
60 INJECTION, SOLUTION INTRAVENOUS CONTINUOUS
Status: ACTIVE | OUTPATIENT
Start: 2019-01-13 | End: 2019-01-15

## 2019-01-13 RX ADMIN — IPRATROPIUM BROMIDE AND ALBUTEROL SULFATE 3 ML: 2.5; .5 SOLUTION RESPIRATORY (INHALATION) at 12:54

## 2019-01-13 RX ADMIN — LEVOTHYROXINE SODIUM 75 MCG: 75 TABLET ORAL at 05:32

## 2019-01-13 RX ADMIN — IPRATROPIUM BROMIDE AND ALBUTEROL SULFATE 3 ML: 2.5; .5 SOLUTION RESPIRATORY (INHALATION) at 15:25

## 2019-01-13 RX ADMIN — ATORVASTATIN CALCIUM 40 MG: 40 TABLET, FILM COATED ORAL at 20:29

## 2019-01-13 RX ADMIN — SODIUM CHLORIDE, PRESERVATIVE FREE 3 ML: 5 INJECTION INTRAVENOUS at 20:29

## 2019-01-13 RX ADMIN — APIXABAN 5 MG: 5 TABLET, FILM COATED ORAL at 09:03

## 2019-01-13 RX ADMIN — POTASSIUM CHLORIDE AND SODIUM CHLORIDE 100 ML/HR: 900; 150 INJECTION, SOLUTION INTRAVENOUS at 13:21

## 2019-01-13 RX ADMIN — POTASSIUM CHLORIDE AND SODIUM CHLORIDE 125 ML/HR: 900; 150 INJECTION, SOLUTION INTRAVENOUS at 09:04

## 2019-01-13 RX ADMIN — AMIODARONE HYDROCHLORIDE 200 MG: 200 TABLET ORAL at 09:03

## 2019-01-13 RX ADMIN — IPRATROPIUM BROMIDE AND ALBUTEROL SULFATE 3 ML: 2.5; .5 SOLUTION RESPIRATORY (INHALATION) at 21:08

## 2019-01-13 RX ADMIN — SODIUM CHLORIDE, PRESERVATIVE FREE 3 ML: 5 INJECTION INTRAVENOUS at 09:05

## 2019-01-13 RX ADMIN — CYANOCOBALAMIN TAB 1000 MCG 1000 MCG: 1000 TAB at 13:22

## 2019-01-13 RX ADMIN — APIXABAN 5 MG: 5 TABLET, FILM COATED ORAL at 20:29

## 2019-01-13 RX ADMIN — FLUTICASONE PROPIONATE 2 SPRAY: 50 SPRAY, METERED NASAL at 09:05

## 2019-01-13 RX ADMIN — METOPROLOL SUCCINATE 50 MG: 50 TABLET, EXTENDED RELEASE ORAL at 09:03

## 2019-01-13 RX ADMIN — FAMOTIDINE 10 MG: 20 TABLET ORAL at 21:22

## 2019-01-13 RX ADMIN — IPRATROPIUM BROMIDE AND ALBUTEROL SULFATE 3 ML: 2.5; .5 SOLUTION RESPIRATORY (INHALATION) at 07:36

## 2019-01-13 NOTE — THERAPY EVALUATION
Acute Care - Physical Therapy Initial Evaluation  [facility]     Patient Name: Anny Santos  : 1955  MRN: [id number]  Today's Date: 2019   Onset of Illness/Injury or Date of Surgery: 19  Date of Referral to PT: 19  Referring Physician: Dr. Galindo      Admit Date: 2019    Visit Dx:     ICD-10-CM ICD-9-CM   1. Hypophosphatemia E83.39 275.3   2. Hypercalcemia E83.52 275.42   3. Acute on chronic renal insufficiency N28.9 593.9    N18.9 585.9   4. Elevated blood pressure reading with diagnosis of hypertension I10 401.9   5. Impaired functional mobility, balance, gait, and endurance Z74.09 V49.89     Patient Active Problem List   Diagnosis   • Controlled type 2 diabetes mellitus without complication, without long-term current use of insulin (CMS/HCC)   • Steroid-induced diabetes mellitus (CMS/HCC)   • Vitamin D deficiency   • Hypertension   • Adrenal cortical adenoma   • Localized edema   • Abnormal findings on diagnostic imaging of lung   • Chronic bronchitis (CMS/HCC)   • Atherosclerosis   • LORRAINE (obstructive sleep apnea)   • Paroxysmal supraventricular tachycardia (CMS/HCC)   • Contrast media allergy   • Hypercalcemia   • Hypophosphatemia   • Hypercalcemia   • COPD (chronic obstructive pulmonary disease) (CMS/HCC)   • Supplemental oxygen dependent   • Acute renal failure superimposed on stage 3 chronic kidney disease (CMS/HCC)     Past Medical History:   Diagnosis Date   • Abnormal finding on lung imaging    • Adrenal cortical adenoma     A.  3.5 cm water density left adrenal mass consistent with adenoma noted on CT scan of   2013, unchanged in size from previous scans. Labs 2015 - normal cortisol and cats/mets   • Allergic rhinitis    • Anemia    • Arthritis    • Atherosclerosis    • Backache    • Cholelithiasis     2 cm densely calcified gallstone incidentally noted on CT scan of the chest 2013, asymptomatic.   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    • H/O transfusion of whole blood    • Hypertension    • Impaired fasting glucose    • Ingrown nail    • Iron deficiency    • Lower extremity edema    • Muscle spasm    • Myopia    • Nephrolithiasis    • Osteoarthritis    • Palpitations    • Paroxysmal supraventricular tachycardia (CMS/HCC)    • Sinus problem    • Skin lesion of left lower extremity    • Vitamin B12 deficiency      Past Surgical History:   Procedure Laterality Date   • ADENOIDECTOMY     • ANGIOPLASTY  2013   • APPENDECTOMY     • CARDIAC PACEMAKER PLACEMENT     • DILATATION AND CURETTAGE  1971   • EAR TUBES      Ear Pressure Equalization   • LASIK  2007   • OTHER SURGICAL HISTORY      Catheter Ablation Atrial supraventricular Tachycardia   • TONSILLECTOMY          PT ASSESSMENT (last 12 hours)      Physical Therapy Evaluation     Row Name 01/13/19 0740          PT Evaluation Time/Intention    Subjective Information  complains of;weakness;fatigue  -LF     Document Type  evaluation  -LF     Mode of Treatment  physical therapy  -LF     Patient Effort  adequate  -LF     Symptoms Noted During/After Treatment  other (see comments) epidosode LOB/knees buckling when standing bedside w/o walke  -     Row Name 01/13/19 9095          General Information    Patient Profile Reviewed?  yes  -LF     Onset of Illness/Injury or Date of Surgery  01/12/19  -     Referring Physician  Dr. Galindo  -     Patient Observations  alert;cooperative;agree to therapy  -LF     Patient/Family Observations  no family present  -     General Observations of Patient  patient sidelying in bed, IV LUE, O2NC w/   -LF     Prior Level of Function  independent:;all household mobility;ADL's;community mobility uses walker outside home; holds to furniture inside home  -LF     Equipment Currently Used at Home  walker,  rolling;oxygen  -LF     Existing Precautions/Restrictions  oxygen therapy device and L/min  -LF     Risks Reviewed  patient:;LOB;dizziness;change in vital signs;lines disloged  -LF     Benefits Reviewed  patient:;improve function;increase independence;increase strength  -     Barriers to Rehab  previous functional deficit  -LF     Row Name 01/13/19 0950          Relationship/Environment    Lives With  alone  -LF     Family Caregiver if Needed  none  -     Row Name 01/13/19 0950          Resource/Environmental Concerns    Current Living Arrangements  home/apartment/condo  -     Row Name 01/13/19 0950          Cognitive Assessment/Intervention- PT/OT    Orientation Status (Cognition)  oriented x 3  -LF     Follows Commands (Cognition)  WFL  -LF     Safety Deficit (Cognitive)  mild deficit;awareness of need for assistance  -     Row Name 01/13/19 0950          Bed Mobility Assessment/Treatment    Bed Mobility Assessment/Treatment  supine-sit  -LF     Supine-[place] (Bed Mobility)  supervision  -     Assistive Device (Bed Mobility)  bed rails;head of bed elevated  -     Row Name 01/13/19 0950          Transfer Assessment/Treatment    Transfer Assessment/Treatment  sit-stand transfer;stand-sit transfer  -     Comment (Transfers)  Patient stood prior to P.T. having lines arranged.  She was standing w/o walking and abruptly sat back down stating her knee gave out  -     Sit-[place] (Transfers)  supervision  -     Stand-[place] (Transfers)  contact guard  -     Row Name 01/13/19 0950          Gait/Stairs Assessment/Training    Gait/Stairs Assessment/Training  gait/ambulation independence;gait/ambulation assistive device;distance ambulated  -     [place] (Gait)  contact guard  -     Assistive Device (Gait)  walker, front-wheeled  -     Distance in Feet (Gait)  20  -LF     Row Name 01/13/19 0950          General ROM    GENERAL ROM COMMENTS  LE ROM is WNL's;  Shoulder flex impaired 50% on L (she says she injured over a year ago due to a fall); CHLOÉ WFL's  -[facility] Name 01/13/19 0950          MMT (Manual Muscle Testing)    General MMT Comments  LE strength 4/5   -[facility] Name 01/13/19 0950          Motor Assessment/Intervention    Additional Documentation  Balance (Group)  -[facility] Name 01/13/19 0950          Balance    Balance  static sitting balance;static standing balance  -[facility] Name 01/13/19 0950          Static Sitting Balance    Level of [place] (Unsupported Sitting, Static Balance)  independent  -     Time Able to Maintain Position (Unsupported Sitting, Static Balance)  4 to 5 minutes  -[facility] Name 01/13/19 0950          Static Standing Balance    Level of [place] (Supported Standing, Static Balance)  contact guard assist w/o A.D.  -     Time Able to Maintain Position (Supported Standing, Static Balance)  1 to 2 minutes  -     Comment (Unsupported Standing, Static Balance)  independent w/ Rwx  -[facility] Name 01/13/19 0950          Pain Assessment    Additional Documentation  Pain Scale: Numbers Pre/Post-Treatment (Group)  -LF     Row Name 01/13/19 0950          Pain Scale: Numbers Pre/Post-Treatment    Pain Scale: Numbers, Pretreatment  0/10 - no pain  -     Pain Scale: Numbers, Post-Treatment  0/10 - no pain  -[facility] Name 01/13/19 0950          Physical Therapy Clinical Impression    Date of Referral to PT  01/12/19  -     PT Diagnosis (PT Clinical Impression)  impaired functional mobility  -     Patient/Family Goals Statement (PT Clinical Impression)  go home  -     Care Plan Review (PT)  evaluation/treatment results reviewed;care plan/treatment goals reviewed;risks/benefits reviewed;current/potential barriers reviewed;patient/other agree to care plan  -[facility] Name 01/13/19 0939          Vital Signs    Pre Systolic BP Rehab  125  -LF     Pre Treatment Diastolic BP  59  -LF     Pretreatment Heart Rate (beats/min)   62  -LF     Posttreatment Heart Rate (beats/min)  60  -LF     Pre SpO2 (%)  90  -LF     O2 Delivery Pre Treatment  supplemental O2  -LF     Post SpO2 (%)  86  -LF     O2 Delivery Post Treatment  supplemental O2  -LF     Pre Patient Position  Supine  -LF     Intra Patient Position  Standing  -LF     Post Patient Position  Sitting  -LF     Row Name 01/13/19 0950          Physical Therapy Goals    Bed Mobility Goal Selection (PT)  bed mobility, PT goal 1  -LF     Transfer Goal Selection (PT)  transfer, PT goal 1  -LF     Gait Training Goal Selection (PT)  gait training, PT goal 1  -LF     Row Name 01/13/19 0950          Bed Mobility Goal 1 (PT)    Activity/Assistive Device (Bed Mobility Goal 1, PT)  bed mobility activities, all  -LF     [place] Level/Cues Needed (Bed Mobility Goal 1, PT)  independent  -LF     Time Frame (Bed Mobility Goal 1, PT)  10 days  -LF     Row Name 01/13/19 0950          Transfer Goal 1 (PT)    Activity/Assistive Device (Transfer Goal 1, PT)  sit-to-stand/stand-to-sit;bed-to-chair/chair-to-bed  -LF     [place] Level/Cues Needed (Transfer Goal 1, PT)  independent  -LF     Time Frame (Transfer Goal 1, PT)  10 days  -LF     Row Name 01/13/19 0950          Gait Training Goal 1 (PT)    Activity/Assistive Device (Gait Training Goal 1, PT)  gait (walking locomotion);assistive device use  -LF     [place] Level (Gait Training Goal 1, PT)  independent  -LF     Distance (Gait Goal 1, PT)  150  -LF     Time Frame (Gait Training Goal 1, PT)  10 days  -     Row Name 01/13/19 0950          Positioning and Restraints    Pre-Treatment Position  in bed  -LF     Post Treatment Position  bed  -LF     In Bed  sitting EOB;notified nsg;call light within reach;encouraged to call for assist  -LF       User Key  (r) = Recorded By, (t) = Taken By, (c) = Cosigned By    Initials Name Provider Type    Ana Edouard PT Physical Therapist          PT Recommendation and Plan  Anticipated Discharge  Disposition (PT): home with home health  Outcome Summary/Treatment Plan (PT)  Anticipated Discharge Disposition (PT): home with home health  Outcome Measures     Row Name 01/13/19 0950             How much help from another person do you currently need...    Turning from your back to your side while in flat bed without using bedrails?  4  -LF      Moving from lying on back to sitting on the side of a flat bed without bedrails?  4  -LF      Moving to and from a bed to a chair (including a wheelchair)?  3  -LF      Standing up from a chair using your arms (e.g., wheelchair, bedside chair)?  3  -LF      Climbing 3-5 steps with a railing?  3  -LF      To walk in hospital room?  3  -LF      AM-PAC 6 Clicks Score  20  -LF         Functional Assessment    Outcome Measure Options  AM-PAC 6 Clicks Basic Mobility (PT)  -LF        User Key  (r) = Recorded By, (t) = Taken By, (c) = Cosigned By    Initials Name Provider Type     Ana Tsang PT Physical Therapist         Time Calculation:   PT Charges     Row Name 01/13/19 0950             Time Calculation    Start Time  0950  -        User Key  (r) = Recorded By, (t) = Taken By, (c) = Cosigned By    Initials Name Provider Type     Ana Tsang PT Physical Therapist        Therapy Suggested Charges     Code   Minutes Charges    None           Therapy Charges for Today     Code Description Service Date Service Provider Modifiers Qty    [id number]  PT EVAL LOW COMPLEXITY 4 1/13/2019 Ana Tsang PT GP 1          PT G-Codes  Outcome Measure Options: AM-PAC 6 Clicks Basic Mobility (PT)  AM-PAC 6 Clicks Score: 20      Ana Tsang PT  1/13/2019

## 2019-01-13 NOTE — PROGRESS NOTES
"    [facility]  PROGRESS NOTE    Patient Name: Anny Santos  : 1955  MRN: [id number]    Date of Admission: 2019  Length of Stay: 0  Primary Care Physician: Mahendra Moreno MD    Subjective   Subjective     CC:  Electrolyte abnls    HPI:  Pt is seen resting up in bed in NAD.  No visitors at bs.  States \"I'm note feeling real good today\".  Upset that she didn't sleep well, isn't on all of her prior home meds, and hates the food.  States \"it's dog food or something\".  Irritable.  C/o mild intermittent nausea.  No vomiting or diarrhea.  No abd pain, cp.  Lower extremity edema better.      Review of Systems  Gen- No fevers, chills  CV- No chest pain, palpitations  Resp- No cough, dyspnea  GI- as above. No V/D, abd pain      Otherwise ROS is negative except as mentioned in the HPI.    Objective   Objective     Vital Signs:   Temp:  [98 °F (36.7 °C)-98.7 °F (37.1 °C)] 98 °F (36.7 °C)  Heart Rate:  [55-70] 70  Resp:  [18-20] 20  BP: ()/(52-70) 114/70        Physical Exam:  Constitutional: No acute distress, awake, alert.  Sitting up in bed.  No visitors at bs.    HENT: NCAT, mucous membranes moist  Respiratory: Clear to auscultation bilaterally but decreased at bases, respiratory effort normal.  Sats 96 % on 2LNC.   Cardiovascular: RRR, no murmurs, rubs, or gallops, palpable pedal pulses bilaterally  Gastrointestinal: Positive bowel sounds, soft, nontender, nondistended.  Obese   Musculoskeletal: trace bilateral ankle edema.  EVANGELISTA spont   Psychiatric: Appropriate affect, cooperative and calm   Neurologic: Oriented x 3, strength symmetric in all extremities, Cranial Nerves grossly intact to confrontation, speech clear and follows commands   Skin: No rashes        Results Reviewed:  I have personally reviewed current lab, radiology, and data and agree.    Results from last 7 days   Lab Units  19   0555  19   0541  19   1926   WBC 10*3/mm3  4.84  " 6.74  10.60   HEMOGLOBIN g/dL  7.9*  8.3*  9.2*   HEMATOCRIT %  24.2*  25.8*  28.6*   PLATELETS 10*3/mm3  178  184  221     Results from last 7 days   Lab Units  01/13/19   0555  01/12/19   0541  01/11/19 1926   SODIUM mmol/L  135  134  133   POTASSIUM mmol/L  3.6  3.5  4.3   CHLORIDE mmol/L  99  94*  89*   CO2 mmol/L  36.0*  36.0*  36.0*   BUN mg/dL  50*  57*  57*   CREATININE mg/dL  2.33*  2.91*  3.10*   GLUCOSE mg/dL  91  89  147*   CALCIUM mg/dL  11.0*  12.4*  13.8*   ALT (SGPT) U/L  25   --   22   AST (SGOT) U/L  23   --   20     Estimated Creatinine Clearance: 28.5 mL/min (A) (by C-G formula based on SCr of 2.33 mg/dL (H)).    No results found for: BNP    Microbiology Results Abnormal     None          Imaging Results (last 24 hours)     ** No results found for the last 24 hours. **               I have reviewed the medications:    Current Facility-Administered Medications:   •  amiodarone (PACERONE) tablet 200 mg, 200 mg, Oral, Q24H, Lesly Galindo MD, 200 mg at 01/13/19 0903  •  apixaban (ELIQUIS) tablet 5 mg, 5 mg, Oral, Q12H, Lesly Galindo MD, 5 mg at 01/13/19 0903  •  atorvastatin (LIPITOR) tablet 40 mg, 40 mg, Oral, Nightly, Lesly Galindo MD, 40 mg at 01/12/19 2105  •  dextrose (D50W) 25 g/ 50mL Intravenous Solution 25 g, 25 g, Intravenous, Q15 Min PRN, Surekha Lopez APRN  •  dextrose (GLUTOSE) oral gel 15 g, 15 g, Oral, Q15 Min PRN, Surekha Lopez, APRN  •  famotidine (PEPCID) tablet 10 mg, 10 mg, Oral, Daily, NancyLiliana izaguirre APRN, 10 mg at 01/12/19 0829  •  fluticasone (FLONASE) 50 MCG/ACT nasal spray 2 spray, 2 spray, Nasal, Daily, Liliana Cruz, TRESSA, 2 spray at 01/13/19 0905  •  glucagon (human recombinant) (GLUCAGEN DIAGNOSTIC) injection 1 mg, 1 mg, Subcutaneous, PRN, Surekha Lopez, TRESSA  •  insulin lispro (humaLOG) injection 0-7 Units, 0-7 Units, Subcutaneous, 4x Daily With Meals & Nightly, Surekha Lopez, TRESSA  •  ipratropium-albuterol (DUO-NEB) nebulizer  solution 3 mL, 3 mL, Nebulization, 4x Daily - RT, Liliana Cruz, APRN, 3 mL at 01/13/19 1254  •  levothyroxine (SYNTHROID, LEVOTHROID) tablet 75 mcg, 75 mcg, Oral, Daily, Lesly Galindo MD, 75 mcg at 01/13/19 0532  •  loperamide (IMODIUM) capsule 4 mg, 4 mg, Oral, 4x Daily PRN, Lesly Galindo MD, 4 mg at 01/12/19 1552  •  metoprolol succinate XL (TOPROL-XL) 24 hr tablet 50 mg, 50 mg, Oral, Q24H, Lesly Galindo MD, 50 mg at 01/13/19 0903  •  ondansetron (ZOFRAN) tablet 4 mg, 4 mg, Oral, Q6H PRN **OR** ondansetron (ZOFRAN) injection 4 mg, 4 mg, Intravenous, Q6H PRN, Surekha Lopez APRN  •  sodium chloride 0.9 % flush 3 mL, 3 mL, Intravenous, Q12H, Surekha Lopez APRN, 3 mL at 01/13/19 0905  •  sodium chloride 0.9 % flush 3-10 mL, 3-10 mL, Intravenous, PRN, Surekha Lopez APRN  •  sodium chloride 0.9 % with KCl 20 mEq/L infusion, 125 mL/hr, Intravenous, Continuous, Vida Arrieta MD, Last Rate: 125 mL/hr at 01/13/19 1322, 125 mL/hr at 01/13/19 1322  •  sodium chloride 0.9 % with KCl 20 mEq/L infusion, 100 mL/hr, Intravenous, Continuous, Vida Arrieta MD, Last Rate: 100 mL/hr at 01/13/19 1321, 100 mL/hr at 01/13/19 1321  •  sodium phosphates 21 mmol in Sodium chloride 0.9 % 250 mL IVPB, 21 mmol, Intravenous, PRN **OR** sodium phosphates 15 mmol in Sodium chloride 0.9 % 250 mL IVPB, 15 mmol, Intravenous, PRN, Last Rate: 62.5 mL/hr at 01/12/19 1551, 15 mmol at 01/12/19 1551 **OR** [DISCONTINUED] sodium phosphates 9 mmol in Sodium chloride 0.9 % 250 mL IVPB, 9 mmol, Intravenous, PRN, Roe Jones MD  •  vitamin B-12 (CYANOCOBALAMIN) tablet 1,000 mcg, 1,000 mcg, Oral, Daily, Kalina Wallace, TRESSA, 1,000 mcg at 01/13/19 1322      Assessment/Plan   Assessment / Plan     Active Hospital Problems    Diagnosis Date Noted   • Hypophosphatemia [E83.39] 01/11/2019   • Hypercalcemia [E83.52] 01/11/2019   • COPD (chronic obstructive pulmonary disease) (CMS/[facility]) [J44.9]  01/11/2019   • Supplemental oxygen dependent [Z99.81] 01/11/2019   • Acute renal failure superimposed on stage 3 chronic kidney disease (CMS/HCC) [N17.9, N18.3] 01/11/2019   • LORRAINE (obstructive sleep apnea) [G47.33] 10/12/2016   • Adrenal cortical adenoma [D35.00] 06/24/2016     Description: A.  3.5 cm water density left adrenal mass consistent with adenoma noted on CT scan of 06/11/2013, unchanged in size from previous scans. Labs 5/2015 - normal cortisol and cats/mets     • Controlled type 2 diabetes mellitus without complication, without long-term current use of insulin (CMS/HCC) [E11.9] 06/24/2016   • Hypertension [I10] 06/24/2016          Brief Hospital Course to date:  Anny Santos is a 63 year old female presents to the ED due to abnormal calcium level and phosphorus.        Hypercalcemia  ----improving with IVF, now down to 11.0; noted that patient has had issues with hypercalcemia in the past and follows with Dr. Vela of Endocrine-- extensive w/u in the past has been done  ---CXR done without concern for malignancy, TSH wnl, most recent PTH 9  ---nephrology consulted  ---could consider endocrine evaluation  --IVFs and meds per neph  --labs per neph pending  --am labs     Hypophosphatemia, improving  ---improved with replacement  ---etiology unclear  --nephrology following    TAYLOR on CKD III  ---improving, hold nephrotoxic agents  ---noted adrenal nodule--- urine studies ordered on admission and pending   --per nephrology   --am labs     T2DM  ---SSI, A1C 4.7  Glucoses stable.  No change for now. monitor    COPD  ---at home baseline O2, continue home inhalers     HTN  ---continue home meds       DVT Prophylaxis:  lovenox    Disposition: I expect the patient to be discharged pending further w/u and per nephrology recs    CODE STATUS:   Code Status and Medical Interventions:   Ordered at: 01/11/19 5102     Level Of Support Discussed With:    Patient     Code Status:    CPR     Medical Interventions (Level of  Support Prior to Arrest):    Full         Electronically signed by Kalina Wallace, TRESSA, 01/13/19, 2:12 PM.

## 2019-01-13 NOTE — PLAN OF CARE
Problem: Patient Care Overview  Goal: Plan of Care Review  Outcome: Ongoing (interventions implemented as appropriate)   01/13/19 0220   Coping/Psychosocial   Plan of Care Reviewed With patient   Plan of Care Review   Progress improving   OTHER   Outcome Summary Pt. VSS. No reports of pain or discomfort. Resting well. Will continue to monitor.      Goal: Individualization and Mutuality  Outcome: Ongoing (interventions implemented as appropriate)    Goal: Discharge Needs Assessment  Outcome: Ongoing (interventions implemented as appropriate)    Goal: Interprofessional Rounds/Family Conf  Outcome: Ongoing (interventions implemented as appropriate)      Problem: Fall Risk (Adult)  Goal: Absence of Fall  Outcome: Ongoing (interventions implemented as appropriate)

## 2019-01-13 NOTE — PLAN OF CARE
Problem: Patient Care Overview  Goal: Plan of Care Review  Outcome: Ongoing (interventions implemented as appropriate)   01/13/19 7620   Coping/Psychosocial   Plan of Care Reviewed With patient   OTHER   Outcome Summary P.T. eval complete and patient presents with generalized weakness and decrease mobility. Ambulated CGA 20' w/ RWx. Requiring min. cues for increased safety w/ transfers. Further treatment indicated to increase safety and independence with mobility. Anticipate home with home health at d/c

## 2019-01-13 NOTE — PROGRESS NOTES
"   LOS: 0 days    Patient Care Team:  Mahendra Moreno MD as PCP - General (Family Medicine)    Chief Complaint:  Dizziness, blurred vision and tremors with hypercalcemia and acute renal failure  63-year-old morbidly obese  female admitted with abnormal lab hypercalcemia, acute renal failure.  Patient has known about hypercalcemia for many years followed by a primary care physician.  Sent to the hospital from the PCP for elevated calcium and creatinine.  She stated that recently her diuretics was changed.  And also her vitamin D was decreased from 10,000 units per week to 5000 units.  She also had adrenal adenoma which is slightly increased from 3.5-4.0        Subjective    Calcium improved, renal function slightly better.    Review of Systems:   Denies any nausea nausea, headache, blurring of vision, shortness of breath cough, dysuria or hematuria, diarrhea    Objective     Vital Sign Min/Max for last 24 hours  Temp  Min: 98 °F (36.7 °C)  Max: 98.7 °F (37.1 °C)   BP  Min: 96/52  Max: 114/70   Pulse  Min: 55  Max: 68   Resp  Min: 18  Max: 20   SpO2  Min: 94 %  Max: 97 %   No Data Recorded   No Data Recorded     Flowsheet Rows      First Filed Value   Admission Height  165.1 cm (65\") Documented at 01/11/2019 1848   Admission Weight  99.3 kg (219 lb) Documented at 01/11/2019 1848          I/O this shift:  In: 1000 [I.V.:1000]  Out: -   I/O last 3 completed shifts:  In: 1850 [P.O.:600; I.V.:1000; IV Piggyback:250]  Out: 1100 [Urine:1100]    Physical Exam:     General appearance: Morbidly obese female comfortable in bed.  HEENT normocephalic head eyes pupils equal reactive EOMI.  Oral mucosa moist.  Neck: Supple no JVD.  Lungs clear auscultation equal chest movement  Heart no gallop murmur or rub.  Abdomen: Mild mid obesity nontender positive bowel sounds  Extremities no edema cyanosis or clubbing.  Skin skin is warm and dry.  No rash noted.  Neuro: Grossly intact alert 20×3.       Results from last 7 days "   Lab Units  01/13/19   0555  01/12/19   0541  01/11/19   1926   WBC 10*3/mm3  4.84  6.74  10.60   HEMOGLOBIN g/dL  7.9*  8.3*  9.2*   HEMATOCRIT %  24.2*  25.8*  28.6*   PLATELETS 10*3/mm3  178  184  221   SODIUM mmol/L  135  134  133   POTASSIUM mmol/L  3.6  3.5  4.3   CHLORIDE mmol/L  99  94*  89*   CO2 mmol/L  36.0*  36.0*  36.0*   BUN mg/dL  50*  57*  57*   CREATININE mg/dL  2.33*  2.91*  3.10*   GLUCOSE mg/dL  91  89  147*   CALCIUM mg/dL  11.0*  12.4*  13.8*   PHOSPHORUS mg/dL   --   1.8*  0.8*          Results Review:     I reviewed the patient's new clinical results.      amiodarone 200 mg Oral Q24H   apixaban 5 mg Oral Q12H   atorvastatin 40 mg Oral Nightly   famotidine 10 mg Oral Daily   fluticasone 2 spray Nasal Daily   insulin lispro 0-7 Units Subcutaneous 4x Daily With Meals & Nightly   ipratropium-albuterol 3 mL Nebulization 4x Daily - RT   levothyroxine 75 mcg Oral Daily   metoprolol succinate XL 50 mg Oral Q24H   sodium chloride 3 mL Intravenous Q12H   vitamin B-12 1,000 mcg Oral Daily       sodium chloride 0.9 % with KCl 20 mEq 125 mL/hr Last Rate: 125 mL/hr (01/13/19 0904)       Medication Review: Seen as above    Assessment/Plan       Controlled type 2 diabetes mellitus without complication, without long-term current use of insulin (CMS/[facility])    Hypertension    Adrenal cortical adenoma    LORRAINE (obstructive sleep apnea)    Hypophosphatemia    Hypercalcemia    COPD (chronic obstructive pulmonary disease) (CMS/[facility])    Supplemental oxygen dependent    Acute renal failure superimposed on stage 3 chronic kidney disease (CMS/[facility])    1.  Hypercalcemia: Calcium 11.0 improved.  Chronic hypercalcemia, intact PTH is low 9 likely suppressed due to hypercalcemia.  She also has some anemia which will require further workup including SIEP UIP to rule out myeloma.  2.  Acute kidney failure: Could be secondary to hypercalcemia, volume depletion, use of diuretics.  3.  Anemia with low iron level of 10% patient will  require IV and oral iron.  4.  Hypo-phosphatemia: Accompanied with low intact PTH, replacement ordered.  Plan:   Change IV fluid normal saline plus potassium increased rate 100 mL per hour.  Avoid nephrotoxic medications.  Keep systolic blood pressure greater than 100.  Check volume status.  Check labs in the morning.   Adjust medication for the new GFR.  Case discussed with the medical staff taking care of the patient.  Check SIEP, vitamin D level          Vida Arrieta MD  01/13/19  12:55 PM

## 2019-01-14 LAB
ALBUMIN SERPL-MCNC: 3.55 G/DL (ref 3.2–4.8)
ALBUMIN/GLOB SERPL: 2 G/DL (ref 1.5–2.5)
ALP SERPL-CCNC: 66 U/L (ref 25–100)
ALT SERPL W P-5'-P-CCNC: 37 U/L (ref 7–40)
ANION GAP SERPL CALCULATED.3IONS-SCNC: 2 MMOL/L (ref 3–11)
AST SERPL-CCNC: 33 U/L (ref 0–33)
BASOPHILS # BLD AUTO: 0.01 10*3/MM3 (ref 0–0.2)
BASOPHILS NFR BLD AUTO: 0.2 % (ref 0–1)
BILIRUB SERPL-MCNC: 0.3 MG/DL (ref 0.3–1.2)
BUN BLD-MCNC: 41 MG/DL (ref 9–23)
BUN/CREAT SERPL: 20.4 (ref 7–25)
CALCIUM SPEC-SCNC: 10.6 MG/DL (ref 8.7–10.4)
CHLORIDE SERPL-SCNC: 98 MMOL/L (ref 99–109)
CO2 SERPL-SCNC: 35 MMOL/L (ref 20–31)
CREAT BLD-MCNC: 2.01 MG/DL (ref 0.6–1.3)
DEPRECATED RDW RBC AUTO: 59.9 FL (ref 37–54)
EOSINOPHIL # BLD AUTO: 0.14 10*3/MM3 (ref 0–0.3)
EOSINOPHIL NFR BLD AUTO: 2.8 % (ref 0–3)
ERYTHROCYTE [DISTWIDTH] IN BLOOD BY AUTOMATED COUNT: 15.8 % (ref 11.3–14.5)
FOLATE BLD-MCNC: 498.4 NG/ML
FOLATE RBC-MCNC: 2085 NG/ML
GFR SERPL CREATININE-BSD FRML MDRD: 25 ML/MIN/1.73
GLOBULIN UR ELPH-MCNC: 1.8 GM/DL
GLUCOSE BLD-MCNC: 94 MG/DL (ref 70–100)
GLUCOSE BLDC GLUCOMTR-MCNC: 112 MG/DL (ref 70–130)
GLUCOSE BLDC GLUCOMTR-MCNC: 137 MG/DL (ref 70–130)
GLUCOSE BLDC GLUCOMTR-MCNC: 143 MG/DL (ref 70–130)
GLUCOSE BLDC GLUCOMTR-MCNC: 98 MG/DL (ref 70–130)
HCT VFR BLD AUTO: 23.8 % (ref 34.5–44)
HCT VFR BLD AUTO: 23.9 % (ref 34–46.6)
HGB BLD-MCNC: 7.7 G/DL (ref 11.5–15.5)
IMM GRANULOCYTES # BLD AUTO: 0.01 10*3/MM3 (ref 0–0.03)
IMM GRANULOCYTES NFR BLD AUTO: 0.2 % (ref 0–0.6)
LYMPHOCYTES # BLD AUTO: 1.08 10*3/MM3 (ref 0.6–4.8)
LYMPHOCYTES NFR BLD AUTO: 21.8 % (ref 24–44)
MCH RBC QN AUTO: 33.6 PG (ref 27–31)
MCHC RBC AUTO-ENTMCNC: 32.4 G/DL (ref 32–36)
MCV RBC AUTO: 103.9 FL (ref 80–99)
MONOCYTES # BLD AUTO: 0.36 10*3/MM3 (ref 0–1)
MONOCYTES NFR BLD AUTO: 7.3 % (ref 0–12)
NEUTROPHILS # BLD AUTO: 3.36 10*3/MM3 (ref 1.5–8.3)
NEUTROPHILS NFR BLD AUTO: 67.9 % (ref 41–71)
PLATELET # BLD AUTO: 187 10*3/MM3 (ref 150–450)
PMV BLD AUTO: 8.8 FL (ref 6–12)
POTASSIUM BLD-SCNC: 3.6 MMOL/L (ref 3.5–5.5)
PROT SERPL-MCNC: 5.3 G/DL (ref 5.7–8.2)
RBC # BLD AUTO: 2.29 10*6/MM3 (ref 3.89–5.14)
SODIUM BLD-SCNC: 135 MMOL/L (ref 132–146)
WBC NRBC COR # BLD: 4.95 10*3/MM3 (ref 3.5–10.8)

## 2019-01-14 PROCEDURE — 94760 N-INVAS EAR/PLS OXIMETRY 1: CPT

## 2019-01-14 PROCEDURE — 94799 UNLISTED PULMONARY SVC/PX: CPT

## 2019-01-14 PROCEDURE — 85025 COMPLETE CBC W/AUTO DIFF WBC: CPT | Performed by: NURSE PRACTITIONER

## 2019-01-14 PROCEDURE — 94640 AIRWAY INHALATION TREATMENT: CPT

## 2019-01-14 PROCEDURE — 97166 OT EVAL MOD COMPLEX 45 MIN: CPT | Performed by: OCCUPATIONAL THERAPIST

## 2019-01-14 PROCEDURE — 80053 COMPREHEN METABOLIC PANEL: CPT | Performed by: NURSE PRACTITIONER

## 2019-01-14 PROCEDURE — 97535 SELF CARE MNGMENT TRAINING: CPT | Performed by: OCCUPATIONAL THERAPIST

## 2019-01-14 PROCEDURE — 82962 GLUCOSE BLOOD TEST: CPT

## 2019-01-14 PROCEDURE — 25810000003 SODIUM CHLORIDE 0.9 % WITH KCL 20 MEQ 20-0.9 MEQ/L-% SOLUTION: Performed by: INTERNAL MEDICINE

## 2019-01-14 RX ADMIN — METOPROLOL SUCCINATE 50 MG: 50 TABLET, EXTENDED RELEASE ORAL at 08:59

## 2019-01-14 RX ADMIN — CYANOCOBALAMIN TAB 1000 MCG 1000 MCG: 1000 TAB at 09:57

## 2019-01-14 RX ADMIN — IPRATROPIUM BROMIDE AND ALBUTEROL SULFATE 3 ML: 2.5; .5 SOLUTION RESPIRATORY (INHALATION) at 20:16

## 2019-01-14 RX ADMIN — APIXABAN 5 MG: 5 TABLET, FILM COATED ORAL at 19:55

## 2019-01-14 RX ADMIN — IPRATROPIUM BROMIDE AND ALBUTEROL SULFATE 3 ML: 2.5; .5 SOLUTION RESPIRATORY (INHALATION) at 17:17

## 2019-01-14 RX ADMIN — IPRATROPIUM BROMIDE AND ALBUTEROL SULFATE 3 ML: 2.5; .5 SOLUTION RESPIRATORY (INHALATION) at 12:51

## 2019-01-14 RX ADMIN — APIXABAN 5 MG: 5 TABLET, FILM COATED ORAL at 08:59

## 2019-01-14 RX ADMIN — LEVOTHYROXINE SODIUM 75 MCG: 75 TABLET ORAL at 03:37

## 2019-01-14 RX ADMIN — AMIODARONE HYDROCHLORIDE 200 MG: 200 TABLET ORAL at 08:59

## 2019-01-14 RX ADMIN — SODIUM CHLORIDE, PRESERVATIVE FREE 3 ML: 5 INJECTION INTRAVENOUS at 09:00

## 2019-01-14 RX ADMIN — ATORVASTATIN CALCIUM 40 MG: 40 TABLET, FILM COATED ORAL at 19:55

## 2019-01-14 RX ADMIN — POTASSIUM CHLORIDE AND SODIUM CHLORIDE 100 ML/HR: 900; 150 INJECTION, SOLUTION INTRAVENOUS at 15:40

## 2019-01-14 RX ADMIN — FLUTICASONE PROPIONATE 2 SPRAY: 50 SPRAY, METERED NASAL at 09:00

## 2019-01-14 NOTE — PROGRESS NOTES
"Discharge Planning Assessment  [facility]     Patient Name: Anny Santos  MRN: [id number]  Today's Date: 1/14/2019    Admit Date: 1/11/2019    Discharge Needs Assessment     Row Name 01/14/19 1420       Living Environment    Lives With  alone    Current Living Arrangements  home/apartment/condo    Primary Care Provided by  self    Provides Primary Care For  no one    Family Caregiver if Needed  none    Able to Return to Prior Arrangements  yes       Resource/Environmental Concerns    Transportation Concerns  car, none       Transition Planning    Patient/Family Anticipates Transition to  home    Patient/Family Anticipated Services at Transition      Transportation Anticipated  car, drives self       Discharge Needs Assessment    Readmission Within the Last 30 Days  no previous admission in last 30 days    Concerns to be Addressed  no discharge needs identified;denies needs/concerns at this time    Equipment Currently Used at Home  shower chair;oxygen;walker, rolling    Anticipated Changes Related to Illness  none    Equipment Needed After Discharge  none        Discharge Plan     Row Name 01/14/19 1421       Plan    Plan  HOME    Patient/Family in Agreement with Plan  yes    Plan Comments  Met with pt at bedside.  She resides alone in [facility].  She is independent with ADLs.  Uses a RW with ambulation and has home O2 per [facility].  No current HH.  Discussed HH services as rec per PT, however, pt declined relating that she has had it in the past and that they \"never got anywhere\".  Confirmed she has Medicare and supplemental insurance with Rx coverage.  Pt informed CM that she drove herself to the hospital and anticipates being able to drive herself home upon DC.  No immediate CM needs identified/voiced.  CM will cont to follow.    Final Discharge Disposition Code  01 - home or self-care        Destination      No service coordination in this encounter.      Durable Medical Equipment      No service " coordination in this encounter.      Dialysis/Infusion      No service coordination in this encounter.      Home Medical Care      No service coordination in this encounter.      Community Resources      No service coordination in this encounter.        Expected Discharge Date and Time     Expected Discharge Date Expected Discharge Time    Jan 16, 2019         Demographic Summary     Row Name 01/14/19 1419       General Information    Admission Type  inpatient    Referral Source  admission list    Reason for Consult  discharge planning    Preferred Language  English       Contact Information    Permission Granted to Share Info With      Contact Information Obtained for          Functional Status     Row Name 01/14/19 1420       Functional Status    Usual Activity Tolerance  moderate       Functional Status, IADL    Medications  independent    Meal Preparation  independent    Housekeeping  independent    Laundry  independent    Shopping  independent        Psychosocial    No documentation.       Abuse/Neglect    No documentation.       Legal    No documentation.       Substance Abuse    No documentation.       Patient Forms    No documentation.           Mary Toledo

## 2019-01-14 NOTE — PROGRESS NOTES
"   LOS: 1 day    Patient Care Team:  Mahendra Moreno MD as PCP - General (Family Medicine)    Chief Complaint:  Dizziness, blurred vision and tremors with hypercalcemia and acute renal failure  63-year-old morbidly obese  female admitted with abnormal lab hypercalcemia, acute renal failure.  Patient has known about hypercalcemia for many years followed by a primary care physician.  Sent to the hospital from the PCP for elevated calcium and creatinine.  She stated that recently her diuretics was changed.  And also her vitamin D was decreased from 10,000 units per week to 5000 units.  She also had adrenal adenoma which is slightly increased from 3.5-4.0        Subjective    Calcium improved, renal function slightly better.  No new events    Review of Systems:   Denies any denies nausea diarrhea and headache blurring of vision sore throat or neck pain.    Objective     Vital Sign Min/Max for last 24 hours  Temp  Min: 98 °F (36.7 °C)  Max: 98.3 °F (36.8 °C)   BP  Min: 97/56  Max: 135/66   Pulse  Min: 60  Max: 66   Resp  Min: 16  Max: 20   SpO2  Min: 94 %  Max: 99 %   No Data Recorded   No Data Recorded     Flowsheet Rows      First Filed Value   Admission Height  165.1 cm (65\") Documented at 01/11/2019 1848   Admission Weight  99.3 kg (219 lb) Documented at 01/11/2019 1848          I/O this shift:  In: 480 [P.O.:480]  Out: -   I/O last 3 completed shifts:  In: 1936 [P.O.:480; I.V.:1456]  Out: 1200 [Urine:1200]    Physical Exam:     General appearance: Well in bed.  No obvious distress alert oriented ×3.  HEENT   EOMI.  Oral mucosa moist.  Neck: Supple no JVD.  Lungs clear auscultation equal chest movement  Heart no gallop murmur or rub.  Abdomen: Mild mid obesity nontender positive bowel sounds  Extremities no edema cyanosis or clubbing.  Skin skin is warm and dry.  No rash noted.  Neuro: Grossly intact alert oriented ×3.       Results from last 7 days   Lab Units  01/14/19   0341  01/13/19   0555  01/12/19   " 0541  01/11/19   1926   WBC 10*3/mm3  4.95  4.84  6.74  10.60   HEMOGLOBIN g/dL  7.7*  7.9*  8.3*  9.2*   HEMATOCRIT %  23.8*  24.2*  25.8*  28.6*   PLATELETS 10*3/mm3  187  178  184  221   SODIUM mmol/L  135  135  134  133   POTASSIUM mmol/L  3.6  3.6  3.5  4.3   CHLORIDE mmol/L  98*  99  94*  89*   CO2 mmol/L  35.0*  36.0*  36.0*  36.0*   BUN mg/dL  41*  50*  57*  57*   CREATININE mg/dL  2.01*  2.33*  2.91*  3.10*   GLUCOSE mg/dL  94  91  89  147*   CALCIUM mg/dL  10.6*  11.0*  12.4*  13.8*   PHOSPHORUS mg/dL   --    --   1.8*  0.8*          Results Review:     I reviewed the patient's new clinical results.      amiodarone 200 mg Oral Q24H   apixaban 5 mg Oral Q12H   atorvastatin 40 mg Oral Nightly   famotidine 10 mg Oral Daily   fluticasone 2 spray Nasal Daily   insulin lispro 0-7 Units Subcutaneous 4x Daily With Meals & Nightly   ipratropium-albuterol 3 mL Nebulization 4x Daily - RT   levothyroxine 75 mcg Oral Daily   metoprolol succinate XL 50 mg Oral Q24H   sodium chloride 3 mL Intravenous Q12H   vitamin B-12 1,000 mcg Oral Daily       sodium chloride 0.9 % with KCl 20 mEq 125 mL/hr Last Rate: 125 mL/hr (01/13/19 1322)   sodium chloride 0.9 % with KCl 20 mEq 100 mL/hr Last Rate: 100 mL/hr (01/13/19 1321)       Medication Review: Seen as above    Assessment/Plan       Controlled type 2 diabetes mellitus without complication, without long-term current use of insulin (CMS/[facility])    Hypertension    Adrenal cortical adenoma    LORRAINE (obstructive sleep apnea)    Hypophosphatemia    Hypercalcemia    COPD (chronic obstructive pulmonary disease) (CMS/[facility])    Supplemental oxygen dependent    Acute renal failure superimposed on stage 3 chronic kidney disease (CMS/[facility])    1.  Hypercalcemia: Calcium 11.0 improved.  Chronic hypercalcemia, intact PTH is low 9 likely suppressed due to hypercalcemia. Vitamin D level 48. She also has some anemia which will require further workup including SIEP UIP to rule out myeloma.  2.  Acute  kidney failure: Could be secondary to hypercalcemia, volume depletion, use of diuretics.  3.  Anemia with low iron level of 10% patient will require IV and oral iron.  4.  Hypo-phosphatemia: Accompanied with low intact PTH, replacement ordered.  Plan:   Change IV fluid normal saline plus potassium increased rate 60 mL per hour.  Avoid nephrotoxic medications.  Keep systolic blood pressure greater than 100.  Check volume status.  Check labs in the morning.   Adjust medication for the new GFR.  Case discussed with the medical staff taking care of the patient.  Check Vida REDDY MD  01/14/19  2:05 PM

## 2019-01-14 NOTE — PLAN OF CARE
Problem: Patient Care Overview  Goal: Plan of Care Review  Outcome: Ongoing (interventions implemented as appropriate)   01/14/19 0187   Coping/Psychosocial   Plan of Care Reviewed With patient   OTHER   Outcome Summary Pt presents with decreased balance, decreased strength, decreased safety awareness. Pt lives alone, does not have assist from family/friends and reports h/o recent falls d/t knees buckling. Recommend SNF-level rehab, however she declines this and she declines HHOT. She would benefit from BSC, also declines this. OT issued AE to assist with ADLS as well as handouts on EC/WS.

## 2019-01-14 NOTE — THERAPY EVALUATION
Acute Care - Occupational Therapy Initial Evaluation  [facility]     Patient Name: Anny Santos  : 1955  MRN: [id number]  Today's Date: 2019  Onset of Illness/Injury or Date of Surgery: 19  Date of Referral to OT: 19  Referring Physician: TRESSA Mclain    Admit Date: 2019       ICD-10-CM ICD-9-CM   1. Hypophosphatemia E83.39 275.3   2. Hypercalcemia E83.52 275.42   3. Acute on chronic renal insufficiency N28.9 593.9    N18.9 585.9   4. Elevated blood pressure reading with diagnosis of hypertension I10 401.9   5. Impaired functional mobility, balance, gait, and endurance Z74.09 V49.89   6. Impaired mobility and ADLs Z74.09 799.89     Patient Active Problem List   Diagnosis   • Controlled type 2 diabetes mellitus without complication, without long-term current use of insulin (CMS/HCC)   • Steroid-induced diabetes mellitus (CMS/HCC)   • Vitamin D deficiency   • Hypertension   • Adrenal cortical adenoma   • Localized edema   • Abnormal findings on diagnostic imaging of lung   • Chronic bronchitis (CMS/HCC)   • Atherosclerosis   • LORRAINE (obstructive sleep apnea)   • Paroxysmal supraventricular tachycardia (CMS/HCC)   • Contrast media allergy   • Hypercalcemia   • Hypophosphatemia   • Hypercalcemia   • COPD (chronic obstructive pulmonary disease) (CMS/HCC)   • Supplemental oxygen dependent   • Acute renal failure superimposed on stage 3 chronic kidney disease (CMS/HCC)     Past Medical History:   Diagnosis Date   • Abnormal finding on lung imaging    • Adrenal cortical adenoma     A.  3.5 cm water density left adrenal mass consistent with adenoma noted on CT scan of   2013, unchanged in size from previous scans. Labs 2015 - normal cortisol and cats/mets   • Allergic rhinitis    • Anemia    • Arthritis    • Atherosclerosis    • Backache    • Cholelithiasis     2 cm densely calcified gallstone incidentally noted on CT scan of the chest 2013, asymptomatic.   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    • H/O transfusion of whole blood    • Hypertension    • Impaired fasting glucose    • Ingrown nail    • Iron deficiency    • Lower extremity edema    • Muscle spasm    • Myopia    • Nephrolithiasis    • Osteoarthritis    • Palpitations    • Paroxysmal supraventricular tachycardia (CMS/HCC)    • Sinus problem    • Skin lesion of left lower extremity    • Vitamin B12 deficiency      Past Surgical History:   Procedure Laterality Date   • ADENOIDECTOMY     • ANGIOPLASTY  2013   • APPENDECTOMY     • CARDIAC PACEMAKER PLACEMENT     • DILATATION AND CURETTAGE  1971   • EAR TUBES      Ear Pressure Equalization   • LASIK  2007   • OTHER SURGICAL HISTORY      Catheter Ablation Atrial supraventricular Tachycardia   • TONSILLECTOMY            OT ASSESSMENT FLOWSHEET (last 72 hours)      Occupational Therapy Evaluation     Row Name 01/14/19 0959                   OT Evaluation Time/Intention    Subjective Information  no complaints;fatigue  -AR        Document Type  evaluation  -AR        Mode of Treatment  occupational therapy  -AR        Patient Effort  adequate  -AR        Symptoms Noted During/After Treatment  none  -AR           General Information    Patient Profile Reviewed?  yes  -AR        Onset of Illness/Injury or Date of Surgery  01/12/19  -AR        Referring Physician  TRESSA Mclain  -AR        Patient Observations  alert;agree to therapy;cooperative  -AR        Patient/Family Observations  pt supine, no family at bedside  -AR        Prior Level of Function  independent:;all household mobility;community mobility;gait;transfer;ADL's;driving;home management;cooking;cleaning repost h/o recent falls  -AR        Equipment Currently Used at Home  oxygen;walker, rolling  -AR        Pertinent History of Current  Functional Problem  Pt is a 63 yof who presented [facility] with abnormal calcium and phosphorous levels.   -AR        Existing Precautions/Restrictions  fall;oxygen therapy device and L/min  -AR        Risks Reviewed  patient:;LOB;nausea/vomiting;dizziness;increased discomfort;change in vital signs;lines disloged  -AR        Benefits Reviewed  patient:;improve function;increase independence;increase strength;increase balance;decrease risk of DVT;increase knowledge  -AR        Barriers to Rehab  previous functional deficit  -AR           Relationship/Environment    Lives With  alone  -AR        Concerns About Impact on Relationships  Pt reports she does not have assist available  -AR           Resource/Environmental Concerns    Current Living Arrangements  home/apartment/condo  -AR        Resource/Environmental Concerns  home accessibility  -AR        Home Accessibility Concerns  bathroom not accessible  -AR           Cognitive Assessment/Interventions    Additional Documentation  Cognitive Assessment/Intervention (Group)  -AR           Cognitive Assessment/Intervention- PT/OT    Affect/Mental Status (Cognitive)  WNL  -AR        Orientation Status (Cognition)  oriented x 4  -AR        Follows Commands (Cognition)  WFL  -AR        Cognitive Function (Cognitive)  safety deficit  -AR        Safety Deficit (Cognitive)  judgment;problem solving;insight into deficits/self awareness;awareness of need for assistance  -AR        Personal Safety Interventions  fall prevention program maintained  -AR           Safety Issues, Functional Mobility    Impairments Affecting Function (Mobility)  balance;strength;endurance/activity tolerance  -AR           Bed Mobility Assessment/Treatment    Bed Mobility Assessment/Treatment  supine-sit;scooting/bridging  -AR        Scooting/Bridging [place] (Bed Mobility)  supervision  -AR        Supine-Sit [place] (Bed Mobility)  supervision  -AR        Assistive Device (Bed Mobility)  bed  rails;head of bed elevated  -AR           Transfer Assessment/Treatment    Transfer Assessment/Treatment  sit-stand transfer;stand-sit transfer;toilet transfer  -AR        Comment (Transfers)  Pt removed gait belt during toileting, declined use of AD while toileting  -AR           Sit-Stand Transfer    Sit-Stand [place] (Transfers)  contact guard;verbal cues  -AR           Stand-Sit Transfer    Stand-Sit [place] (Transfers)  contact guard;verbal cues  -AR           Toilet Transfer    Type (Toilet Transfer)  sit-stand;stand-sit  -AR        [place] Level (Toilet Transfer)  contact guard;verbal cues  -AR        Assistive Device (Toilet Transfer)  commode, bedside without drop arms  -AR           ADL Assessment/Intervention    66702 - OT Self Care/Mgmt Minutes  10  -AR        BADL Assessment/Intervention  bathing;lower body dressing;toileting  -AR           Bathing Assessment/Intervention    Bathing [place] Level  bathing skills;lower body;contact guard assist;verbal cues simulate  -AR        Bathing Position  edge of bed sitting  -AR        Comment (Bathing)  Pt able to simute, however reports dyspnea with ADLS at home. Issued LH sponge and educated pt on use  -AR           Lower Body Dressing Assessment/Training    Lower Body Dressing [place] Level  don;shoes/slippers;minimum assist (75% patient effort);verbal cues  -AR        Lower Body Dressing Position  supported sitting  -AR        Comment (Lower Body Dressing)  Pt reports intermittent difficulty with LB ADLs at home, and reports she does not wear socks. OT issued reacher, LH shoe horn per pt request and educated pt on use. Also issued handouts on home safety and EC/WS.   -AR           Toileting Assessment/Training    [place] Level (Toileting)  toileting skills;contact guard assist;verbal cues  -AR        Assistive Devices (Toileting)  commode, bedside without drop arms  -AR        Toileting Position  supported sitting;supported  standing  -AR        Comment (Toileting)  Pt would benefit from BSC for home use, she currently declines stating she cannot clean it after each use.   -AR           BADL Safety/Performance    Impairments, BADL Safety/Performance  balance;endurance/activity tolerance;shortness of breath;strength  -AR        Skilled BADL Treatment/Intervention  adaptive equipment training;BADL process/adaptation training;compensatory training;energy conservation  -AR        Progress in BADL Status  improvement noted  -AR           General ROM    GENERAL ROM COMMENTS  RUE WFL, L shoulder FE limited to 80 d/t chronic injury  -AR           MMT (Manual Muscle Testing)    General MMT Comments  RUE 4/5, LUE deferred  -AR           Motor Assessment/Interventions    Additional Documentation  Balance (Group);Balance Interventions (Group)  -AR           Balance    Balance  static sitting balance;static standing balance  -AR           Static Sitting Balance    Level of [place] (Unsupported Sitting, Static Balance)  independent  -AR        Sitting Position (Unsupported Sitting, Static Balance)  sitting on edge of bed  -AR        Time Able to Maintain Position (Unsupported Sitting, Static Balance)  more than 5 minutes  -AR           Static Standing Balance    Level of [place] (Supported Standing, Static Balance)  contact guard assist  -AR        Time Able to Maintain Position (Supported Standing, Static Balance)  1 to 2 minutes  -AR           Sensory Assessment/Intervention    Sensory General Assessment  no sensation deficits identified BUE  -AR           Positioning and Restraints    Pre-Treatment Position  in bed  -AR        Post Treatment Position  bed  -AR        In Bed  sitting EOB;call light within reach;encouraged to call for assist;exit alarm on  -AR           Pain Assessment    Additional Documentation  Pain Scale: Numbers Pre/Post-Treatment (Group)  -AR           Pain Scale: Numbers Pre/Post-Treatment    Pain Scale: Numbers,  Pretreatment  0/10 - no pain  -AR        Pain Scale: Numbers, Post-Treatment  0/10 - no pain  -AR           Plan of Care Review    Plan of Care Reviewed With  patient  -AR           Clinical Impression (OT)    Date of Referral to OT  01/13/19  -AR        OT Diagnosis  decreased independence with ADLs  -AR        Patient/Family Goals Statement (OT Eval)  return home  -AR        Criteria for Skilled Therapeutic Interventions Met (OT Eval)  yes;treatment indicated  -AR        Rehab Potential (OT Eval)  good, to achieve stated therapy goals  -AR        Therapy Frequency (OT Eval)  daily  -AR        Anticipated Equipment Needs at Discharge (OT)  bedside commode;tub bench pt curerntly declined BSC  -AR        Anticipated Discharge Disposition (OT)  skilled nursing facility would benefit from SNF rehab-declined, recommend HHOT-declin  -AR           Vital Signs    Pretreatment Heart Rate (beats/min)  60  -AR        Posttreatment Heart Rate (beats/min)  62  -AR        Pre SpO2 (%)  97  -AR        O2 Delivery Pre Treatment  supplemental O2  -AR        Post SpO2 (%)  97  -AR        O2 Delivery Post Treatment  supplemental O2  -AR        Pre Patient Position  Sitting  -AR        Intra Patient Position  Standing  -AR        Post Patient Position  Sitting  -AR           OT Goals    Transfer Goal Selection (OT)  transfer, OT goal 1  -AR        Dressing Goal Selection (OT)  dressing, OT goal 1  -AR        Safety Awareness Goal Selection (OT)  safety awareness, OT goal 1  -AR        Additional Documentation  Safety Awareness Goal Selection (OT) (Row)  -AR           Transfer Goal 1 (OT)    Activity/Assistive Device (Transfer Goal 1, OT)  sit-to-stand/stand-to-sit;toilet;commode, bedside without drop arms;walker, rolling  -AR        [place] Level/Cues Needed (Transfer Goal 1, OT)  supervision required;verbal cues required  -AR        Time Frame (Transfer Goal 1, OT)  short term goal (STG);5 days  -AR        Progress/Outcome  (Transfer Goal 1, OT)  goal ongoing  -AR           Dressing Goal 1 (OT)    Activity/Assistive Device (Dressing Goal 1, OT)  lower body dressing;reacher;long handled shoe horn AD PRN  -AR        [place]/Cues Needed (Dressing Goal 1, OT)  supervision required;verbal cues required  -AR        Time Frame (Dressing Goal 1, OT)  short term goal (STG);5 days  -AR        Progress/Outcome (Dressing Goal 1, OT)  goal ongoing  -AR           Safety Awareness Goal 1 (OT)    Activity (Safety Awareness Goal 1, OT)  demonstration of safe behaviors  -AR        [place]/Cues/Accuracy (Safety Awareness Goal 1, OT)  with minimum;verbal cues/redirection  -AR        Time Frame (Safety Awareness Goal 1, OT)  short term goal (STG);5 days  -AR        Progress/Outcome (Safety Awareness Goal 1, OT)  goal ongoing  -AR           Living Environment    Home Accessibility  tub/shower is not walk in  -AR          User Key  (r) = Recorded By, (t) = Taken By, (c) = Cosigned By    Initials Name Effective Dates    Carline Clifton, OT 06/22/15 -          Occupational Therapy Education     Title: PT OT SLP Therapies (In Progress)     Topic: Occupational Therapy (In Progress)     Point: ADL training (Done)     Description: Instruct learner(s) on proper safety adaptation and remediation techniques during self care or transfers.   Instruct in proper use of assistive devices.    Learning Progress Summary           Patient Acceptance, E,TB,D,H, VU,NR by AR at 1/14/2019  9:59 AM                   Point: Precautions (Done)     Description: Instruct learner(s) on prescribed precautions during self-care and functional transfers.    Learning Progress Summary           Patient Acceptance, E,TB,D,H, VU,NR by AR at 1/14/2019  9:59 AM                   Point: Body mechanics (Done)     Description: Instruct learner(s) on proper positioning and spine alignment during self-care, functional mobility activities and/or exercises.    Learning Progress Summary            Patient Acceptance, E,TB,D,H, VU,NR by AR at 1/14/2019  9:59 AM                               User Key     Initials Effective Dates Name Provider Type Discipline    AR 06/22/15 -  Carline Peck, OT Occupational Therapist OT                  OT Recommendation and Plan  Outcome Summary/Treatment Plan (OT)  Anticipated Equipment Needs at Discharge (OT): bedside commode, tub bench(pt curerntly declined BSC)  Anticipated Discharge Disposition (OT): skilled nursing facility(would benefit from SNF rehab-declined, recommend HHOT-declin)  Therapy Frequency (OT Eval): daily  Plan of Care Review  Plan of Care Reviewed With: patient  Plan of Care Reviewed With: patient  Outcome Summary: Pt presents with decreased balance, decreased strength, decreased safety awareness. Pt lives alone, does not have assist from family/friends and reports h/o recent falls d/t knees buckling. Recommend SNF-level rehab, however she declines this and she declines HHOT. She would benefit from BSC, also declines this. OT issued AE to assist with ADLS as well as handouts on EC/WS.     Outcome Measures     Row Name 01/14/19 0959 01/13/19 0950          How much help from another person do you currently need...    Turning from your back to your side while in flat bed without using bedrails?  --  4  -LF     Moving from lying on back to sitting on the side of a flat bed without bedrails?  --  4  -LF     Moving to and from a bed to a chair (including a wheelchair)?  --  3  -LF     Standing up from a chair using your arms (e.g., wheelchair, bedside chair)?  --  3  -LF     Climbing 3-5 steps with a railing?  --  3  -LF     To walk in hospital room?  --  3  -LF     AM-PAC 6 Clicks Score  --  20  -LF        How much help from another is currently needed...    Putting on and taking off regular lower body clothing?  3  -AR  --     Bathing (including washing, rinsing, and drying)  2  -AR  --     Toileting (which includes using toilet bed pan or  urinal)  3  -AR  --     Putting on and taking off regular upper body clothing  3  -AR  --     Taking care of personal grooming (such as brushing teeth)  3  -AR  --     Eating meals  3  -AR  --     Score  17  -AR  --        Functional Assessment    Outcome Measure Options  AM-PAC 6 Clicks Daily Activity (OT)  -AR  AM-PAC 6 Clicks Basic Mobility (PT)  -LF       User Key  (r) = Recorded By, (t) = Taken By, (c) = Cosigned By    Initials Name Provider Type    LF Ana Tsang, PT Physical Therapist    Carline Clifton, OT Occupational Therapist          Time Calculation:   Time Calculation- OT     Row Name 01/14/19 1354 01/14/19 0959          Time Calculation- OT    OT Start Time  --  0959  -AR     Total Timed Code Minutes- OT  10 minute(s)  -AR  --     OT Received On  --  01/14/19  -AR     OT Goal Re-Cert Due Date  --  01/24/19  -AR        Timed Charges    75336 - OT Self Care/Mgmt Minutes  --  10  -AR       User Key  (r) = Recorded By, (t) = Taken By, (c) = Cosigned By    Initials Name Provider Type    AR Carline Peck, OT Occupational Therapist        Therapy Suggested Charges     Code   Minutes Charges    99037 (CPT®) Hc Ot Neuromusc Re Education Ea 15 Min      51302 (CPT®) Hc Ot Ther Proc Ea 15 Min      48512 (CPT®) Hc Ot Therapeutic Act Ea 15 Min      29911 (CPT®) Hc Ot Manual Therapy Ea 15 Min      98962 (CPT®) Hc Ot Iontophoresis Ea 15 Min      79282 (CPT®) Hc Ot Elec Stim Ea-Per 15 Min      15512 (CPT®) Hc Ot Ultrasound Ea 15 Min      55824 (CPT®) Hc Ot Self Care/Mgmt/Train Ea 15 Min 10 1    Total  10 1        Therapy Charges for Today     Code Description Service Date Service Provider Modifiers Qty    56940577227 HC OT SELF CARE/MGMT/TRAIN EA 15 MIN 1/14/2019 Carline Peck OT GO 1    37094301741 HC OT EVAL MOD COMPLEXITY 4 1/14/2019 Carline Peck OT GO 1               Carline Peck OT  1/14/2019

## 2019-01-14 NOTE — PLAN OF CARE
Problem: Patient Care Overview  Goal: Plan of Care Review  Outcome: Ongoing (interventions implemented as appropriate)   01/14/19 0130   Coping/Psychosocial   Plan of Care Reviewed With patient   Plan of Care Review   Progress improving   OTHER   Outcome Summary Pt. VSS. No reports of pain or discomfort. Resting well. Will continue to monitor.      Goal: Individualization and Mutuality  Outcome: Ongoing (interventions implemented as appropriate)    Goal: Discharge Needs Assessment  Outcome: Ongoing (interventions implemented as appropriate)    Goal: Interprofessional Rounds/Family Conf  Outcome: Ongoing (interventions implemented as appropriate)      Problem: Fall Risk (Adult)  Goal: Absence of Fall  Outcome: Ongoing (interventions implemented as appropriate)

## 2019-01-14 NOTE — PROGRESS NOTES
[facility] Medicine Services  PROGRESS NOTE    Patient Name: Anny Santos  : 1955  MRN: [id number]    Date of Admission: 2019  Length of Stay: 1  Primary Care Physician: Mahendra Moreno MD    Subjective   Subjective     CC:  Electrolyte abnls    HPI:  Pt is seen resting up in bed in NAD. She feels good just tired. No acute events overnight per nursing. Denies any soa.     Review of Systems  Gen- No fevers, chills  CV- No chest pain, palpitations  Resp- No cough, dyspnea  GI- as above. No N/ V/D, abd pain      Otherwise ROS is negative except as mentioned in the HPI.    Objective   Objective     Vital Signs:   Temp:  [98 °F (36.7 °C)-98.3 °F (36.8 °C)] 98.1 °F (36.7 °C)  Heart Rate:  [60-70] 66  Resp:  [16-20] 16  BP: ()/(56-66) 110/61        Physical Exam:  Constitutional: No acute distress, awake, alert.  Sitting up in bed.  No visitors at bs.    HENT: NCAT, mucous membranes moist  Respiratory. Exp wheezes in upper lobes, but decreased at bases, respiratory effort normal.  Sats 96 % on 2LNC.   Cardiovascular: RRR, no murmurs, rubs, or gallops, palpable pedal pulses bilaterally  Gastrointestinal: Positive bowel sounds, soft, nontender, nondistended.  Obese   Musculoskeletal: trace bilateral ankle edema.  EVANGELISTA spont   Psychiatric: Appropriate affect, cooperative and calm   Neurologic: Oriented x 3, strength symmetric in all extremities, Cranial Nerves grossly intact to confrontation, speech clear and follows commands   Skin: No rashes        Results Reviewed:  I have personally reviewed current lab, radiology, and data and agree.    Results from last 7 days   Lab Units  19   0555  19   0541   WBC 10*3/mm3  4.95  4.84  6.74   HEMOGLOBIN g/dL  7.7*  7.9*  8.3*   HEMATOCRIT %  23.8*  24.2*  25.8*   PLATELETS 10*3/mm3  187  178  184     Results from last 7 days   Lab Units  19   0555  19   0541  19   1926    SODIUM mmol/L  135  135  134  133   POTASSIUM mmol/L  3.6  3.6  3.5  4.3   CHLORIDE mmol/L  98*  99  94*  89*   CO2 mmol/L  35.0*  36.0*  36.0*  36.0*   BUN mg/dL  41*  50*  57*  57*   CREATININE mg/dL  2.01*  2.33*  2.91*  3.10*   GLUCOSE mg/dL  94  91  89  147*   CALCIUM mg/dL  10.6*  11.0*  12.4*  13.8*   ALT (SGPT) U/L  37  25   --   22   AST (SGOT) U/L  33  23   --   20     Estimated Creatinine Clearance: 33 mL/min (A) (by C-G formula based on SCr of 2.01 mg/dL (H)).    No results found for: BNP    Microbiology Results Abnormal     None          Imaging Results (last 24 hours)     ** No results found for the last 24 hours. **               I have reviewed the medications:    Current Facility-Administered Medications:   •  amiodarone (PACERONE) tablet 200 mg, 200 mg, Oral, Q24H, Lesly Galindo MD, 200 mg at 01/14/19 0859  •  apixaban (ELIQUIS) tablet 5 mg, 5 mg, Oral, Q12H, Lesly Galindo MD, 5 mg at 01/14/19 0859  •  atorvastatin (LIPITOR) tablet 40 mg, 40 mg, Oral, Nightly, Lesly Galindo MD, 40 mg at 01/13/19 2029  •  dextrose (D50W) 25 g/ 50mL Intravenous Solution 25 g, 25 g, Intravenous, Q15 Min PRN, Surekha Lopez APRLUANA  •  dextrose (GLUTOSE) oral gel 15 g, 15 g, Oral, Q15 Min PRN, Surekha Lopez APRN  •  famotidine (PEPCID) tablet 10 mg, 10 mg, Oral, Daily, Nancy, Liliana, APRN, 10 mg at 01/13/19 2122  •  fluticasone (FLONASE) 50 MCG/ACT nasal spray 2 spray, 2 spray, Nasal, Daily, Nancy, Liliana, APRN, 2 spray at 01/14/19 0900  •  glucagon (human recombinant) (GLUCAGEN DIAGNOSTIC) injection 1 mg, 1 mg, Subcutaneous, PRN, Surekha Lopez APRN  •  insulin lispro (humaLOG) injection 0-7 Units, 0-7 Units, Subcutaneous, 4x Daily With Meals & Nightly, Surekha Lopez APRN  •  ipratropium-albuterol (DUO-NEB) nebulizer solution 3 mL, 3 mL, Nebulization, 4x Daily - RT, Liliana Cruz APRN, 3 mL at 01/13/19 2108  •  levothyroxine (SYNTHROID, LEVOTHROID) tablet 75 mcg, 75 mcg,  Oral, Daily, Lesly Galindo MD, 75 mcg at 01/14/19 0337  •  loperamide (IMODIUM) capsule 4 mg, 4 mg, Oral, 4x Daily PRN, Lesly Galindo MD, 4 mg at 01/12/19 1552  •  metoprolol succinate XL (TOPROL-XL) 24 hr tablet 50 mg, 50 mg, Oral, Q24H, Lesly Galindo MD, 50 mg at 01/14/19 0859  •  ondansetron (ZOFRAN) tablet 4 mg, 4 mg, Oral, Q6H PRN **OR** ondansetron (ZOFRAN) injection 4 mg, 4 mg, Intravenous, Q6H PRN, Surekha Lopez APRN  •  sodium chloride 0.9 % flush 3 mL, 3 mL, Intravenous, Q12H, Surekha Lopez APRN, 3 mL at 01/14/19 0900  •  sodium chloride 0.9 % flush 3-10 mL, 3-10 mL, Intravenous, PRN, Surekha Lopez APRN  •  sodium chloride 0.9 % with KCl 20 mEq/L infusion, 125 mL/hr, Intravenous, Continuous, Vida Arrieta MD, Last Rate: 125 mL/hr at 01/13/19 1322, 125 mL/hr at 01/13/19 1322  •  sodium chloride 0.9 % with KCl 20 mEq/L infusion, 100 mL/hr, Intravenous, Continuous, Vida Arrieta MD, Last Rate: 100 mL/hr at 01/13/19 1321, 100 mL/hr at 01/13/19 1321  •  sodium phosphates 21 mmol in Sodium chloride 0.9 % 250 mL IVPB, 21 mmol, Intravenous, PRN **OR** sodium phosphates 15 mmol in Sodium chloride 0.9 % 250 mL IVPB, 15 mmol, Intravenous, PRN, Last Rate: 62.5 mL/hr at 01/12/19 1551, 15 mmol at 01/12/19 1551 **OR** [DISCONTINUED] sodium phosphates 9 mmol in Sodium chloride 0.9 % 250 mL IVPB, 9 mmol, Intravenous, PRN, Roe Jones MD  •  vitamin B-12 (CYANOCOBALAMIN) tablet 1,000 mcg, 1,000 mcg, Oral, Daily, Kalina Wallace, APRN, 1,000 mcg at 01/14/19 0957      Assessment/Plan   Assessment / Plan     Active Hospital Problems    Diagnosis Date Noted   • Hypophosphatemia [E83.39] 01/11/2019   • Hypercalcemia [E83.52] 01/11/2019   • COPD (chronic obstructive pulmonary disease) (CMS/[facility]) [J44.9] 01/11/2019   • Supplemental oxygen dependent [Z99.81] 01/11/2019   • Acute renal failure superimposed on stage 3 chronic kidney disease (CMS/[facility]) [N17.9, N18.3]  01/11/2019   • LORRAINE (obstructive sleep apnea) [G47.33] 10/12/2016   • Adrenal cortical adenoma [D35.00] 06/24/2016     Description: A.  3.5 cm water density left adrenal mass consistent with adenoma noted on CT scan of 06/11/2013, unchanged in size from previous scans. Labs 5/2015 - normal cortisol and cats/mets     • Controlled type 2 diabetes mellitus without complication, without long-term current use of insulin (CMS/[facility]) [E11.9] 06/24/2016   • Hypertension [I10] 06/24/2016          Brief Hospital Course to date:  Anny Santos is a 63 year old female presents to the ED due to abnormal calcium level and phosphorus.        Hypercalcemia  ----improving with IVF, now down to 10.6; noted that patient has had issues with hypercalcemia in the past and follows with Dr. Vela of Endocrine-- extensive w/u in the past has been done  ---CXR done without concern for malignancy, TSH wnl, most recent PTH 9  ---nephrology consulted  ---could consider endocrine evaluation  --IVFs and meds per neph  --labs per neph pending  --am labs     Hypophosphatemia, improving  ---improved with replacement  ---etiology unclear  --nephrology following    TAYLOR on CKD III  ---improving, hold nephrotoxic agents  ---noted adrenal nodule--- urine studies ordered on admission and pending   --per nephrology   --am labs     T2DM  ---SSI, A1C 4.7  Glucoses stable.  No change for now. monitor    COPD  ---at home baseline O2, continue home inhalers     HTN  ---continue home meds       DVT Prophylaxis:  lovenox    Disposition: I expect the patient to be discharged pending further w/u and per nephrology recs    CODE STATUS:   Code Status and Medical Interventions:   Ordered at: 01/11/19 8818     Level Of Support Discussed With:    Patient     Code Status:    CPR     Medical Interventions (Level of Support Prior to Arrest):    Full         Electronically signed by TRESSA Griffin, 01/14/19, 10:55 AM.

## 2019-01-15 VITALS
RESPIRATION RATE: 16 BRPM | OXYGEN SATURATION: 93 % | SYSTOLIC BLOOD PRESSURE: 116 MMHG | DIASTOLIC BLOOD PRESSURE: 49 MMHG | BODY MASS INDEX: 37.36 KG/M2 | TEMPERATURE: 98.1 F | WEIGHT: 224.2 LBS | HEIGHT: 65 IN | HEART RATE: 60 BPM

## 2019-01-15 LAB
ANION GAP SERPL CALCULATED.3IONS-SCNC: 3 MMOL/L (ref 3–11)
BUN BLD-MCNC: 36 MG/DL (ref 9–23)
BUN/CREAT SERPL: 19.7 (ref 7–25)
CALCIUM SPEC-SCNC: 9.7 MG/DL (ref 8.7–10.4)
CHLORIDE SERPL-SCNC: 102 MMOL/L (ref 99–109)
CO2 SERPL-SCNC: 33 MMOL/L (ref 20–31)
CREAT BLD-MCNC: 1.83 MG/DL (ref 0.6–1.3)
GFR SERPL CREATININE-BSD FRML MDRD: 28 ML/MIN/1.73
GLUCOSE BLD-MCNC: 88 MG/DL (ref 70–100)
GLUCOSE BLDC GLUCOMTR-MCNC: 111 MG/DL (ref 70–130)
GLUCOSE BLDC GLUCOMTR-MCNC: 98 MG/DL (ref 70–130)
IGA SERPL-MCNC: 187 MG/DL (ref 87–352)
IGG SERPL-MCNC: 624 MG/DL (ref 700–1600)
IGM SERPL-MCNC: 35 MG/DL (ref 26–217)
POTASSIUM BLD-SCNC: 3.5 MMOL/L (ref 3.5–5.5)
PROT PATTERN SERPL IFE-IMP: ABNORMAL
SODIUM BLD-SCNC: 138 MMOL/L (ref 132–146)

## 2019-01-15 PROCEDURE — 94640 AIRWAY INHALATION TREATMENT: CPT

## 2019-01-15 PROCEDURE — 99239 HOSP IP/OBS DSCHRG MGMT >30: CPT | Performed by: NURSE PRACTITIONER

## 2019-01-15 PROCEDURE — 36415 COLL VENOUS BLD VENIPUNCTURE: CPT | Performed by: NURSE PRACTITIONER

## 2019-01-15 PROCEDURE — 94799 UNLISTED PULMONARY SVC/PX: CPT

## 2019-01-15 PROCEDURE — 82962 GLUCOSE BLOOD TEST: CPT

## 2019-01-15 PROCEDURE — 80048 BASIC METABOLIC PNL TOTAL CA: CPT | Performed by: NURSE PRACTITIONER

## 2019-01-15 PROCEDURE — 94760 N-INVAS EAR/PLS OXIMETRY 1: CPT

## 2019-01-15 RX ORDER — FUROSEMIDE 20 MG/1
20 TABLET ORAL DAILY
Qty: 180 TABLET | Refills: 1
Start: 2019-01-15 | End: 2019-08-01

## 2019-01-15 RX ORDER — POTASSIUM CHLORIDE 750 MG/1
20 TABLET, EXTENDED RELEASE ORAL DAILY
Refills: 1
Start: 2019-01-15 | End: 2019-08-01

## 2019-01-15 RX ADMIN — IPRATROPIUM BROMIDE AND ALBUTEROL SULFATE 3 ML: 2.5; .5 SOLUTION RESPIRATORY (INHALATION) at 13:06

## 2019-01-15 RX ADMIN — APIXABAN 5 MG: 5 TABLET, FILM COATED ORAL at 09:52

## 2019-01-15 RX ADMIN — FLUTICASONE PROPIONATE 2 SPRAY: 50 SPRAY, METERED NASAL at 09:52

## 2019-01-15 RX ADMIN — LEVOTHYROXINE SODIUM 75 MCG: 75 TABLET ORAL at 05:12

## 2019-01-15 RX ADMIN — CYANOCOBALAMIN TAB 1000 MCG 1000 MCG: 1000 TAB at 09:52

## 2019-01-15 RX ADMIN — SODIUM CHLORIDE, PRESERVATIVE FREE 3 ML: 5 INJECTION INTRAVENOUS at 09:53

## 2019-01-15 RX ADMIN — METOPROLOL SUCCINATE 50 MG: 50 TABLET, EXTENDED RELEASE ORAL at 09:52

## 2019-01-15 RX ADMIN — AMIODARONE HYDROCHLORIDE 200 MG: 200 TABLET ORAL at 09:52

## 2019-01-15 NOTE — PROGRESS NOTES
"   LOS: 2 days    Patient Care Team:  Mahendra Moreno MD as PCP - General (Family Medicine)    Chief Complaint:  Dizziness, blurred vision and tremors with hypercalcemia and acute renal failure  63-year-old morbidly obese  female admitted with abnormal lab hypercalcemia, acute renal failure.  Patient has known about hypercalcemia for many years followed by a primary care physician.  Sent to the hospital from the PCP for elevated calcium and creatinine.  She stated that recently her diuretics was changed.  And also her vitamin D was decreased from 10,000 units per week to 5000 units.  She also had adrenal adenoma which is slightly increased from 3.5-4.0    Subjective    Calcium improved, renal function slightly better.  No new events    Review of Systems:   Denies any nausea vomiting chest pain shortness of breath.  Objective     Vital Sign Min/Max for last 24 hours  Temp  Min: 98.1 °F (36.7 °C)  Max: 98.5 °F (36.9 °C)   BP  Min: 91/52  Max: 116/49   Pulse  Min: 59  Max: 67   Resp  Min: 16  Max: 18   SpO2  Min: 91 %  Max: 95 %   No Data Recorded   No Data Recorded     Flowsheet Rows      First Filed Value   Admission Height  165.1 cm (65\") Documented at 01/11/2019 1848   Admission Weight  99.3 kg (219 lb) Documented at 01/11/2019 1848        I/O this shift:  In: 480 [P.O.:480]  Out: 900 [Urine:900]  I/O last 3 completed shifts:  In: 960 [P.O.:960]  Out: 2700 [Urine:2700]    Physical Exam:    General appearance: Well in bed.  No obvious distress alert oriented ×3.  HEENT   EOMI.  Oral mucosa moist.  Neck: Supple no JVD.  Lungs clear auscultation equal chest movement  Heart no gallop murmur or rub.  Abdomen: Mild mid obesity nontender positive bowel sounds  Extremities no edema cyanosis or clubbing.  Skin skin is warm and dry.  No rash noted.  Neuro: Grossly intact alert oriented ×3.       Results from last 7 days   Lab Units  01/15/19   0558  01/14/19   0341  01/13/19   0555  01/12/19   0541  01/11/19   1926 "   WBC 10*3/mm3   --   4.95  4.84  6.74  10.60   HEMOGLOBIN g/dL   --   7.7*  7.9*  8.3*  9.2*   HEMATOCRIT %   --   23.8*  24.2*  25.8*  23.9*  28.6*   PLATELETS 10*3/mm3   --   187  178  184  221   SODIUM mmol/L  138  135  135  134  133   POTASSIUM mmol/L  3.5  3.6  3.6  3.5  4.3   CHLORIDE mmol/L  102  98*  99  94*  89*   CO2 mmol/L  33.0*  35.0*  36.0*  36.0*  36.0*   BUN mg/dL  36*  41*  50*  57*  57*   CREATININE mg/dL  1.83*  2.01*  2.33*  2.91*  3.10*   GLUCOSE mg/dL  88  94  91  89  147*   CALCIUM mg/dL  9.7  10.6*  11.0*  12.4*  13.8*   PHOSPHORUS mg/dL   --    --    --   1.8*  0.8*      Results Review:     I reviewed the patient's new clinical results.      amiodarone 200 mg Oral Q24H   apixaban 5 mg Oral Q12H   atorvastatin 40 mg Oral Nightly   famotidine 10 mg Oral Daily   fluticasone 2 spray Nasal Daily   insulin lispro 0-7 Units Subcutaneous 4x Daily With Meals & Nightly   ipratropium-albuterol 3 mL Nebulization 4x Daily - RT   levothyroxine 75 mcg Oral Daily   metoprolol succinate XL 50 mg Oral Q24H   sodium chloride 3 mL Intravenous Q12H   vitamin B-12 1,000 mcg Oral Daily       Medication Review: Seen as above    Assessment/Plan    1.  Hypercalcemia: Calcium 9.7 improved.  Chronic hypercalcemia, intact PTH is low 9 likely suppressed due to hypercalcemia. Vitamin D level 48. She also has some anemia which will require further workup including SIEP UIP to rule out myeloma.  2.  Acute kidney failure: Could be secondary to hypercalcemia, volume depletion, use of diuretics.  3.  Anemia with low iron level of 10% patient will require IV and oral iron.  4.  Hypo-phosphatemia: Accompanied with low intact PTH, replacement ordered.  Plan:  DC IV fluid normal saline plus potassium increased rate 60 mL per hour.   Adjust medication for the new GFR.  Case discussed with the medical staff taking care of the patient.  Check SIEP pending,  Okay to DC home.  Lasix 20 mg daily.  Return to clinic in 3-4 weeks in  [place] office  Vida Arrieta MD  01/15/19  1:32 PM

## 2019-01-15 NOTE — PLAN OF CARE
Problem: Patient Care Overview  Goal: Plan of Care Review  Outcome: Outcome(s) achieved Date Met: 01/15/19   01/15/19 5951   Coping/Psychosocial   Plan of Care Reviewed With patient   Plan of Care Review   Progress improving   OTHER   Outcome Summary VSS, denies pain, eager to go home.     Goal: Individualization and Mutuality  Outcome: Outcome(s) achieved Date Met: 01/15/19    Goal: Discharge Needs Assessment  Outcome: Outcome(s) achieved Date Met: 01/15/19    Goal: Interprofessional Rounds/Family Conf  Outcome: Outcome(s) achieved Date Met: 01/15/19      Problem: Fall Risk (Adult)  Goal: Absence of Fall  Outcome: Outcome(s) achieved Date Met: 01/15/19

## 2019-01-15 NOTE — PLAN OF CARE
Problem: Patient Care Overview  Goal: Plan of Care Review  Outcome: Ongoing (interventions implemented as appropriate)   01/15/19 0046   Coping/Psychosocial   Plan of Care Reviewed With patient   Plan of Care Review   Progress improving   OTHER   Outcome Summary Pt. VSS. No reports of pain or discomfort. Resting well. Will continue to monitor.      Goal: Individualization and Mutuality  Outcome: Ongoing (interventions implemented as appropriate)    Goal: Discharge Needs Assessment  Outcome: Ongoing (interventions implemented as appropriate)    Goal: Interprofessional Rounds/Family Conf  Outcome: Ongoing (interventions implemented as appropriate)      Problem: Fall Risk (Adult)  Goal: Absence of Fall  Outcome: Ongoing (interventions implemented as appropriate)

## 2019-01-15 NOTE — DISCHARGE SUMMARY
"    [facility] Medicine Services  DISCHARGE SUMMARY    Patient Name: Anny Santos  : 1955  MRN: [id number]    Date of Admission: 2019  Date of Discharge:  1/15/2019  Primary Care Physician: Mahendra Moreno MD    Consults     Date and Time Order Name Status Description    2019 0251 Inpatient Nephrology Consult            Hospital Course     Presenting Problem:   Hypophosphatemia [E83.39]  Hypophosphatemia [E83.39]    Active Hospital Problems    Diagnosis Date Noted   • Hypophosphatemia [E83.39] 2019   • Hypercalcemia [E83.52] 2019   • COPD (chronic obstructive pulmonary disease) (CMS/HCC) [J44.9] 2019   • Supplemental oxygen dependent [Z99.81] 2019   • Acute renal failure superimposed on stage 3 chronic kidney disease (CMS/HCC) [N17.9, N18.3] 2019   • LORRAINE (obstructive sleep apnea) [G47.33] 10/12/2016   • Adrenal cortical adenoma [D35.00] 2016   • Controlled type 2 diabetes mellitus without complication, without long-term current use of insulin (CMS/HCC) [E11.9] 2016   • Hypertension [I10] 2016      Resolved Hospital Problems   No resolved problems to display.          Hospital Course:  Anny Santos is a 63 y.o. female with a past medical history significant for COPD, iron deficiency anemia, atrial fibrillation, CKD stage III, LORRAINE, diabetes mellitus type 2: controlled, and essential hypertension who presents to the ED due to abnormal labs.  Patient states that several months ago she was started on sotalol for atrial fibrillation.  She states that the sotalol made her feel \"awful\" and her heart rate was dropping in the 30's therefore she was changed to amiodarone. Amiodarone made her thyroid levels abnormal and she had to start on levothyroxine.  Initially she was started on 25 mcg and then changed to 75 mcg.  Patient went to follow up with her PCP on Thursday since she has been consistently dealing with elevated calcium " levels.  She was called today and was notified she need to present to the ED for further workup of her elevated calcium levels.  She reports mild dizziness, blurry vision changes, and tremors at times.        Patient was admitted to [facility] under the care of the Hospitalist for further evaluation and treatment. Nephrology was consulted. Patient was given IVF's and calcium level improved and WNL at discharge. CXR without concern for malignancy, TSH wnl, most recent PTH 9 likely suppressed due to hypercalcemia. Nephrology sent SIEP IUP to rule out myeloma as the cause of her increased calcium and anemia. Test is still pending and will need to be followed up. Nephrology felt TAYLOR could be secondary to hypercalcemia, volume depletion and use of diuretics. Hypophosphatemia improved with replacement. Nephrology recommended to hold zaroxolyn at discharge and decrease dose of lasix to daily. potassium doses have been decreased and mag oxide stopped at discharge. Will have patient check bmp and mag level at pcp follow up visit and will defer to PCP on restarting medications if needed. Iron level  And iron saturation was low nephrology felt patient may require oral and IV iron. Patient is on oral replacement at home will defer to Nephrology on IV iron if needed.     Patient has remained clinically stable and will be discharged home today. She will need to follow up with PCP in one week with a BMP and Magnesium check. Follow up with Nephrology in 3-4 weeks at [facility]. Discussed in length all medication changes with patient.       Discharge Follow Up Recommendations for labs/diagnostics:  BMP and Mag level at PCP follow up     Day of Discharge     HPI:   Patient is sitting on side of bed in NAD. Tolerating diet. Up moving in room. Denies any soa. No acute events overnight per nursing. Plan for home today and patient is agreeable.     Review of Systems  Gen- No fevers, chills  CV- No chest pain, palpitations  Resp- No cough,  dyspnea  GI- No N/V/D, abd pain      Otherwise ROS is negative except as mentioned in the HPI.    Vital Signs:   Temp:  [98.1 °F (36.7 °C)-98.5 °F (36.9 °C)] 98.1 °F (36.7 °C)  Heart Rate:  [59-67] 60  Resp:  [16-18] 16  BP: ()/(49-52) 116/49     Physical Exam:  Constitutional: No acute distress, awake, alert.  Sitting up in bed.  No visitors at bs.    HENT: NCAT, mucous membranes moist  Respiratory decreased in donna lobes, but decreased at bases, respiratory effort normal.  Sats 96 % on 2LNC.   Cardiovascular: RRR, no murmurs, rubs, or gallops, palpable pedal pulses bilaterally  Gastrointestinal: Positive bowel sounds, soft, nontender, nondistended.  Obese   Musculoskeletal: trace bilateral ankle edema.  EVANGELISTA spont   Psychiatric: Appropriate affect, cooperative and calm   Neurologic: Oriented x 3, strength symmetric in all extremities, Cranial Nerves grossly intact to confrontation, speech clear and follows commands   Skin: No rashes          Pertinent  and/or Most Recent Results     Results from last 7 days   Lab Units  01/15/19   0558  01/14/19   0341  01/13/19   0555  01/12/19   0541  01/11/19   1926   WBC 10*3/mm3   --   4.95  4.84  6.74  10.60   HEMOGLOBIN g/dL   --   7.7*  7.9*  8.3*  9.2*   HEMATOCRIT %   --   23.8*  24.2*  25.8*  23.9*  28.6*   PLATELETS 10*3/mm3   --   187  178  184  221   SODIUM mmol/L  138  135  135  134  133   POTASSIUM mmol/L  3.5  3.6  3.6  3.5  4.3   CHLORIDE mmol/L  102  98*  99  94*  89*   CO2 mmol/L  33.0*  35.0*  36.0*  36.0*  36.0*   BUN mg/dL  36*  41*  50*  57*  57*   CREATININE mg/dL  1.83*  2.01*  2.33*  2.91*  3.10*   GLUCOSE mg/dL  88  94  91  89  147*   CALCIUM mg/dL  9.7  10.6*  11.0*  12.4*  13.8*     Results from last 7 days   Lab Units  01/14/19   0341  01/13/19   0555  01/11/19   1926   BILIRUBIN mg/dL  0.3  0.4  0.5   ALK PHOS U/L  66  68  79   ALT (SGPT) U/L  37  25  22   AST (SGOT) U/L  33  23  20           Invalid input(s): TG, LDLCALC, LDLREALC  Results  from last 7 days   Lab Units  01/12/19   0541   TSH mIU/mL  1.424   HEMOGLOBIN A1C %  4.70*       Brief Urine Lab Results  (Last result in the past 365 days)      Color   Clarity   Blood   Leuk Est   Nitrite   Protein   CREAT   Urine HCG        01/12/19 1345             52.0             Microbiology Results Abnormal     None          Imaging Results (all)     Procedure Component Value Units Date/Time    XR Chest PA & Lateral [[id number]] Collected:  01/12/19 0300     Updated:  01/12/19 0435    Narrative:       EXAM:    XR Chest, 2 Views     EXAM DATE/TIME:    1/12/2019 3:00 AM     CLINICAL HISTORY:    63 years old, female; Other disorders of phosphorus metabolism; Hypercalcemia;   Essential (primary) hypertension; Chronic kidney disease, unspecified; Disorder   of kidney and ureter, unspecified; Signs and symptoms; Shortness of breath;   Additional info: Possible lung mass     TECHNIQUE:    XR of the chest, 2 views.     COMPARISON:    CT CHEST WO CONTRAST 6/11/2013 8:36 AM     FINDINGS:    Tubes, catheters and devices:  A pacemaker device is present, and its leads   are in appropriate position.    Lungs: Unremarkable. No consolidation.    Pleural space: Unremarkable. No pleural effusion. No pneumothorax.    Heart/Mediastinum:  The heart demonstrates diffuse enlargement.    Bones/joints:  There are degenerative changes of the spine.       Impression:       No acute abnormality.    THIS DOCUMENT HAS BEEN ELECTRONICALLY SIGNED BY FORTUNATO SANTOS MD                          Order Current Status    Immunofixation, Serum In process    Immunofixation, Urine - Urine, Clean Catch In process        Discharge Details        Discharge Medications      Changes to Medications      Instructions Start Date   furosemide 20 MG tablet  Commonly known as:  LASIX  What changed:    · when to take this  · additional instructions   20 mg, Oral, Daily      levothyroxine 25 MCG tablet  Commonly known as:  SYNTHROID, LEVOTHROID  What changed:   how much to take   25 mcg, Oral, Daily      potassium chloride 10 MEQ CR tablet  Commonly known as:  K-DUR,KLOR-CON  What changed:    · how much to take  · how to take this  · when to take this  · additional instructions   20 mEq, Oral, Daily         Continue These Medications      Instructions Start Date   amiodarone 200 MG tablet  Commonly known as:  PACERONE   200 mg, Oral, Daily      atorvastatin 40 MG tablet  Commonly known as:  LIPITOR   40 mg, Oral, Nightly      CoQ-10 10 MG capsule   1 capsule, Oral, Daily      ELIQUIS 5 MG tablet tablet  Generic drug:  apixaban   5 mg, Oral, Every 12 Hours Scheduled      famotidine 20 MG tablet  Commonly known as:  PEPCID   20 mg, Oral, Nightly PRN      fluticasone 50 MCG/ACT nasal spray  Commonly known as:  FLONASE   2 sprays, Nasal, 2 Times Daily, Administer 2 sprays in each nostril for each dose.      ipratropium-albuterol 0.5-2.5 mg/3 ml nebulizer  Commonly known as:  DUO-NEB   1.5 mL, Nebulization, 2 Times Daily, Administer one 3 ML vial 4 times daily via Nebulization as needed for shortness of breath, wheezing      metoprolol succinate XL 50 MG 24 hr tablet  Commonly known as:  TOPROL-XL   50 mg, Oral, 2 Times Daily, Patient takes 25mg in the morning and 25mg in the evening.      VITAMIN B-12 PO   1,000 mcg, Oral, Daily         Stop These Medications    magnesium oxide 400 MG tablet  Commonly known as:  MAG-OX     metFORMIN 500 MG tablet  Commonly known as:  GLUCOPHAGE     metOLazone 5 MG tablet  Commonly known as:  ZAROXOLYN            Allergies   Allergen Reactions   • Fish-Derived Products    • Iodinated Diagnostic Agents Itching   • Minocin [Minocycline Hcl]    • Albuterol Palpitations   • Flexeril [Cyclobenzaprine] Palpitations   • Reglan [Metoclopramide] Palpitations         Discharge Disposition:  Home or Self Care    Discharge Diet:  Diet Order   Procedures   • Diet Regular; Cardiac, Consistent Carbohydrate         Discharge Activity:   Activity Instructions      Activity as Tolerated      Measure Blood Pressure      Measure Weight      Additional Activity Instructions:      Activity as tolerated.                    Special Instructions:    CODE STATUS:    Code Status and Medical Interventions:   Ordered at: 01/11/19 2743     Level Of Support Discussed With:    Patient     Code Status:    CPR     Medical Interventions (Level of Support Prior to Arrest):    Full         Future Appointments   Date Time Provider Department Center   2/19/2019  3:30 PM Alejandra Vela MD [facility] None       Additional Instructions for the Follow-ups that You Need to Schedule     Discharge Follow-up with PCP   As directed       Currently Documented PCP:    Mahendra Moreno MD    PCP Phone Number:    [phone]     Follow Up Details:  pcp one week with bmp and mag level check               Time Spent on Discharge:  35  minutes    Electronically signed by TRESSA Griffin, 01/15/19, 2:09 PM.

## 2019-01-16 ENCOUNTER — READMISSION MANAGEMENT (OUTPATIENT)
Dept: CALL CENTER | Facility: HOSPITAL | Age: 64
End: 2019-01-16

## 2019-01-16 LAB — INTERPRETATION UR IFE-IMP: NORMAL

## 2019-01-16 NOTE — OUTREACH NOTE
Prep Survey      Responses   Facility patient discharged from?  [facility]   Is patient eligible?  Yes   Discharge diagnosis  Hypophosphatemia, hypercalcemia, COPD, supplemental oxygen dependent, ARF superimposed onf CKD III, LORRAINE , adrenal cortical adenoma, Controlled NIDDM II without complication, HTN,    Does the patient have one of the following disease processes/diagnoses(primary or secondary)?  Other   Does the patient have Home health ordered?  No   Is there a DME ordered?  No   What DME was ordered?  Pt has home O2 and rolling walker per [facility].   Comments regarding appointments  See AVS   Prep survey completed?  Yes          Perlita Dias RN

## 2019-01-17 ENCOUNTER — READMISSION MANAGEMENT (OUTPATIENT)
Dept: CALL CENTER | Facility: HOSPITAL | Age: 64
End: 2019-01-17

## 2019-01-17 NOTE — OUTREACH NOTE
Medical Week 1 Survey      Responses   Facility patient discharged from?  [facility]   Does the patient have one of the following disease processes/diagnoses(primary or secondary)?  Other   Is there a successful TCM telephone encounter documented?  No   Week 1 attempt successful?  No   Unsuccessful attempts  Attempt 1          Davida Brand RN

## 2019-01-18 ENCOUNTER — READMISSION MANAGEMENT (OUTPATIENT)
Dept: CALL CENTER | Facility: HOSPITAL | Age: 64
End: 2019-01-18

## 2019-01-18 NOTE — OUTREACH NOTE
Medical Week 1 Survey      Responses   Facility patient discharged from?  [facility]   Does the patient have one of the following disease processes/diagnoses(primary or secondary)?  Other   Is there a successful TCM telephone encounter documented?  No   Week 1 attempt successful?  Yes   Rescheduled  Rescheduled-pt requested [Relative is in [place].]          Mahendra Bonilla RN

## 2019-01-19 ENCOUNTER — READMISSION MANAGEMENT (OUTPATIENT)
Dept: CALL CENTER | Facility: HOSPITAL | Age: 64
End: 2019-01-19

## 2019-01-20 NOTE — OUTREACH NOTE
Medical Week 1 Survey      Responses   Facility patient discharged from?  [facility]   Does the patient have one of the following disease processes/diagnoses(primary or secondary)?  Other   Is there a successful TCM telephone encounter documented?  No   Week 1 attempt successful?  Yes   Call start time  1750   Call end time  1817   Meds reviewed with patient/caregiver?  Yes   Is the patient having any side effects they believe may be caused by any medication additions or changes?  No   Does the patient have all medications ordered at discharge?  Yes   Is the patient taking all medications as directed (includes completed medication regime)?  Yes   Comments regarding appointments  appt with Dr Moreno on 01/24/19, but will be calling Monday morning for earlier appt.   Does the patient have a primary care provider?   Yes   Does the patient have an appointment with their PCP within 7 days of discharge?  Greater than 7 days   Comments regarding PCP  Dr Moreno   What is preventing the patient from scheduling follow up appointments within 7 days of discharge?  Earlier appointment not available   Has the patient kept scheduled appointments due by today?  N/A   Has home health visited the patient within 72 hours of discharge?  N/A   DME comments  Has home oxygen and rolling walker.   Psychosocial issues?  No   Psychosocial comments  Lives alone. States has some neighbors who offer to help.   Did the patient receive a copy of their discharge instructions?  Yes   Nursing interventions  Reviewed instructions with patient   What is the patient's perception of their health status since discharge?  Same   Is the patient/caregiver able to teach back signs and symptoms related to disease process for when to call PCP?  Yes   Is the patient/caregiver able to teach back signs and symptoms related to disease process for when to call 911?  Yes   Is the patient/caregiver able to teach back the hierarchy of who to call/visit for  symptoms/problems? PCP, Specialist, Home health nurse, Urgent Care, ED, 911  Yes   Additional teach back comments  Encouraged to weigh daily-states aware of parameters. States was not weighed in hospital.   Week 1 call completed?  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   Wrap up additional comments  States is feeling about the same-states increased edema in lower legs/feet due to 30# weight gain while in hospital. States also has swollen left jaw which tried to go to urgent care but did not want to wait for 4 hours to be seen. Instructed to notify PCP Monday morning if unable to get to urgent care due to weather. Patient is a nurse. Encouraged to weigh daily.          Dianne Hutton RN

## 2019-02-19 ENCOUNTER — OFFICE VISIT (OUTPATIENT)
Dept: ENDOCRINOLOGY | Facility: CLINIC | Age: 64
End: 2019-02-19

## 2019-02-19 VITALS
HEIGHT: 65 IN | WEIGHT: 234 LBS | SYSTOLIC BLOOD PRESSURE: 110 MMHG | OXYGEN SATURATION: 96 % | HEART RATE: 63 BPM | BODY MASS INDEX: 38.99 KG/M2 | DIASTOLIC BLOOD PRESSURE: 62 MMHG

## 2019-02-19 DIAGNOSIS — E09.9 STEROID-INDUCED DIABETES MELLITUS (HCC): ICD-10-CM

## 2019-02-19 DIAGNOSIS — E83.52 HYPERCALCEMIA: ICD-10-CM

## 2019-02-19 DIAGNOSIS — D35.00 ADRENAL CORTICAL ADENOMA, UNSPECIFIED LATERALITY: ICD-10-CM

## 2019-02-19 DIAGNOSIS — T38.0X5A STEROID-INDUCED DIABETES MELLITUS (HCC): ICD-10-CM

## 2019-02-19 DIAGNOSIS — E11.9 CONTROLLED TYPE 2 DIABETES MELLITUS WITHOUT COMPLICATION, WITHOUT LONG-TERM CURRENT USE OF INSULIN (HCC): Primary | ICD-10-CM

## 2019-02-19 DIAGNOSIS — E03.9 HYPOTHYROIDISM, ADULT: ICD-10-CM

## 2019-02-19 LAB
GLUCOSE BLDC GLUCOMTR-MCNC: 144 MG/DL (ref 70–130)
HBA1C MFR BLD: 4.9 %

## 2019-02-19 PROCEDURE — 82947 ASSAY GLUCOSE BLOOD QUANT: CPT | Performed by: INTERNAL MEDICINE

## 2019-02-19 PROCEDURE — 99214 OFFICE O/P EST MOD 30 MIN: CPT | Performed by: INTERNAL MEDICINE

## 2019-02-19 PROCEDURE — 83036 HEMOGLOBIN GLYCOSYLATED A1C: CPT | Performed by: INTERNAL MEDICINE

## 2019-02-19 RX ORDER — POTASSIUM CHLORIDE 750 MG/1
TABLET, FILM COATED, EXTENDED RELEASE ORAL
COMMUNITY
Start: 2019-02-13 | End: 2019-07-24 | Stop reason: SDUPTHER

## 2019-02-19 NOTE — PROGRESS NOTES
Chief complaint  Diabetes (Follow UP  Was recently in hospital.  They took her off all Diabetes RX  ) and Hypothyroidism    Subjective   Anny Santos is a 63 y.o. female is here today for follow-up.  Follow-up for adrenal tumor. Patient has adrenal incidentaloma found on CT scan ordered for pulmonary nodules on dec 2012. Repeat CT scan  showed unchanged 3.5 cm adrenal adenoma with benign radiologic characteristics - low density.   CT adrenal 10/21/2016 - Stable 3.7 cm low-density left adrenal nodule. Hounsfield units less than 10.   2018 CT adrenals 4 cm adrenal adenoma with low Hounsfield units.       Patient has multiple medical problems, including hypertension, hyperlipidemia, asthma with COPD, SVT, impaired glucose tolerance, uncontrolled hypertension and worsening edema. She was admitted to the hospital with severe hypercalcemia and renal insufficiency, now followed with nephrologist.     Diabetes mellitus type 2 Her diabetes is well controlled.     Hypothyroidism - started levothyroxine in 10/2018.   During hospitalization in 1/2019 her dose was increased to 75 mcg, then reduced back down to 25 mcg. Thyroid levels were high and she had SOB. Last TSH 1/12/2019 showed normal TSH of 1.4.    Hypercalcemia with the calcium level of 13 at the time of presenting to the [facility]. She was given IVF. Renal function insufficiency - with creatinine of 2.2. Overall the picture of secondary hyperparathyroidism was seen. Last labs showed normal calcium of 10.1 and borderline PTH of 90.1.     Medications    Current Outpatient Medications:   •  amiodarone (PACERONE) 200 MG tablet, Take 200 mg by mouth Daily., Disp: , Rfl:   •  atorvastatin (LIPITOR) 40 MG tablet, Take 40 mg by mouth Every Night., Disp: , Rfl:   •  Cyanocobalamin (VITAMIN B-12 PO), Take 1,000 mcg by mouth Daily., Disp: , Rfl:   •  ELIQUIS 5 MG tablet tablet, Take 5 mg by mouth Every 12 (Twelve) Hours., Disp: , Rfl:   •  fluticasone (FLONASE) 50  "MCG/ACT nasal spray, 2 sprays into the nostril(s) as directed by provider 2 (Two) Times a Day. Administer 2 sprays in each nostril for each dose. , Disp: , Rfl:   •  furosemide (LASIX) 20 MG tablet, Take 1 tablet by mouth Daily., Disp: 180 tablet, Rfl: 1  •  ipratropium-albuterol (DUO-NEB) 0.5-2.5 mg/mL nebulizer, Take 1.5 mL by nebulization 2 (Two) Times a Day. Administer one 3 ML vial 4 times daily via Nebulization as needed for shortness of breath, wheezing, Disp: , Rfl:   •  levothyroxine (SYNTHROID, LEVOTHROID) 25 MCG tablet, Take 1 tablet by mouth Daily. (Patient taking differently: Take 75 mcg by mouth Daily.), Disp: 30 tablet, Rfl: 5  •  metoprolol succinate XL (TOPROL-XL) 50 MG 24 hr tablet, Take 50 mg by mouth 2 (Two) Times a Day. Patient takes 25mg in the morning and 25mg in the evening., Disp: , Rfl:   •  potassium chloride (K-DUR,KLOR-CON) 10 MEQ CR tablet, Take 2 tablets by mouth Daily., Disp: , Rfl: 1  •  potassium chloride (K-DUR) 10 MEQ CR tablet, , Disp: , Rfl:     PMH  The following portions of the patient's history were reviewed and updated as appropriate: allergies, current medications, past family history, past medical history, past social history, past surgical history and problem list.    Review of systems  Review of Systems   Constitutional: Positive for fatigue.   Respiratory: Positive for shortness of breath. Cough: after URI.    Cardiovascular: Positive for palpitations and leg swelling.   Gastrointestinal: Positive for abdominal pain and constipation.   Musculoskeletal: Positive for gait problem (ambulates with walker).   Neurological: Positive for weakness.       Physical exam  Objective   Blood pressure 110/62, pulse 63, height 165.1 cm (65\"), weight 106 kg (234 lb), SpO2 96 %.   Physical Exam   Constitutional: She is oriented to person, place, and time. She appears well-developed and well-nourished.   HENT:   Head: Normocephalic and atraumatic.   Eyes: Conjunctivae are normal.   Neck: " No thyromegaly present.   The neck is supple and free of adenopathy or masses, the thyroid is normal without enlargement or nodules.   Cardiovascular: Normal rate, regular rhythm and normal heart sounds.   Pulmonary/Chest: Effort normal.   Decreased air entry bilaterally   Musculoskeletal: She exhibits edema (trace).   Lymphadenopathy:     She has no cervical adenopathy.   Neurological: She is alert and oriented to person, place, and time.   Skin: Skin is warm and dry. No rash noted.   Psychiatric: She has a normal mood and affect. Thought content normal.   Vitals reviewed.        LABS AND IMAGING    Results for orders placed or performed in visit on 02/19/19   POC Glucose Fingerstick   Result Value Ref Range    Glucose 144 (A) 70 - 130 mg/dL   POC Glycosylated Hemoglobin (Hb A1C)   Result Value Ref Range    Hemoglobin A1C 4.9 %      CT adrenal showed 4 cm left adrenal nodule.   Assessment  Assessment/Plan   1. Controlled type 2 diabetes mellitus without complication, without long-term current use of insulin (CMS/HCC)    2. Steroid-induced diabetes mellitus (CMS/HCC)    3. Adrenal cortical adenoma, unspecified laterality    4. Hypercalcemia    5. Hypothyroidism, adult      Orders Placed This Encounter   Procedures   • TSH   • T4, Free   • POC Glucose Fingerstick   • POC Glycosylated Hemoglobin (Hb A1C)     Plan  Hypercalcemia - last calcium level stabilized with elevated PTH. I have performed bedside u/s and was not able to localize parathyroid adenoma.   -cont current meds.      Diabetes mellitus type 2 is well controlled on diet  -metformin stopped due to the renal failure.     Adrenal left nodule -Reviewed images and report of CT. Adrenal nodule is low density, but slowly growing - 4 cm now. Biochemical evaluation was negative for secreting adenoma.     Acute on CKD>  She has follow-up with the nephrologist next week.   -will check labs at that time    Inhaler samples and eliquis provided.     Follow-up in 3  months.

## 2019-02-20 ENCOUNTER — TELEPHONE (OUTPATIENT)
Dept: INTERNAL MEDICINE | Facility: CLINIC | Age: 64
End: 2019-02-20

## 2019-03-05 ENCOUNTER — TELEPHONE (OUTPATIENT)
Dept: INTERNAL MEDICINE | Facility: CLINIC | Age: 64
End: 2019-03-05

## 2019-03-07 RX ORDER — LEVOTHYROXINE SODIUM 0.05 MG/1
50 TABLET ORAL DAILY
Qty: 30 TABLET | Refills: 11 | Status: SHIPPED | OUTPATIENT
Start: 2019-03-07 | End: 2019-06-29

## 2019-04-30 ENCOUNTER — TRANSCRIBE ORDERS (OUTPATIENT)
Dept: ADMINISTRATIVE | Facility: HOSPITAL | Age: 64
End: 2019-04-30

## 2019-04-30 DIAGNOSIS — Z12.31 VISIT FOR SCREENING MAMMOGRAM: Primary | ICD-10-CM

## 2019-05-01 ENCOUNTER — HOSPITAL ENCOUNTER (OUTPATIENT)
Dept: MAMMOGRAPHY | Facility: HOSPITAL | Age: 64
Discharge: HOME | End: 2019-05-01
Admitting: FAMILY MEDICINE

## 2019-05-01 DIAGNOSIS — Z12.31 VISIT FOR SCREENING MAMMOGRAM: ICD-10-CM

## 2019-05-01 PROCEDURE — 77067 SCR MAMMO BI INCL CAD: CPT

## 2019-05-01 PROCEDURE — 77063 BREAST TOMOSYNTHESIS BI: CPT | Performed by: RADIOLOGY

## 2019-05-01 PROCEDURE — 77067 SCR MAMMO BI INCL CAD: CPT | Performed by: RADIOLOGY

## 2019-05-01 PROCEDURE — 77063 BREAST TOMOSYNTHESIS BI: CPT

## 2019-05-15 ENCOUNTER — TRANSCRIBE ORDERS (OUTPATIENT)
Dept: CARDIAC REHAB | Facility: HOSPITAL | Age: 64
End: 2019-05-15

## 2019-05-15 ENCOUNTER — OFFICE VISIT (OUTPATIENT)
Dept: CARDIAC REHAB | Facility: HOSPITAL | Age: 64
End: 2019-05-15

## 2019-05-15 DIAGNOSIS — E78.2 MIXED HYPERLIPIDEMIA: Primary | ICD-10-CM

## 2019-05-15 DIAGNOSIS — I48.20 CHRONIC ATRIAL FIBRILLATION (HCC): Primary | ICD-10-CM

## 2019-05-15 DIAGNOSIS — I48.20 CHRONIC ATRIAL FIBRILLATION (HCC): ICD-10-CM

## 2019-05-15 DIAGNOSIS — I10 HYPERTENSION, UNSPECIFIED TYPE: ICD-10-CM

## 2019-05-15 NOTE — PROGRESS NOTES
Order received for Phase II Cardiac Rehab. Staff discussed benefits of exercise, program protocol with pt. Teach back verified. Pt does not have a qualifying dx for Phase 2 CR and states can not afford Phase 3 out of pocket.  Pt would qualify for Pulmonary Rehab, and pt was advised of this.  Information given to pt for local Pulmonary rehab facilities and she states intent to speak with MD cooper: this.

## 2019-05-31 ENCOUNTER — TRANSCRIBE ORDERS (OUTPATIENT)
Dept: LAB | Facility: HOSPITAL | Age: 64
End: 2019-05-31

## 2019-05-31 ENCOUNTER — HOSPITAL ENCOUNTER (OUTPATIENT)
Dept: GENERAL RADIOLOGY | Facility: HOSPITAL | Age: 64
Discharge: HOME | End: 2019-05-31
Admitting: INTERNAL MEDICINE

## 2019-05-31 ENCOUNTER — LAB (OUTPATIENT)
Dept: LAB | Facility: HOSPITAL | Age: 64
End: 2019-05-31

## 2019-05-31 DIAGNOSIS — Z79.899 LONG-TERM USE OF HIGH-RISK MEDICATION: ICD-10-CM

## 2019-05-31 DIAGNOSIS — Z79.899 LONG-TERM USE OF HIGH-RISK MEDICATION: Primary | ICD-10-CM

## 2019-05-31 LAB
ALBUMIN SERPL-MCNC: 4 G/DL (ref 3.5–5)
ALBUMIN/GLOB SERPL: 1.3 G/DL (ref 1–2)
ALP SERPL-CCNC: 111 U/L (ref 38–126)
ALT SERPL W P-5'-P-CCNC: 34 U/L (ref 13–69)
ANION GAP SERPL CALCULATED.3IONS-SCNC: 10.7 MMOL/L (ref 10–20)
AST SERPL-CCNC: 25 U/L (ref 15–46)
BILIRUB SERPL-MCNC: 0.7 MG/DL (ref 0.2–1.3)
BUN BLD-MCNC: 22 MG/DL (ref 7–20)
BUN/CREAT SERPL: 15.7 (ref 7.1–23.5)
CALCIUM SPEC-SCNC: 9.8 MG/DL (ref 8.4–10.2)
CHLORIDE SERPL-SCNC: 92 MMOL/L (ref 98–107)
CO2 SERPL-SCNC: 39 MMOL/L (ref 26–30)
CREAT BLD-MCNC: 1.4 MG/DL (ref 0.6–1.3)
GFR SERPL CREATININE-BSD FRML MDRD: 38 ML/MIN/1.73
GLOBULIN UR ELPH-MCNC: 3 GM/DL
GLUCOSE BLD-MCNC: 142 MG/DL (ref 74–98)
POTASSIUM BLD-SCNC: 3.7 MMOL/L (ref 3.5–5.1)
PROT SERPL-MCNC: 7 G/DL (ref 6.3–8.2)
SODIUM BLD-SCNC: 138 MMOL/L (ref 137–145)
TSH SERPL DL<=0.05 MIU/L-ACNC: 5.28 MIU/ML (ref 0.47–4.68)

## 2019-05-31 PROCEDURE — 80053 COMPREHEN METABOLIC PANEL: CPT

## 2019-05-31 PROCEDURE — 84443 ASSAY THYROID STIM HORMONE: CPT

## 2019-05-31 PROCEDURE — 71046 X-RAY EXAM CHEST 2 VIEWS: CPT

## 2019-05-31 PROCEDURE — 36415 COLL VENOUS BLD VENIPUNCTURE: CPT

## 2019-06-29 RX ORDER — LEVOTHYROXINE SODIUM 0.07 MG/1
75 TABLET ORAL DAILY
Qty: 30 TABLET | Refills: 11 | Status: SHIPPED | OUTPATIENT
Start: 2019-06-29 | End: 2019-12-02

## 2019-07-24 ENCOUNTER — OFFICE VISIT (OUTPATIENT)
Dept: ENDOCRINOLOGY | Facility: CLINIC | Age: 64
End: 2019-07-24

## 2019-07-24 VITALS
WEIGHT: 244.2 LBS | SYSTOLIC BLOOD PRESSURE: 120 MMHG | DIASTOLIC BLOOD PRESSURE: 70 MMHG | HEART RATE: 69 BPM | HEIGHT: 65 IN | OXYGEN SATURATION: 94 % | BODY MASS INDEX: 40.69 KG/M2

## 2019-07-24 DIAGNOSIS — E09.9 STEROID-INDUCED DIABETES MELLITUS (HCC): ICD-10-CM

## 2019-07-24 DIAGNOSIS — N18.3 ACUTE RENAL FAILURE SUPERIMPOSED ON STAGE 3 CHRONIC KIDNEY DISEASE, UNSPECIFIED ACUTE RENAL FAILURE TYPE: ICD-10-CM

## 2019-07-24 DIAGNOSIS — E55.9 VITAMIN D DEFICIENCY: ICD-10-CM

## 2019-07-24 DIAGNOSIS — E83.52 HYPERCALCEMIA: ICD-10-CM

## 2019-07-24 DIAGNOSIS — E11.9 CONTROLLED TYPE 2 DIABETES MELLITUS WITHOUT COMPLICATION, WITHOUT LONG-TERM CURRENT USE OF INSULIN (HCC): Primary | ICD-10-CM

## 2019-07-24 DIAGNOSIS — T38.0X5A STEROID-INDUCED DIABETES MELLITUS (HCC): ICD-10-CM

## 2019-07-24 DIAGNOSIS — D35.00 ADRENAL CORTICAL ADENOMA, UNSPECIFIED LATERALITY: ICD-10-CM

## 2019-07-24 DIAGNOSIS — E03.9 HYPOTHYROIDISM, ADULT: ICD-10-CM

## 2019-07-24 DIAGNOSIS — N17.9 ACUTE RENAL FAILURE SUPERIMPOSED ON STAGE 3 CHRONIC KIDNEY DISEASE, UNSPECIFIED ACUTE RENAL FAILURE TYPE: ICD-10-CM

## 2019-07-24 LAB
25(OH)D3 SERPL-MCNC: 27.1 NG/ML (ref 30–100)
ALBUMIN SERPL-MCNC: 4.2 G/DL (ref 3.5–5.2)
ALBUMIN/GLOB SERPL: 1.5 G/DL
ALP SERPL-CCNC: 107 U/L (ref 39–117)
ALT SERPL W P-5'-P-CCNC: 18 U/L (ref 1–33)
ANION GAP SERPL CALCULATED.3IONS-SCNC: 13 MMOL/L (ref 5–15)
AST SERPL-CCNC: 15 U/L (ref 1–32)
BILIRUB SERPL-MCNC: 0.8 MG/DL (ref 0.2–1.2)
BUN BLD-MCNC: 30 MG/DL (ref 8–23)
BUN/CREAT SERPL: 16.2 (ref 7–25)
CALCIUM SPEC-SCNC: 10.9 MG/DL (ref 8.6–10.5)
CHLORIDE SERPL-SCNC: 92 MMOL/L (ref 98–107)
CO2 SERPL-SCNC: 35 MMOL/L (ref 22–29)
CREAT BLD-MCNC: 1.85 MG/DL (ref 0.57–1)
GFR SERPL CREATININE-BSD FRML MDRD: 27 ML/MIN/1.73
GLOBULIN UR ELPH-MCNC: 2.8 GM/DL
GLUCOSE BLD-MCNC: 83 MG/DL (ref 65–99)
GLUCOSE BLDC GLUCOMTR-MCNC: 104 MG/DL (ref 70–130)
HBA1C MFR BLD: 5.7 %
POTASSIUM BLD-SCNC: 4.3 MMOL/L (ref 3.5–5.2)
PROT SERPL-MCNC: 7 G/DL (ref 6–8.5)
SODIUM BLD-SCNC: 140 MMOL/L (ref 136–145)
T4 FREE SERPL-MCNC: 1.83 NG/DL (ref 0.93–1.7)
TSH SERPL DL<=0.05 MIU/L-ACNC: 5.22 MIU/ML (ref 0.27–4.2)

## 2019-07-24 PROCEDURE — 82306 VITAMIN D 25 HYDROXY: CPT | Performed by: INTERNAL MEDICINE

## 2019-07-24 PROCEDURE — 80053 COMPREHEN METABOLIC PANEL: CPT | Performed by: INTERNAL MEDICINE

## 2019-07-24 PROCEDURE — 84443 ASSAY THYROID STIM HORMONE: CPT | Performed by: INTERNAL MEDICINE

## 2019-07-24 PROCEDURE — 84439 ASSAY OF FREE THYROXINE: CPT | Performed by: INTERNAL MEDICINE

## 2019-07-24 PROCEDURE — 99214 OFFICE O/P EST MOD 30 MIN: CPT | Performed by: INTERNAL MEDICINE

## 2019-07-24 PROCEDURE — 83036 HEMOGLOBIN GLYCOSYLATED A1C: CPT | Performed by: INTERNAL MEDICINE

## 2019-07-24 PROCEDURE — 82947 ASSAY GLUCOSE BLOOD QUANT: CPT | Performed by: INTERNAL MEDICINE

## 2019-07-24 RX ORDER — UBIDECARENONE 100 MG
200 CAPSULE ORAL DAILY
COMMUNITY

## 2019-07-24 RX ORDER — RANITIDINE 150 MG/1
150 TABLET ORAL 2 TIMES DAILY
COMMUNITY
End: 2019-08-01

## 2019-07-24 RX ORDER — FERROUS SULFATE 325(65) MG
325 TABLET ORAL 2 TIMES DAILY
COMMUNITY

## 2019-07-24 NOTE — PROGRESS NOTES
Chief complaint  Diabetes (Follow Up)    Subjective   Anny Santos is a 64 y.o. female is here today for follow-up.  Follow-up for adrenal tumor. Patient has adrenal incidentaloma found on CT scan ordered for pulmonary nodules on dec 2012. Repeat CT scan  showed unchanged 3.5 cm adrenal adenoma with benign radiologic characteristics - low density.   CT adrenal 10/21/2016 - Stable 3.7 cm low-density left adrenal nodule. Hounsfield units less than 10.   2018 CT adrenals 4 cm adrenal adenoma with low Hounsfield units.       Patient has multiple medical problems, including hypertension, hyperlipidemia, asthma with COPD, SVT, impaired glucose tolerance, uncontrolled hypertension and worsening edema. She was admitted to the hospital with severe hypercalcemia and renal insufficiency, now followed with nephrologist.     Diabetes mellitus type 2 Her diabetes is well controlled.     Hypothyroidism - started levothyroxine in 10/2018. Increased the dose to 75 mcg daily and noticed insomnia and increased tremors.     Hypercalcemia/CKD with the calcium level of 13 at the time of presenting to the [facility]. She was given IVF. Renal function insufficiency - with creatinine of 2.2. Overall the picture of secondary hyperparathyroidism was seen. Last labs showed normal calcium of 10.1 and borderline PTH of 90.1.   She has seen nephrologist and her kidney disease is improved.     C/o insomnia, tremors, jerky movements at night.     Medications    Current Outpatient Medications:   •  amiodarone (PACERONE) 200 MG tablet, Take 200 mg by mouth Daily., Disp: , Rfl:   •  atorvastatin (LIPITOR) 40 MG tablet, Take 40 mg by mouth Every Night., Disp: , Rfl:   •  coenzyme Q10 100 MG capsule, Take 100 mg by mouth Daily., Disp: , Rfl:   •  Cyanocobalamin (VITAMIN B-12 PO), Take 1,000 mcg by mouth Daily., Disp: , Rfl:   •  ELIQUIS 5 MG tablet tablet, Take 5 mg by mouth Every 12 (Twelve) Hours., Disp: , Rfl:   •  ferrous sulfate 325 (65  FE) MG tablet, Take 325 mg by mouth Daily With Breakfast., Disp: , Rfl:   •  fluticasone (FLONASE) 50 MCG/ACT nasal spray, 2 sprays into the nostril(s) as directed by provider 2 (Two) Times a Day. Administer 2 sprays in each nostril for each dose. , Disp: , Rfl:   •  furosemide (LASIX) 20 MG tablet, Take 1 tablet by mouth Daily., Disp: 180 tablet, Rfl: 1  •  ipratropium-albuterol (DUO-NEB) 0.5-2.5 mg/mL nebulizer, Take 1.5 mL by nebulization 2 (Two) Times a Day. Administer one 3 ML vial 4 times daily via Nebulization as needed for shortness of breath, wheezing, Disp: , Rfl:   •  levothyroxine (SYNTHROID, LEVOTHROID) 75 MCG tablet, Take 1 tablet by mouth Daily., Disp: 30 tablet, Rfl: 11  •  magnesium oxide (MAGOX) 400 (241.3 Mg) MG tablet tablet, Take 400 mg by mouth Daily., Disp: , Rfl:   •  metoprolol succinate XL (TOPROL-XL) 50 MG 24 hr tablet, Take 50 mg by mouth 2 (Two) Times a Day. Patient takes 25mg in the morning and 25mg in the evening., Disp: , Rfl:   •  potassium chloride (K-DUR,KLOR-CON) 10 MEQ CR tablet, Take 2 tablets by mouth Daily., Disp: , Rfl: 1  •  raNITIdine (ZANTAC) 150 MG tablet, Take 150 mg by mouth 2 (Two) Times a Day., Disp: , Rfl:     PMH  The following portions of the patient's history were reviewed and updated as appropriate: allergies, current medications, past family history, past medical history, past social history, past surgical history and problem list.    Review of systems  Review of Systems   Constitutional: Positive for fatigue.   Respiratory: Positive for shortness of breath. Cough: after URI.    Cardiovascular: Positive for palpitations and leg swelling.   Gastrointestinal: Positive for abdominal pain and constipation.   Musculoskeletal: Positive for gait problem (ambulates with walker).   Neurological: Positive for weakness.   Psychiatric/Behavioral: Positive for sleep disturbance (insomnia).       Physical exam  Objective   Blood pressure 120/70, pulse 69, height 165.1 cm  "(65\"), weight 111 kg (244 lb 3.2 oz), SpO2 94 %.   Physical Exam   Constitutional: She is oriented to person, place, and time. She appears well-developed and well-nourished.   HENT:   Head: Normocephalic and atraumatic.   Eyes: Conjunctivae are normal.   Neck: No thyromegaly present.   The neck is supple and free of adenopathy or masses, the thyroid is normal without enlargement or nodules.   Cardiovascular: Normal rate, regular rhythm and normal heart sounds.   Pulmonary/Chest: Effort normal.   Decreased air entry bilaterally   Musculoskeletal: She exhibits edema (trace).   Lymphadenopathy:     She has no cervical adenopathy.   Neurological: She is alert and oriented to person, place, and time.   Skin: Skin is warm and dry. No rash noted.   Psychiatric: She has a normal mood and affect. Thought content normal.   Vitals reviewed.        LABS AND IMAGING    Results for orders placed or performed in visit on 07/24/19   POC Glucose Fingerstick   Result Value Ref Range    Glucose 104 70 - 130 mg/dL   POC Glycosylated Hemoglobin (Hb A1C)   Result Value Ref Range    Hemoglobin A1C 5.7 %      CT adrenal showed 4 cm left adrenal nodule.   Assessment  Assessment/Plan   1. Controlled type 2 diabetes mellitus without complication, without long-term current use of insulin (CMS/[facility])    2. Adrenal cortical adenoma, unspecified laterality    3. Vitamin D deficiency    4. Acute renal failure superimposed on stage 3 chronic kidney disease, unspecified acute renal failure type (CMS/HCC)    5. Steroid-induced diabetes mellitus (CMS/HCC)    6. Hypercalcemia    7. Hypothyroidism, adult      Orders Placed This Encounter   Procedures   • POC Glucose Fingerstick   • POC Glycosylated Hemoglobin (Hb A1C)     Plan  Hypercalcemia - last calcium level stabilized with elevated PTH. I have performed bedside u/s last visit and was not able to localize parathyroid adenoma. She has seen nephrologist and her kidney function is improved.      Diabetes " mellitus type 2 is well controlled on diet  A1C is 5.7.     Adrenal left nodule -Reviewed images and report of CT. Adrenal nodule is low density, but slowly growing - 4 cm now. Biochemical evaluation was negative for secreting adenoma.     Hypothyroidism continue levothyroxine 75 mcg. Her levels of energy is better, but she has shakiness.   Check levels and will adjust. I have advised to take it in the morning when she wakes up and not at 4 am.     Follow-up in 3 months.

## 2019-08-01 RX ORDER — POTASSIUM CHLORIDE 750 MG/1
20 TABLET, EXTENDED RELEASE ORAL 2 TIMES DAILY
Refills: 1
Start: 2019-08-01

## 2019-08-01 RX ORDER — FUROSEMIDE 20 MG/1
40 TABLET ORAL DAILY
Qty: 180 TABLET | Refills: 1
Start: 2019-08-01 | End: 2019-08-01

## 2019-08-01 RX ORDER — METOLAZONE 5 MG/1
5 TABLET ORAL DAILY
Qty: 30 TABLET | Refills: 0
Start: 2019-08-01 | End: 2020-03-13

## 2019-08-01 RX ORDER — RANITIDINE 150 MG/1
300 TABLET ORAL NIGHTLY
Qty: 60 TABLET | Refills: 0
Start: 2019-08-01 | End: 2020-03-13

## 2019-08-01 RX ORDER — FUROSEMIDE 40 MG/1
80 TABLET ORAL DAILY
Start: 2019-08-01 | End: 2019-10-30

## 2019-10-30 ENCOUNTER — OFFICE VISIT (OUTPATIENT)
Dept: ENDOCRINOLOGY | Facility: CLINIC | Age: 64
End: 2019-10-30

## 2019-10-30 VITALS
WEIGHT: 244.8 LBS | SYSTOLIC BLOOD PRESSURE: 120 MMHG | HEART RATE: 63 BPM | HEIGHT: 65 IN | DIASTOLIC BLOOD PRESSURE: 70 MMHG | BODY MASS INDEX: 40.79 KG/M2 | OXYGEN SATURATION: 97 %

## 2019-10-30 DIAGNOSIS — E11.9 CONTROLLED TYPE 2 DIABETES MELLITUS WITHOUT COMPLICATION, WITHOUT LONG-TERM CURRENT USE OF INSULIN (HCC): Primary | ICD-10-CM

## 2019-10-30 DIAGNOSIS — D35.00 ADRENAL CORTICAL ADENOMA, UNSPECIFIED LATERALITY: ICD-10-CM

## 2019-10-30 DIAGNOSIS — E03.9 HYPOTHYROIDISM, ADULT: ICD-10-CM

## 2019-10-30 LAB
GLUCOSE BLDC GLUCOMTR-MCNC: 143 MG/DL (ref 70–130)
HBA1C MFR BLD: 5.1 %

## 2019-10-30 PROCEDURE — 82947 ASSAY GLUCOSE BLOOD QUANT: CPT | Performed by: INTERNAL MEDICINE

## 2019-10-30 PROCEDURE — 99214 OFFICE O/P EST MOD 30 MIN: CPT | Performed by: INTERNAL MEDICINE

## 2019-10-30 PROCEDURE — 83036 HEMOGLOBIN GLYCOSYLATED A1C: CPT | Performed by: INTERNAL MEDICINE

## 2019-10-30 RX ORDER — FUROSEMIDE 40 MG/1
20 TABLET ORAL 2 TIMES DAILY
Start: 2019-10-30

## 2019-10-30 NOTE — PATIENT INSTRUCTIONS
Results for orders placed or performed in visit on 10/30/19   POC Glucose Fingerstick   Result Value Ref Range    Glucose 143 (A) 70 - 130 mg/dL   POC Glycosylated Hemoglobin (Hb A1C)   Result Value Ref Range    Hemoglobin A1C 5.1 %

## 2019-10-30 NOTE — PROGRESS NOTES
Chief complaint  Diabetes (Follow Up) and Hypothyroidism    Subjective   Anny Santos is a 64 y.o. female is here today for follow-up.  Follow-up for adrenal tumor. Patient has adrenal incidentaloma found on CT scan ordered for pulmonary nodules on dec 2012. Repeat CT scan  showed unchanged 3.5 cm adrenal adenoma with benign radiologic characteristics - low density.   CT adrenal 10/21/2016 - Stable 3.7 cm low-density left adrenal nodule. Hounsfield units less than 10.   2018 CT adrenals 4 cm adrenal adenoma with low Hounsfield units.       Patient has multiple medical problems, including hypertension, hyperlipidemia, asthma with COPD, SVT, impaired glucose tolerance, uncontrolled hypertension and worsening edema. She was admitted to the hospital with severe hypercalcemia and renal insufficiency, now followed with nephrologist. Has appointment next week, labs to be done tomorrow.     Diabetes mellitus type 2 Her diabetes is well controlled.     Hypothyroidism - started levothyroxine in 10/2018. Increased the dose to 75 mcg daily and noticed insomnia and increased tremors, last visit we reduced to alternating doses 50/75 mcg .     Hypercalcemia/CKD with the calcium level of 13 at the time of presenting to the [facility]. She was given IVF. Renal function insufficiency - with creatinine of 2.2. Overall the picture of secondary hyperparathyroidism was seen. Last labs showed normal calcium of 10.1 and borderline PTH of 90.1.   She has seen nephrologist and her kidney disease is improved. Lasix was decreased and takes metolazone prn only.     Tremors and shakiness improved after thyroid medication reduced to 50 mcg daily. She c/o weight gain and worsening of the swelling. She is using the compression wraps from the podiatrist. Walking is increased. Leg swelling is improved with compression device  Cataract surgery right eye last week.     Medications    Current Outpatient Medications:   •  amiodarone (PACERONE)  200 MG tablet, Take 200 mg by mouth Daily., Disp: , Rfl:   •  atorvastatin (LIPITOR) 40 MG tablet, Take 40 mg by mouth Every Night., Disp: , Rfl:   •  coenzyme Q10 100 MG capsule, Take 100 mg by mouth Daily., Disp: , Rfl:   •  Cyanocobalamin (VITAMIN B-12 PO), Take 1,000 mcg by mouth Daily., Disp: , Rfl:   •  ELIQUIS 5 MG tablet tablet, Take 5 mg by mouth Every 12 (Twelve) Hours., Disp: , Rfl:   •  ferrous sulfate 325 (65 FE) MG tablet, Take 325 mg by mouth Daily With Breakfast., Disp: , Rfl:   •  fluticasone (FLONASE) 50 MCG/ACT nasal spray, 2 sprays into the nostril(s) as directed by provider 2 (Two) Times a Day. Administer 2 sprays in each nostril for each dose. , Disp: , Rfl:   •  furosemide (LASIX) 40 MG tablet, Take 0.5 tablets by mouth 2 (Two) Times a Day., Disp: , Rfl:   •  ipratropium-albuterol (DUO-NEB) 0.5-2.5 mg/mL nebulizer, Take 1.5 mL by nebulization 2 (Two) Times a Day. Administer one 3 ML vial 4 times daily via Nebulization as needed for shortness of breath, wheezing, Disp: , Rfl:   •  levothyroxine (SYNTHROID, LEVOTHROID) 75 MCG tablet, Take 1 tablet by mouth Daily., Disp: 30 tablet, Rfl: 11  •  magnesium oxide (MAGOX) 400 (241.3 Mg) MG tablet tablet, Take 400 mg by mouth Daily., Disp: , Rfl:   •  metOLazone (ZAROXOLYN) 5 MG tablet, Take 1 tablet by mouth Daily. (Patient taking differently: Take 5 mg by mouth Daily As Needed.), Disp: 30 tablet, Rfl: 0  •  metoprolol succinate XL (TOPROL-XL) 50 MG 24 hr tablet, Take 50 mg by mouth 2 (Two) Times a Day. Patient takes 25mg in the morning and 25mg in the evening., Disp: , Rfl:   •  potassium chloride (K-DUR,KLOR-CON) 10 MEQ CR tablet, Take 2 tablets by mouth 2 (Two) Times a Day. (Patient taking differently: Take 20 mEq by mouth Daily.), Disp: , Rfl: 1  •  raNITIdine (ZANTAC) 150 MG tablet, Take 2 tablets by mouth Every Night., Disp: 60 tablet, Rfl: 0    PMH  The following portions of the patient's history were reviewed and updated as appropriate:  "allergies, current medications, past family history, past medical history, past social history, past surgical history and problem list.    Review of systems  Review of Systems   Constitutional: Positive for fatigue.   Respiratory: Positive for shortness of breath. Cough: after URI.    Cardiovascular: Positive for palpitations and leg swelling.   Gastrointestinal: Positive for abdominal pain and constipation.   Musculoskeletal: Positive for gait problem (ambulates with walker).   Neurological: Positive for weakness and headaches.   Psychiatric/Behavioral: Positive for sleep disturbance (insomnia).       Physical exam  Objective   Blood pressure 120/70, pulse 63, height 165.1 cm (65\"), weight 111 kg (244 lb 12.8 oz), SpO2 97 %.   Physical Exam   Constitutional: She is oriented to person, place, and time. She appears well-developed and well-nourished.   HENT:   Head: Normocephalic and atraumatic.   Eyes: Conjunctivae are normal.   Neck:   The neck is supple and free of adenopathy or masses, the thyroid is normal without enlargement or nodules.   Cardiovascular: Normal rate, regular rhythm and normal heart sounds.   Pulmonary/Chest: Effort normal.   Decreased air entry bilaterally   Musculoskeletal: She exhibits edema (trace).   Neurological: She is alert and oriented to person, place, and time.   Skin: Skin is warm and dry. No rash noted.   Psychiatric: She has a normal mood and affect. Thought content normal.   Vitals reviewed.        LABS AND IMAGING    Results for orders placed or performed in visit on 10/30/19   POC Glucose Fingerstick   Result Value Ref Range    Glucose 143 (A) 70 - 130 mg/dL   POC Glycosylated Hemoglobin (Hb A1C)   Result Value Ref Range    Hemoglobin A1C 5.1 %      CT adrenal showed 4 cm left adrenal nodule.   Assessment  Assessment/Plan   1. Controlled type 2 diabetes mellitus without complication, without long-term current use of insulin (CMS/[facility])    2. Hypothyroidism, adult    3. Adrenal " cortical adenoma, unspecified laterality      Orders Placed This Encounter   Procedures   • POC Glucose Fingerstick   • POC Glycosylated Hemoglobin (Hb A1C)     Plan  Hypercalcemia/possible hyperparathyroidism - last calcium level stabilized with elevated PTH. I have performed bedside u/s last visit and was not able to localize parathyroid adenoma. She has seen nephrologist and her kidney function is improved.      Diabetes mellitus type 2 is well controlled on diet  A1C is 5.1. Most likely low A1C is due to anemia.     Adrenal left nodule -Reviewed images and report of CT. Adrenal nodule is low density, but slowly growing - 4 cm now. Biochemical evaluation was negative for secreting adenoma.     Hypothyroidism continue levothyroxine 50/75 mcg alternating doses. Check levels and will adjust. Last visit we have reduced the dose.   I have given her an orders for thyroid labs to be done tomorrow.     Follow-up in 3 months.

## 2019-11-26 ENCOUNTER — LAB (OUTPATIENT)
Dept: LAB | Facility: HOSPITAL | Age: 64
End: 2019-11-26

## 2019-11-26 ENCOUNTER — HOSPITAL ENCOUNTER (OUTPATIENT)
Dept: GENERAL RADIOLOGY | Facility: HOSPITAL | Age: 64
Discharge: HOME | End: 2019-11-26
Admitting: INTERNAL MEDICINE

## 2019-11-26 ENCOUNTER — TRANSCRIBE ORDERS (OUTPATIENT)
Dept: LAB | Facility: HOSPITAL | Age: 64
End: 2019-11-26

## 2019-11-26 DIAGNOSIS — Z79.899 ENCOUNTER FOR LONG-TERM (CURRENT) USE OF OTHER MEDICATIONS: ICD-10-CM

## 2019-11-26 DIAGNOSIS — Z79.899 ENCOUNTER FOR LONG-TERM (CURRENT) USE OF OTHER MEDICATIONS: Primary | ICD-10-CM

## 2019-11-26 LAB
ALBUMIN SERPL-MCNC: 4.3 G/DL (ref 3.5–5.2)
ALBUMIN/GLOB SERPL: 1.5 G/DL
ALP SERPL-CCNC: 81 U/L (ref 39–117)
ALT SERPL W P-5'-P-CCNC: 13 U/L (ref 1–33)
ANION GAP SERPL CALCULATED.3IONS-SCNC: 9.5 MMOL/L (ref 5–15)
AST SERPL-CCNC: 13 U/L (ref 1–32)
BILIRUB SERPL-MCNC: 0.6 MG/DL (ref 0.2–1.2)
BUN BLD-MCNC: 32 MG/DL (ref 8–23)
BUN/CREAT SERPL: 18.5 (ref 7–25)
CALCIUM SPEC-SCNC: 10.1 MG/DL (ref 8.6–10.5)
CHLORIDE SERPL-SCNC: 94 MMOL/L (ref 98–107)
CO2 SERPL-SCNC: 38.5 MMOL/L (ref 22–29)
CREAT BLD-MCNC: 1.73 MG/DL (ref 0.57–1)
GFR SERPL CREATININE-BSD FRML MDRD: 29 ML/MIN/1.73
GLOBULIN UR ELPH-MCNC: 2.8 GM/DL
GLUCOSE BLD-MCNC: 80 MG/DL (ref 65–99)
POTASSIUM BLD-SCNC: 4.6 MMOL/L (ref 3.5–5.2)
PROT SERPL-MCNC: 7.1 G/DL (ref 6–8.5)
SODIUM BLD-SCNC: 142 MMOL/L (ref 136–145)
TSH SERPL DL<=0.05 MIU/L-ACNC: 4.46 UIU/ML (ref 0.27–4.2)

## 2019-11-26 PROCEDURE — 71046 X-RAY EXAM CHEST 2 VIEWS: CPT

## 2019-11-26 PROCEDURE — 84443 ASSAY THYROID STIM HORMONE: CPT

## 2019-11-26 PROCEDURE — 36415 COLL VENOUS BLD VENIPUNCTURE: CPT

## 2019-11-26 PROCEDURE — 80053 COMPREHEN METABOLIC PANEL: CPT

## 2019-11-30 DIAGNOSIS — E03.9 HYPOTHYROIDISM, ADULT: Primary | ICD-10-CM

## 2019-12-02 RX ORDER — LEVOTHYROXINE SODIUM 0.07 MG/1
75 TABLET ORAL DAILY
Qty: 30 TABLET | Refills: 11
Start: 2019-12-02 | End: 2020-03-19 | Stop reason: SDUPTHER

## 2019-12-05 ENCOUNTER — TELEPHONE (OUTPATIENT)
Dept: INTERNAL MEDICINE | Facility: CLINIC | Age: 64
End: 2019-12-05

## 2019-12-05 NOTE — TELEPHONE ENCOUNTER
Regarding: RE: Test Results Question  Contact: [phone]  ----- Message from Miguel Angel, Generic sent at 12/5/2019 12:27 PM EST -----    I have A-Fib. (Only episodes now I think). I’m on Amiodarone for that.  Thank you, but the cardiologist got me some. Thanks. Adia  ----- Message -----  From: NHAN MCCULLOUGH  Sent: 12/5/2019  8:30 AM EST  To: Anny Santos  Subject: RE: Test Results Question  Good Morning,    Please see Dr. Vela message below.      What is she taking eliquis for? Arrhythmia, blood clots or other cause. We may have xarelto samples which could be an alternative in some cases. I would advice to discuss with cardiologist or provider who prescribes eliquis if it could be switched. This med could not be stopped, needs alternative anticoagulant.   Also please check for 30 day free eliquis trial that I have seen and should work for medicare.     Danita     ----- Message -----     From: Anny Santos     Sent: 12/2/2019 11:04 PM EST       To: Alejandra Vela MD  Subject: RE: Test Results Question    Do you have any samples of the Eliquis 5 mg? I have enough for this week. Thats all. They just keep going up in price. Even with GoodRx, a 30 day supply (60 tabs) is $470.65! They are killing us poor people. I cant afford that. Please help if you can. Thank you. Adia  (I'm asking everybody I can).      ----- Message -----  From: Alejandra Vela MD  Sent: 12/2/19, 8:56 AM  To: Anny Santos  Subject: RE: Test Results Question    OK ,I will update the chart that you are on 50/75 mcg dose. Continue the same. Do you have enough prescription for both doses?     ----- Message -----     From: Anny Santos     Sent: 12/1/2019  7:33 PM EST       To: Alejandra Vela MD  Subject: RE: Test Results Question    I’m NOT on the 75 mcg every day. It’s every OTHER day, alternating with 50 mcg every OTHER day. Thank you so much. I’ll stay in the currant dose. Adia.   ----- Message -----  From: Alejandra Vela MD  Sent:  11/30/2019  1:31 PM EST  To: Anny Santos  Subject: RE: Test Results Question  Thanks for the update. I have looked at your thyroid function and it is improved compared to 11/09 but the levels are incomplete (free T4 is missing). Amiodarone often affect thyroid function. Please continue same dose levothyroxine 75 mcg and repeat labs in 1-2 months. Ii have left orders in the chart        ----- Message -----     From: Anny Santos     Sent: 11/29/2019 11:04 AM EST       To: Alejandra Vela MD  Subject: Test Results Question    Tuesday I had labs drawn and chest X-rays done for the cardiologist. (Bi annual follow up because of the Amiodarone I'm on.). They were done at [facility] here in [place]. You should be able to pull up the results. Also, my cardiologist left this practice at [place], so they've scheduled me to have an appointment with a new EP cardiologist next month. Dr. Dragan Willams. Thanks, Adia

## 2020-02-20 ENCOUNTER — TRANSCRIBE ORDERS (OUTPATIENT)
Dept: LAB | Facility: HOSPITAL | Age: 65
End: 2020-02-20

## 2020-02-20 ENCOUNTER — LAB (OUTPATIENT)
Dept: LAB | Facility: HOSPITAL | Age: 65
End: 2020-02-20

## 2020-02-20 DIAGNOSIS — D53.9 MACROCYTIC ANEMIA: Primary | ICD-10-CM

## 2020-02-20 DIAGNOSIS — E03.9 HYPOTHYROIDISM, ADULT: ICD-10-CM

## 2020-02-20 LAB
BASOPHILS # BLD AUTO: 0.04 10*3/MM3 (ref 0–0.2)
BASOPHILS NFR BLD AUTO: 0.6 % (ref 0–1.5)
DEPRECATED RDW RBC AUTO: 47.5 FL (ref 37–54)
EOSINOPHIL # BLD AUTO: 0.09 10*3/MM3 (ref 0–0.4)
EOSINOPHIL NFR BLD AUTO: 1.3 % (ref 0.3–6.2)
ERYTHROCYTE [DISTWIDTH] IN BLOOD BY AUTOMATED COUNT: 12.8 % (ref 12.3–15.4)
HCT VFR BLD AUTO: 34.4 % (ref 34–46.6)
HGB BLD-MCNC: 11 G/DL (ref 12–15.9)
IMM GRANULOCYTES # BLD AUTO: 0.02 10*3/MM3 (ref 0–0.05)
IMM GRANULOCYTES NFR BLD AUTO: 0.3 % (ref 0–0.5)
LYMPHOCYTES # BLD AUTO: 1.27 10*3/MM3 (ref 0.7–3.1)
LYMPHOCYTES NFR BLD AUTO: 17.7 % (ref 19.6–45.3)
MCH RBC QN AUTO: 32.7 PG (ref 26.6–33)
MCHC RBC AUTO-ENTMCNC: 32 G/DL (ref 31.5–35.7)
MCV RBC AUTO: 102.4 FL (ref 79–97)
MONOCYTES # BLD AUTO: 0.66 10*3/MM3 (ref 0.1–0.9)
MONOCYTES NFR BLD AUTO: 9.2 % (ref 5–12)
NEUTROPHILS # BLD AUTO: 5.09 10*3/MM3 (ref 1.7–7)
NEUTROPHILS NFR BLD AUTO: 70.9 % (ref 42.7–76)
NRBC BLD AUTO-RTO: 0 /100 WBC (ref 0–0.2)
PLATELET # BLD AUTO: 172 10*3/MM3 (ref 140–450)
PMV BLD AUTO: 9.5 FL (ref 6–12)
RBC # BLD AUTO: 3.36 10*6/MM3 (ref 3.77–5.28)
T4 FREE SERPL-MCNC: 1.73 NG/DL (ref 0.93–1.7)
TSH SERPL DL<=0.05 MIU/L-ACNC: 6.37 UIU/ML (ref 0.27–4.2)
WBC NRBC COR # BLD: 7.17 10*3/MM3 (ref 3.4–10.8)

## 2020-02-20 PROCEDURE — 85025 COMPLETE CBC W/AUTO DIFF WBC: CPT | Performed by: FAMILY MEDICINE

## 2020-02-20 PROCEDURE — 36415 COLL VENOUS BLD VENIPUNCTURE: CPT | Performed by: FAMILY MEDICINE

## 2020-02-20 PROCEDURE — 84439 ASSAY OF FREE THYROXINE: CPT

## 2020-02-20 PROCEDURE — 84443 ASSAY THYROID STIM HORMONE: CPT

## 2020-03-13 ENCOUNTER — OFFICE VISIT (OUTPATIENT)
Dept: ENDOCRINOLOGY | Facility: CLINIC | Age: 65
End: 2020-03-13

## 2020-03-13 VITALS
SYSTOLIC BLOOD PRESSURE: 115 MMHG | HEART RATE: 88 BPM | DIASTOLIC BLOOD PRESSURE: 66 MMHG | WEIGHT: 271.1 LBS | HEIGHT: 65 IN | BODY MASS INDEX: 45.17 KG/M2 | OXYGEN SATURATION: 96 %

## 2020-03-13 DIAGNOSIS — T38.0X5A STEROID-INDUCED DIABETES MELLITUS (HCC): ICD-10-CM

## 2020-03-13 DIAGNOSIS — E09.9 STEROID-INDUCED DIABETES MELLITUS (HCC): ICD-10-CM

## 2020-03-13 DIAGNOSIS — E03.9 HYPOTHYROIDISM, ADULT: ICD-10-CM

## 2020-03-13 DIAGNOSIS — E11.9 CONTROLLED TYPE 2 DIABETES MELLITUS WITHOUT COMPLICATION, WITHOUT LONG-TERM CURRENT USE OF INSULIN (HCC): Primary | ICD-10-CM

## 2020-03-13 LAB
EXPIRATION DATE: ABNORMAL
EXPIRATION DATE: NORMAL
GLUCOSE BLDC GLUCOMTR-MCNC: 141 MG/DL (ref 70–130)
HBA1C MFR BLD: 5.7 %
Lab: ABNORMAL
Lab: NORMAL

## 2020-03-13 PROCEDURE — 99213 OFFICE O/P EST LOW 20 MIN: CPT | Performed by: INTERNAL MEDICINE

## 2020-03-13 PROCEDURE — 82947 ASSAY GLUCOSE BLOOD QUANT: CPT | Performed by: INTERNAL MEDICINE

## 2020-03-13 PROCEDURE — 83036 HEMOGLOBIN GLYCOSYLATED A1C: CPT | Performed by: INTERNAL MEDICINE

## 2020-03-13 RX ORDER — CHLORAL HYDRATE 500 MG
2000 CAPSULE ORAL 2 TIMES DAILY WITH MEALS
COMMUNITY

## 2020-03-13 RX ORDER — MELATONIN
1000 DAILY
COMMUNITY
End: 2021-01-08 | Stop reason: ALTCHOICE

## 2020-03-13 RX ORDER — FAMOTIDINE 20 MG/1
20 TABLET, FILM COATED ORAL 2 TIMES DAILY
COMMUNITY
End: 2021-03-30

## 2020-03-13 RX ORDER — LEVOTHYROXINE SODIUM 0.05 MG/1
TABLET ORAL
COMMUNITY
Start: 2019-12-16 | End: 2020-03-19 | Stop reason: SDUPTHER

## 2020-03-13 NOTE — PROGRESS NOTES
Chief complaint  Diabetes (Follow up)    Subjective   Anny Santos is a 64 y.o. female is here today for follow-up.  Follow-up for adrenal tumor. Patient has adrenal incidentaloma found on CT scan ordered for pulmonary nodules on dec 2012. Repeat CT scan  showed unchanged 3.5 cm adrenal adenoma with benign radiologic characteristics - low density.   CT adrenal 10/21/2016 - Stable 3.7 cm low-density left adrenal nodule. Hounsfield units less than 10.   2018 CT adrenals 4 cm adrenal adenoma with low Hounsfield units.       Patient has multiple medical problems, including hypertension, hyperlipidemia, asthma with COPD, O-2 dependent, SVT, impaired glucose tolerance, uncontrolled hypertension and worsening edema.     Diabetes mellitus type 2 Her diabetes is well controlled.     Hypothyroidism - started levothyroxine in 10/2018. Increased the dose to 75 mcg daily and noticed insomnia and increased tremors, last visit we reduced to alternating doses 50/75 mcg . Her recent labs showed TSh of 6.3 and high free T4 of 1.8. She is taking biotin supplements.     C/o SOB and cough, bronchitis since 11/2019. SHe is not hasving any fever or chills. She feels extremely fatigued and confused. Said she didn't sleep well for 2 days. SOB is present     Medications    Current Outpatient Medications:   •  amiodarone (PACERONE) 200 MG tablet, Take 200 mg by mouth Daily., Disp: , Rfl:   •  atorvastatin (LIPITOR) 40 MG tablet, Take 40 mg by mouth Every Night., Disp: , Rfl:   •  cholecalciferol (VITAMIN D3) 25 MCG (1000 UT) tablet, Take 1,000 Units by mouth Daily., Disp: , Rfl:   •  coenzyme Q10 100 MG capsule, Take 200 mg by mouth Daily., Disp: , Rfl:   •  Cyanocobalamin (VITAMIN B-12 PO), Take 1,000 mcg by mouth Daily., Disp: , Rfl:   •  ELIQUIS 5 MG tablet tablet, Take 5 mg by mouth Every 12 (Twelve) Hours., Disp: , Rfl:   •  famotidine (PEPCID) 20 MG tablet, Take 20 mg by mouth 2 (Two) Times a Day., Disp: , Rfl:   •  ferrous sulfate  325 (65 FE) MG tablet, Take 325 mg by mouth Daily With Breakfast., Disp: , Rfl:   •  fluticasone (FLONASE) 50 MCG/ACT nasal spray, 2 sprays into the nostril(s) as directed by provider 2 (Two) Times a Day. Administer 2 sprays in each nostril for each dose. , Disp: , Rfl:   •  furosemide (LASIX) 40 MG tablet, Take 0.5 tablets by mouth 2 (Two) Times a Day. (Patient taking differently: Take 40 mg by mouth 2 (Two) Times a Day.), Disp: , Rfl:   •  ipratropium-albuterol (DUO-NEB) 0.5-2.5 mg/mL nebulizer, Take 1.5 mL by nebulization 2 (Two) Times a Day. Administer one 3 ML vial 4 times daily via Nebulization as needed for shortness of breath, wheezing, Disp: , Rfl:   •  levothyroxine (SYNTHROID, LEVOTHROID) 75 MCG tablet, Take 1 tablet by mouth Daily. Patient is taking 50 mcg and 75 mcg alternating doses., Disp: 30 tablet, Rfl: 11  •  magnesium oxide (MAGOX) 400 (241.3 Mg) MG tablet tablet, Take 400 mg by mouth Daily., Disp: , Rfl:   •  metoprolol succinate XL (TOPROL-XL) 50 MG 24 hr tablet, Take 50 mg by mouth 2 (Two) Times a Day. Patient takes 25mg in the morning and 25mg in the evening., Disp: , Rfl:   •  Omega-3 Fatty Acids (FISH OIL) 1000 MG capsule capsule, Take 2,000 mg by mouth 2 (Two) Times a Day With Meals., Disp: , Rfl:   •  potassium chloride (K-DUR,KLOR-CON) 10 MEQ CR tablet, Take 2 tablets by mouth 2 (Two) Times a Day. (Patient taking differently: Take 20 mEq by mouth Daily.), Disp: , Rfl: 1  •  levothyroxine (SYNTHROID, LEVOTHROID) 50 MCG tablet, TAKE 1 TABLET BY MOUTH EVERY OTHER DAY ALTERNATE WITH 75MCG EVERY OTHER DAY, Disp: , Rfl:     PMH  The following portions of the patient's history were reviewed and updated as appropriate: allergies, current medications, past family history, past medical history, past social history, past surgical history and problem list.    Review of systems  Review of Systems   Constitutional: Positive for fatigue.   Respiratory: Positive for cough (after URI), chest tightness,  "shortness of breath and wheezing.    Cardiovascular: Positive for palpitations and leg swelling.   Gastrointestinal: Positive for constipation.   Musculoskeletal: Positive for gait problem (ambulates with walker).   Neurological: Positive for dizziness, weakness and headaches.   Psychiatric/Behavioral: Positive for confusion and sleep disturbance (insomnia).       Physical exam  Objective   Blood pressure 115/66, pulse 88, height 165.1 cm (65\"), weight 123 kg (271 lb 1.6 oz), SpO2 96 %.   Physical Exam   Constitutional: She is oriented to person, place, and time. She appears well-developed and well-nourished.   HENT:   Head: Normocephalic and atraumatic.   Eyes: Conjunctivae are normal.   Neck:   The neck is supple and free of adenopathy or masses, the thyroid is normal without enlargement or nodules.   Cardiovascular: Normal rate, regular rhythm and normal heart sounds.   Pulmonary/Chest: Effort normal.   Decreased air entry bilaterally   Musculoskeletal: She exhibits edema (trace).   Neurological: She is alert and oriented to person, place, and time.   Skin: Skin is warm and dry. No rash noted.   Psychiatric: She has a normal mood and affect. Thought content normal.   Vitals reviewed.        LABS AND IMAGING    Results for orders placed or performed in visit on 03/13/20   POC Glucose Fingerstick   Result Value Ref Range    Glucose 141 (A) 70 - 130 mg/dL    Lot Number [id number]     Expiration Date 2020-09-19    POC Glycosylated Hemoglobin (Hb A1C)   Result Value Ref Range    Hemoglobin A1C 5.7 %    Lot Number [id number]     Expiration Date 2021-12-26       Lab Results   Component Value Date    TSH 6.370 (H) 02/20/2020     CT adrenal showed 4 cm left adrenal nodule.   Assessment  Assessment/Plan   1. Controlled type 2 diabetes mellitus without complication, without long-term current use of insulin (CMS/[facility])    2. Steroid-induced diabetes mellitus (CMS/[facility])    3. Hypothyroidism, adult      Orders Placed This " Encounter   Procedures   • TSH   • T4, Free   • POC Glucose Fingerstick   • POC Glycosylated Hemoglobin (Hb A1C)     Plan     Diabetes mellitus type 2 is well controlled on diet  A1C is 5.1. Most likely low A1C is due to anemia.     Adrenal left nodule -Reviewed images and report of CT. Adrenal nodule is low density, but slowly growing - 4 cm now. Biochemical evaluation was negative for secreting adenoma.      Hypothyroidism continue levothyroxine 50/75 mcg alternating doses. Check levels and will adjust. Last visit we have reduced the dose.   I have given her an orders for thyroid labs to be done when she is at nephrologist office.   Patient appears SOB - I have advised to see pulmonary specialist today and check ABG. She may be retaining CO2, appears lethargic. O2 level is 96%. Advised to go to the ER if condition worsens. .     Follow-up in 3 months.

## 2020-03-18 ENCOUNTER — TELEPHONE (OUTPATIENT)
Dept: ENDOCRINOLOGY | Facility: CLINIC | Age: 65
End: 2020-03-18

## 2020-03-18 DIAGNOSIS — J44.9 CHRONIC OBSTRUCTIVE PULMONARY DISEASE, UNSPECIFIED COPD TYPE (HCC): Primary | ICD-10-CM

## 2020-03-18 NOTE — TELEPHONE ENCOUNTER
----- Message from Alejandra SCHULZ MD sent at 3/13/2020  6:29 PM EDT -----  Please call patient and check how is she feeling. Did she see her pulmonary provider? Also please remind her to have thyroid labs done at her nephrologist office.

## 2020-03-18 NOTE — TELEPHONE ENCOUNTER
Attempted contact. LMOM delivering message from Dr. Vela. Asked that she contact office to update on how she is doing

## 2020-03-18 NOTE — TELEPHONE ENCOUNTER
Spoke with patient she stated that she needed an order ABG they has her scheduled in April  Thyroid labs were completed today at Dr. Deleon

## 2020-03-19 RX ORDER — LEVOTHYROXINE SODIUM 0.05 MG/1
TABLET ORAL
Qty: 30 TABLET | Refills: 1 | Status: SHIPPED | OUTPATIENT
Start: 2020-03-19 | End: 2020-03-20 | Stop reason: SDUPTHER

## 2020-03-19 RX ORDER — LEVOTHYROXINE SODIUM 0.07 MG/1
TABLET ORAL
Qty: 90 TABLET | Refills: 1
Start: 2020-03-19 | End: 2020-03-20 | Stop reason: SDUPTHER

## 2020-03-20 RX ORDER — LEVOTHYROXINE SODIUM 0.05 MG/1
TABLET ORAL
Qty: 30 TABLET | Refills: 1 | Status: SHIPPED | OUTPATIENT
Start: 2020-03-20 | End: 2020-05-06 | Stop reason: SDUPTHER

## 2020-03-20 RX ORDER — LEVOTHYROXINE SODIUM 0.07 MG/1
TABLET ORAL
Qty: 90 TABLET | Refills: 1
Start: 2020-03-20 | End: 2020-03-20 | Stop reason: SDUPTHER

## 2020-03-20 RX ORDER — LEVOTHYROXINE SODIUM 0.07 MG/1
TABLET ORAL
Qty: 90 TABLET | Refills: 1 | Status: SHIPPED | OUTPATIENT
Start: 2020-03-20 | End: 2020-05-06 | Stop reason: SDUPTHER

## 2020-03-20 RX ORDER — LEVOTHYROXINE SODIUM 0.05 MG/1
TABLET ORAL
Qty: 30 TABLET | Refills: 1 | Status: SHIPPED | OUTPATIENT
Start: 2020-03-20 | End: 2020-03-20 | Stop reason: SDUPTHER

## 2020-03-20 NOTE — TELEPHONE ENCOUNTER
Spoke with patient multiple times today on the phone about her Levothyroxine RX. She stated pharmacy was not receiving the RX. I have printed off rx and faxed to [facility].

## 2020-05-04 ENCOUNTER — TRANSCRIBE ORDERS (OUTPATIENT)
Dept: LAB | Facility: HOSPITAL | Age: 65
End: 2020-05-04

## 2020-05-04 ENCOUNTER — LAB (OUTPATIENT)
Dept: LAB | Facility: HOSPITAL | Age: 65
End: 2020-05-04

## 2020-05-04 DIAGNOSIS — D53.9 MACROCYTIC ANEMIA: Primary | ICD-10-CM

## 2020-05-04 DIAGNOSIS — E78.2 MIXED HYPERLIPIDEMIA: ICD-10-CM

## 2020-05-04 DIAGNOSIS — I10 BENIGN HYPERTENSION: ICD-10-CM

## 2020-05-04 DIAGNOSIS — N18.30 CHRONIC KIDNEY DISEASE, STAGE III (MODERATE) (HCC): Primary | ICD-10-CM

## 2020-05-04 DIAGNOSIS — E03.8 TSH (THYROID-STIMULATING HORMONE DEFICIENCY): ICD-10-CM

## 2020-05-04 DIAGNOSIS — D53.9 MACROCYTIC ANEMIA: ICD-10-CM

## 2020-05-04 DIAGNOSIS — M10.9 GOUTY ARTHRITIS: ICD-10-CM

## 2020-05-04 LAB
ALBUMIN SERPL-MCNC: 4.3 G/DL (ref 3.5–5.2)
ALP SERPL-CCNC: 87 U/L (ref 39–117)
ALT SERPL W P-5'-P-CCNC: 13 U/L (ref 1–33)
ANION GAP SERPL CALCULATED.3IONS-SCNC: 12.6 MMOL/L (ref 5–15)
AST SERPL-CCNC: 14 U/L (ref 1–32)
BASOPHILS # BLD AUTO: 0.04 10*3/MM3 (ref 0–0.2)
BASOPHILS NFR BLD AUTO: 0.6 % (ref 0–1.5)
BILIRUB CONJ SERPL-MCNC: 0.2 MG/DL (ref 0.2–0.3)
BILIRUB INDIRECT SERPL-MCNC: 0.4 MG/DL
BILIRUB SERPL-MCNC: 0.6 MG/DL (ref 0.2–1.2)
BUN BLD-MCNC: 46 MG/DL (ref 8–23)
BUN/CREAT SERPL: 23.1 (ref 7–25)
CALCIUM SPEC-SCNC: 10.2 MG/DL (ref 8.6–10.5)
CHLORIDE SERPL-SCNC: 86 MMOL/L (ref 98–107)
CHOLEST SERPL-MCNC: 131 MG/DL (ref 0–200)
CO2 SERPL-SCNC: 38.4 MMOL/L (ref 22–29)
CREAT BLD-MCNC: 1.99 MG/DL (ref 0.57–1)
DEPRECATED RDW RBC AUTO: 47.5 FL (ref 37–54)
EOSINOPHIL # BLD AUTO: 0.08 10*3/MM3 (ref 0–0.4)
EOSINOPHIL NFR BLD AUTO: 1.2 % (ref 0.3–6.2)
ERYTHROCYTE [DISTWIDTH] IN BLOOD BY AUTOMATED COUNT: 12.5 % (ref 12.3–15.4)
FOLATE SERPL-MCNC: >20 NG/ML (ref 4.78–24.2)
GFR SERPL CREATININE-BSD FRML MDRD: 25 ML/MIN/1.73
GLUCOSE BLD-MCNC: 184 MG/DL (ref 65–99)
HCT VFR BLD AUTO: 32.5 % (ref 34–46.6)
HDLC SERPL-MCNC: 50 MG/DL (ref 40–60)
HGB BLD-MCNC: 10.5 G/DL (ref 12–15.9)
IMM GRANULOCYTES # BLD AUTO: 0.02 10*3/MM3 (ref 0–0.05)
IMM GRANULOCYTES NFR BLD AUTO: 0.3 % (ref 0–0.5)
IRON 24H UR-MRATE: 108 MCG/DL (ref 37–145)
IRON SATN MFR SERPL: 22 % (ref 20–50)
LDLC SERPL CALC-MCNC: 57 MG/DL (ref 0–100)
LDLC/HDLC SERPL: 1.15 {RATIO}
LYMPHOCYTES # BLD AUTO: 1.28 10*3/MM3 (ref 0.7–3.1)
LYMPHOCYTES NFR BLD AUTO: 19.4 % (ref 19.6–45.3)
MCH RBC QN AUTO: 33.7 PG (ref 26.6–33)
MCHC RBC AUTO-ENTMCNC: 32.3 G/DL (ref 31.5–35.7)
MCV RBC AUTO: 104.2 FL (ref 79–97)
MONOCYTES # BLD AUTO: 0.51 10*3/MM3 (ref 0.1–0.9)
MONOCYTES NFR BLD AUTO: 7.7 % (ref 5–12)
NEUTROPHILS # BLD AUTO: 4.67 10*3/MM3 (ref 1.7–7)
NEUTROPHILS NFR BLD AUTO: 70.8 % (ref 42.7–76)
NRBC BLD AUTO-RTO: 0 /100 WBC (ref 0–0.2)
PLATELET # BLD AUTO: 172 10*3/MM3 (ref 140–450)
PMV BLD AUTO: 9.6 FL (ref 6–12)
POTASSIUM BLD-SCNC: 4.4 MMOL/L (ref 3.5–5.2)
PROT SERPL-MCNC: 7.4 G/DL (ref 6–8.5)
RBC # BLD AUTO: 3.12 10*6/MM3 (ref 3.77–5.28)
SODIUM BLD-SCNC: 137 MMOL/L (ref 136–145)
TIBC SERPL-MCNC: 490 MCG/DL (ref 298–536)
TRANSFERRIN SERPL-MCNC: 329 MG/DL (ref 200–360)
TRIGL SERPL-MCNC: 118 MG/DL (ref 0–150)
URATE SERPL-MCNC: 12 MG/DL (ref 2.4–5.7)
VIT B12 BLD-MCNC: 1498 PG/ML (ref 211–946)
VLDLC SERPL-MCNC: 23.6 MG/DL (ref 5–40)
WBC NRBC COR # BLD: 6.6 10*3/MM3 (ref 3.4–10.8)

## 2020-05-04 PROCEDURE — 82306 VITAMIN D 25 HYDROXY: CPT

## 2020-05-04 PROCEDURE — 80048 BASIC METABOLIC PNL TOTAL CA: CPT

## 2020-05-04 PROCEDURE — 84550 ASSAY OF BLOOD/URIC ACID: CPT

## 2020-05-04 PROCEDURE — 80076 HEPATIC FUNCTION PANEL: CPT

## 2020-05-04 PROCEDURE — 84443 ASSAY THYROID STIM HORMONE: CPT

## 2020-05-04 PROCEDURE — 83970 ASSAY OF PARATHORMONE: CPT

## 2020-05-04 PROCEDURE — 82607 VITAMIN B-12: CPT

## 2020-05-04 PROCEDURE — 84466 ASSAY OF TRANSFERRIN: CPT

## 2020-05-04 PROCEDURE — 36415 COLL VENOUS BLD VENIPUNCTURE: CPT

## 2020-05-04 PROCEDURE — 85025 COMPLETE CBC W/AUTO DIFF WBC: CPT

## 2020-05-04 PROCEDURE — 84439 ASSAY OF FREE THYROXINE: CPT

## 2020-05-04 PROCEDURE — 84100 ASSAY OF PHOSPHORUS: CPT

## 2020-05-04 PROCEDURE — 80061 LIPID PANEL: CPT

## 2020-05-04 PROCEDURE — 82746 ASSAY OF FOLIC ACID SERUM: CPT

## 2020-05-04 PROCEDURE — 83540 ASSAY OF IRON: CPT

## 2020-05-05 ENCOUNTER — TRANSCRIBE ORDERS (OUTPATIENT)
Dept: ADMINISTRATIVE | Facility: HOSPITAL | Age: 65
End: 2020-05-05

## 2020-05-05 DIAGNOSIS — Z12.31 VISIT FOR SCREENING MAMMOGRAM: Primary | ICD-10-CM

## 2020-05-05 LAB
25(OH)D3 SERPL-MCNC: 62.4 NG/ML (ref 30–100)
PTH-INTACT SERPL-MCNC: 228 PG/ML (ref 15–65)
T4 FREE SERPL-MCNC: 1.64 NG/DL (ref 0.93–1.7)
TSH SERPL DL<=0.05 MIU/L-ACNC: 3.6 UIU/ML (ref 0.27–4.2)

## 2020-05-06 ENCOUNTER — TELEMEDICINE (OUTPATIENT)
Dept: ENDOCRINOLOGY | Facility: CLINIC | Age: 65
End: 2020-05-06

## 2020-05-06 VITALS
SYSTOLIC BLOOD PRESSURE: 104 MMHG | DIASTOLIC BLOOD PRESSURE: 62 MMHG | RESPIRATION RATE: 16 BRPM | OXYGEN SATURATION: 97 % | TEMPERATURE: 97.6 F | WEIGHT: 242 LBS | BODY MASS INDEX: 40.27 KG/M2 | HEART RATE: 72 BPM

## 2020-05-06 DIAGNOSIS — D35.00 ADRENAL CORTICAL ADENOMA, UNSPECIFIED LATERALITY: ICD-10-CM

## 2020-05-06 DIAGNOSIS — E03.9 HYPOTHYROIDISM, ADULT: ICD-10-CM

## 2020-05-06 DIAGNOSIS — E11.9 CONTROLLED TYPE 2 DIABETES MELLITUS WITHOUT COMPLICATION, WITHOUT LONG-TERM CURRENT USE OF INSULIN (HCC): Primary | ICD-10-CM

## 2020-05-06 LAB — PHOSPHATE SERPL-MCNC: 3.1 MG/DL (ref 2.5–4.5)

## 2020-05-06 PROCEDURE — 99213 OFFICE O/P EST LOW 20 MIN: CPT | Performed by: INTERNAL MEDICINE

## 2020-05-06 RX ORDER — LEVOTHYROXINE SODIUM 0.07 MG/1
TABLET ORAL
Qty: 90 TABLET | Refills: 3 | Status: SHIPPED | OUTPATIENT
Start: 2020-05-06 | End: 2021-02-04 | Stop reason: SDUPTHER

## 2020-05-06 RX ORDER — LEVOTHYROXINE SODIUM 0.05 MG/1
TABLET ORAL
Qty: 50 TABLET | Refills: 3 | Status: SHIPPED | OUTPATIENT
Start: 2020-05-06

## 2020-05-06 RX ORDER — BLOOD-GLUCOSE METER
KIT MISCELLANEOUS AS NEEDED
Qty: 1 EACH | Refills: 0 | Status: SHIPPED | OUTPATIENT
Start: 2020-05-06

## 2020-05-06 NOTE — PROGRESS NOTES
You have chosen to receive care through a telehealth visit.  Do you consent to use a video/audio connection for your medical care today? Yes    Chief complaint  Diabetes    Subjective   Anny Santos is a 65 y.o. female is here today for follow-up.  Follow-up for adrenal tumor. Patient has adrenal incidentaloma found on CT scan ordered for pulmonary nodules on dec 2012. Repeat CT scan  showed unchanged 3.5 cm adrenal adenoma with benign radiologic characteristics - low density.   CT adrenal 10/21/2016 - Stable 3.7 cm low-density left adrenal nodule. Hounsfield units less than 10.   2018 CT adrenals 4 cm adrenal adenoma with low Hounsfield units.       Patient has multiple medical problems, including hypertension, hyperlipidemia, asthma with COPD, O-2 dependent, SVT, impaired glucose tolerance, uncontrolled hypertension and worsening edema.     Diabetes mellitus type 2 Her diabetes is well controlled.     Hypothyroidism - started levothyroxine in 10/2018. Increased the dose to 75 mcg daily and noticed insomnia and increased tremors, last visit we reduced to alternating doses 50/75 mcg . She is feeling better on a current dose .    C/o SOB, has problems with the oxygen delivery.     Medications    Current Outpatient Medications:   •  amiodarone (PACERONE) 200 MG tablet, Take 200 mg by mouth Daily., Disp: , Rfl:   •  atorvastatin (LIPITOR) 40 MG tablet, Take 40 mg by mouth Every Night., Disp: , Rfl:   •  cholecalciferol (VITAMIN D3) 25 MCG (1000 UT) tablet, Take 1,000 Units by mouth Daily., Disp: , Rfl:   •  coenzyme Q10 100 MG capsule, Take 200 mg by mouth Daily., Disp: , Rfl:   •  Cyanocobalamin (VITAMIN B-12 PO), Take 1,000 mcg by mouth Daily., Disp: , Rfl:   •  ELIQUIS 5 MG tablet tablet, Take 5 mg by mouth Every 12 (Twelve) Hours., Disp: , Rfl:   •  famotidine (PEPCID) 20 MG tablet, Take 20 mg by mouth 2 (Two) Times a Day., Disp: , Rfl:   •  ferrous sulfate 325 (65 FE) MG tablet, Take 325 mg by mouth Daily With  Breakfast., Disp: , Rfl:   •  fluticasone (FLONASE) 50 MCG/ACT nasal spray, 2 sprays into the nostril(s) as directed by provider 2 (Two) Times a Day. Administer 2 sprays in each nostril for each dose. , Disp: , Rfl:   •  furosemide (LASIX) 40 MG tablet, Take 0.5 tablets by mouth 2 (Two) Times a Day. (Patient taking differently: Take 40 mg by mouth 2 (Two) Times a Day.), Disp: , Rfl:   •  glucose blood test strip, Use as instructed, Disp: 50 each, Rfl: 5  •  glucose monitor monitoring kit, As Needed (please provide meter and strips for glucose testing)., Disp: 1 each, Rfl: 0  •  ipratropium-albuterol (DUO-NEB) 0.5-2.5 mg/mL nebulizer, Take 1.5 mL by nebulization 2 (Two) Times a Day. Administer one 3 ML vial 4 times daily via Nebulization as needed for shortness of breath, wheezing, Disp: , Rfl:   •  levothyroxine (SYNTHROID, LEVOTHROID) 50 MCG tablet, 1 Tab PO 2 days per week and 75mcg 5 days week, Disp: 50 tablet, Rfl: 3  •  levothyroxine (SYNTHROID, LEVOTHROID) 75 MCG tablet, 1 po 5 days per week and 50mcg 2 days per week, Disp: 90 tablet, Rfl: 3  •  magnesium oxide (MAGOX) 400 (241.3 Mg) MG tablet tablet, Take 400 mg by mouth Daily., Disp: , Rfl:   •  metoprolol succinate XL (TOPROL-XL) 50 MG 24 hr tablet, Take 50 mg by mouth 2 (Two) Times a Day. Patient takes 25mg in the morning and 25mg in the evening., Disp: , Rfl:   •  Omega-3 Fatty Acids (FISH OIL) 1000 MG capsule capsule, Take 2,000 mg by mouth 2 (Two) Times a Day With Meals., Disp: , Rfl:   •  potassium chloride (K-DUR,KLOR-CON) 10 MEQ CR tablet, Take 2 tablets by mouth 2 (Two) Times a Day. (Patient taking differently: Take 20 mEq by mouth Daily.), Disp: , Rfl: 1    PMH  The following portions of the patient's history were reviewed and updated as appropriate: allergies, current medications, past family history, past medical history, past social history, past surgical history and problem list.    Review of systems  Review of Systems   Constitutional:  Positive for fatigue. Negative for fever.   Respiratory: Positive for cough (after URI), chest tightness, shortness of breath and wheezing.    Cardiovascular: Positive for palpitations and leg swelling.   Musculoskeletal: Positive for gait problem (ambulates with walker).   Neurological: Positive for dizziness, weakness and headaches.   Psychiatric/Behavioral: Positive for confusion and sleep disturbance (insomnia).       Physical exam    Physical Exam   Constitutional: She is oriented to person, place, and time.   Neck: No thyroid mass present.   Pulmonary/Chest: No respiratory distress.   Neurological: She is alert and oriented to person, place, and time.   Psychiatric: She has a normal mood and affect. Thought content normal.         LABS AND IMAGING    Results for orders placed or performed in visit on 05/04/20   Uric Acid   Result Value Ref Range    Uric Acid 12.0 (H) 2.4 - 5.7 mg/dL   TSH   Result Value Ref Range    TSH 3.600 0.270 - 4.200 uIU/mL   Hepatic Function Panel   Result Value Ref Range    Total Protein 7.4 6.0 - 8.5 g/dL    Albumin 4.30 3.50 - 5.20 g/dL    ALT (SGPT) 13 1 - 33 U/L    AST (SGOT) 14 1 - 32 U/L    Alkaline Phosphatase 87 39 - 117 U/L    Total Bilirubin 0.6 0.2 - 1.2 mg/dL    Bilirubin, Direct 0.2 0.2 - 0.3 mg/dL    Bilirubin, Indirect 0.4 mg/dL   Vitamin B12   Result Value Ref Range    Vitamin B-12 1,498 (H) 211 - 946 pg/mL   T4, Free   Result Value Ref Range    Free T4 1.64 0.93 - 1.70 ng/dL   Lipid Panel   Result Value Ref Range    Total Cholesterol 131 0 - 200 mg/dL    Triglycerides 118 0 - 150 mg/dL    HDL Cholesterol 50 40 - 60 mg/dL    LDL Cholesterol  57 0 - 100 mg/dL    VLDL Cholesterol 23.6 5 - 40 mg/dL    LDL/HDL Ratio 1.15    Folate   Result Value Ref Range    Folate >20.00 4.78 - 24.20 ng/mL   Iron Profile   Result Value Ref Range    Iron 108 37 - 145 mcg/dL    Iron Saturation 22 20 - 50 %    Transferrin 329 200 - 360 mg/dL    TIBC 490 298 - 536 mcg/dL   Basic Metabolic  Panel   Result Value Ref Range    Glucose 184 (H) 65 - 99 mg/dL    BUN 46 (H) 8 - 23 mg/dL    Creatinine 1.99 (H) 0.57 - 1.00 mg/dL    Sodium 137 136 - 145 mmol/L    Potassium 4.4 3.5 - 5.2 mmol/L    Chloride 86 (L) 98 - 107 mmol/L    CO2 38.4 (H) 22.0 - 29.0 mmol/L    Calcium 10.2 8.6 - 10.5 mg/dL    eGFR Non African Amer 25 (L) >60 mL/min/1.73    BUN/Creatinine Ratio 23.1 7.0 - 25.0    Anion Gap 12.6 5.0 - 15.0 mmol/L   CBC Auto Differential   Result Value Ref Range    WBC 6.60 3.40 - 10.80 10*3/mm3    RBC 3.12 (L) 3.77 - 5.28 10*6/mm3    Hemoglobin 10.5 (L) 12.0 - 15.9 g/dL    Hematocrit 32.5 (L) 34.0 - 46.6 %    .2 (H) 79.0 - 97.0 fL    MCH 33.7 (H) 26.6 - 33.0 pg    MCHC 32.3 31.5 - 35.7 g/dL    RDW 12.5 12.3 - 15.4 %    RDW-SD 47.5 37.0 - 54.0 fl    MPV 9.6 6.0 - 12.0 fL    Platelets 172 140 - 450 10*3/mm3    Neutrophil % 70.8 42.7 - 76.0 %    Lymphocyte % 19.4 (L) 19.6 - 45.3 %    Monocyte % 7.7 5.0 - 12.0 %    Eosinophil % 1.2 0.3 - 6.2 %    Basophil % 0.6 0.0 - 1.5 %    Immature Grans % 0.3 0.0 - 0.5 %    Neutrophils, Absolute 4.67 1.70 - 7.00 10*3/mm3    Lymphocytes, Absolute 1.28 0.70 - 3.10 10*3/mm3    Monocytes, Absolute 0.51 0.10 - 0.90 10*3/mm3    Eosinophils, Absolute 0.08 0.00 - 0.40 10*3/mm3    Basophils, Absolute 0.04 0.00 - 0.20 10*3/mm3    Immature Grans, Absolute 0.02 0.00 - 0.05 10*3/mm3    nRBC 0.0 0.0 - 0.2 /100 WBC   Vitamin D 25 Hydroxy   Result Value Ref Range    25 Hydroxy, Vitamin D 62.4 30.0 - 100.0 ng/ml   PTH, Intact   Result Value Ref Range    PTH, Intact 228.0 (H) 15.0 - 65.0 pg/mL   Phosphorus   Result Value Ref Range    Phosphorus 3.1 2.5 - 4.5 mg/dL      Lab Results   Component Value Date    TSH 3.600 05/04/2020     CT adrenal showed 4 cm left adrenal nodule.   Assessment  Assessment/Plan   1. Controlled type 2 diabetes mellitus without complication, without long-term current use of insulin (CMS/[facility])    2. Adrenal cortical adenoma, unspecified laterality    3.  Hypothyroidism, adult      No orders of the defined types were placed in this encounter.    Plan     Diabetes mellitus type 2 is well controlled on diet  A1C is 5.     Adrenal left nodule -Reviewed images and report of CT. Adrenal nodule is low density, but slowly growing - 4 cm now. Biochemical evaluation was negative for secreting adenoma.      Hypothyroidism continue levothyroxine 50/75 mcg alternating doses. Continue the same dose, thyroid function is normal.     Follow-up in 3 months.

## 2020-05-08 ENCOUNTER — TELEPHONE (OUTPATIENT)
Dept: ENDOCRINOLOGY | Facility: CLINIC | Age: 65
End: 2020-05-08

## 2020-05-08 RX ORDER — BLOOD-GLUCOSE METER
1 EACH MISCELLANEOUS DAILY
Qty: 1 KIT | Refills: 0 | Status: SHIPPED | OUTPATIENT
Start: 2020-05-08

## 2020-05-08 NOTE — TELEPHONE ENCOUNTER
Delores from [facility], needed to know if pt is using insulin, and verify phone number for patient.  Patient has no insulin listed in her med list, and the only phone number that we have in the chart for pt is the one [facility] pharmacy had.

## 2020-05-08 NOTE — TELEPHONE ENCOUNTER
Pharmacy called and is needing to know if patient is on insulin and if she has received a meter in the last 5 years. Please contact [facility]. Thanks.

## 2020-05-08 NOTE — TELEPHONE ENCOUNTER
rx sent for accu chek guide me kit and strips with clarification of directions and diagnosis code to [facility] per their request

## 2020-06-30 ENCOUNTER — APPOINTMENT (OUTPATIENT)
Dept: MAMMOGRAPHY | Facility: HOSPITAL | Age: 65
End: 2020-06-30

## 2020-07-30 ENCOUNTER — HOSPITAL ENCOUNTER (OUTPATIENT)
Dept: MAMMOGRAPHY | Facility: HOSPITAL | Age: 65
Discharge: HOME | End: 2020-07-30
Admitting: FAMILY MEDICINE

## 2020-07-30 DIAGNOSIS — Z12.31 VISIT FOR SCREENING MAMMOGRAM: ICD-10-CM

## 2020-07-30 PROCEDURE — 77067 SCR MAMMO BI INCL CAD: CPT

## 2020-07-30 PROCEDURE — 77063 BREAST TOMOSYNTHESIS BI: CPT

## 2020-08-05 PROBLEM — N18.4 ANEMIA OF CHRONIC RENAL FAILURE, STAGE 4 (SEVERE) (HCC): Status: ACTIVE | Noted: 2020-08-05

## 2020-08-05 PROBLEM — N18.4 CHRONIC KIDNEY DISEASE, STAGE IV (SEVERE) (HCC): Status: ACTIVE | Noted: 2020-08-05

## 2020-08-05 PROBLEM — D63.1 ANEMIA OF CHRONIC RENAL FAILURE, STAGE 4 (SEVERE) (HCC): Status: ACTIVE | Noted: 2020-08-05

## 2020-08-06 ENCOUNTER — HOSPITAL ENCOUNTER (OUTPATIENT)
Dept: INFUSION THERAPY | Facility: HOSPITAL | Age: 65
Discharge: HOME | End: 2020-08-06
Admitting: PHYSICIAN ASSISTANT

## 2020-08-06 VITALS
WEIGHT: 250 LBS | BODY MASS INDEX: 41.65 KG/M2 | DIASTOLIC BLOOD PRESSURE: 56 MMHG | RESPIRATION RATE: 18 BRPM | HEART RATE: 78 BPM | OXYGEN SATURATION: 96 % | SYSTOLIC BLOOD PRESSURE: 109 MMHG | HEIGHT: 65 IN | TEMPERATURE: 97.8 F

## 2020-08-06 DIAGNOSIS — D63.1 ANEMIA OF CHRONIC RENAL FAILURE, STAGE 4 (SEVERE) (HCC): Primary | ICD-10-CM

## 2020-08-06 DIAGNOSIS — N18.4 ANEMIA OF CHRONIC RENAL FAILURE, STAGE 4 (SEVERE) (HCC): Primary | ICD-10-CM

## 2020-08-06 DIAGNOSIS — N18.4 CHRONIC KIDNEY DISEASE, STAGE IV (SEVERE) (HCC): ICD-10-CM

## 2020-08-06 LAB
DEPRECATED RDW RBC AUTO: 55.3 FL (ref 37–54)
ERYTHROCYTE [DISTWIDTH] IN BLOOD BY AUTOMATED COUNT: 13.7 % (ref 12.3–15.4)
HCT VFR BLD AUTO: 30.5 % (ref 34–46.6)
HGB BLD-MCNC: 10.2 G/DL (ref 12–15.9)
MCH RBC QN AUTO: 36.7 PG (ref 26.6–33)
MCHC RBC AUTO-ENTMCNC: 33.4 G/DL (ref 31.5–35.7)
MCV RBC AUTO: 109.7 FL (ref 79–97)
PLATELET # BLD AUTO: 162 10*3/MM3 (ref 140–450)
PMV BLD AUTO: 8.8 FL (ref 6–12)
RBC # BLD AUTO: 2.78 10*6/MM3 (ref 3.77–5.28)
WBC # BLD AUTO: 7.53 10*3/MM3 (ref 3.4–10.8)

## 2020-08-06 PROCEDURE — G0463 HOSPITAL OUTPT CLINIC VISIT: HCPCS

## 2020-08-06 PROCEDURE — 36415 COLL VENOUS BLD VENIPUNCTURE: CPT

## 2020-08-06 PROCEDURE — 85027 COMPLETE CBC AUTOMATED: CPT | Performed by: PHYSICIAN ASSISTANT

## 2020-08-20 ENCOUNTER — HOSPITAL ENCOUNTER (OUTPATIENT)
Dept: INFUSION THERAPY | Facility: HOSPITAL | Age: 65
Discharge: HOME | End: 2020-08-20
Admitting: PHYSICIAN ASSISTANT

## 2020-08-20 VITALS
OXYGEN SATURATION: 100 % | TEMPERATURE: 98.4 F | DIASTOLIC BLOOD PRESSURE: 56 MMHG | HEART RATE: 73 BPM | SYSTOLIC BLOOD PRESSURE: 115 MMHG | RESPIRATION RATE: 18 BRPM

## 2020-08-20 DIAGNOSIS — N18.4 ANEMIA OF CHRONIC RENAL FAILURE, STAGE 4 (SEVERE) (HCC): Primary | ICD-10-CM

## 2020-08-20 DIAGNOSIS — D63.1 ANEMIA OF CHRONIC RENAL FAILURE, STAGE 4 (SEVERE) (HCC): Primary | ICD-10-CM

## 2020-08-20 DIAGNOSIS — N18.4 CHRONIC KIDNEY DISEASE, STAGE IV (SEVERE) (HCC): ICD-10-CM

## 2020-08-20 LAB
DEPRECATED RDW RBC AUTO: 55.1 FL (ref 37–54)
ERYTHROCYTE [DISTWIDTH] IN BLOOD BY AUTOMATED COUNT: 14 % (ref 12.3–15.4)
HCT VFR BLD AUTO: 29.6 % (ref 34–46.6)
HGB BLD-MCNC: 10 G/DL (ref 12–15.9)
MCH RBC QN AUTO: 36.9 PG (ref 26.6–33)
MCHC RBC AUTO-ENTMCNC: 33.8 G/DL (ref 31.5–35.7)
MCV RBC AUTO: 109.2 FL (ref 79–97)
PLATELET # BLD AUTO: 188 10*3/MM3 (ref 140–450)
PMV BLD AUTO: 9 FL (ref 6–12)
RBC # BLD AUTO: 2.71 10*6/MM3 (ref 3.77–5.28)
WBC # BLD AUTO: 7.01 10*3/MM3 (ref 3.4–10.8)

## 2020-08-20 PROCEDURE — 96372 THER/PROPH/DIAG INJ SC/IM: CPT

## 2020-08-20 PROCEDURE — 85027 COMPLETE CBC AUTOMATED: CPT | Performed by: INTERNAL MEDICINE

## 2020-08-20 PROCEDURE — 36415 COLL VENOUS BLD VENIPUNCTURE: CPT

## 2020-08-20 PROCEDURE — 25010000002 EPOETIN ALFA PER 1000 UNITS: Performed by: PHYSICIAN ASSISTANT

## 2020-08-20 RX ADMIN — ERYTHROPOIETIN 40000 UNITS: 40000 INJECTION, SOLUTION INTRAVENOUS; SUBCUTANEOUS at 14:20

## 2020-09-16 ENCOUNTER — TELEPHONE (OUTPATIENT)
Dept: INFUSION THERAPY | Facility: HOSPITAL | Age: 65
End: 2020-09-16

## 2020-09-17 ENCOUNTER — HOSPITAL ENCOUNTER (OUTPATIENT)
Dept: INFUSION THERAPY | Facility: HOSPITAL | Age: 65
Discharge: HOME | End: 2020-09-17
Admitting: PHYSICIAN ASSISTANT

## 2020-09-17 VITALS
HEART RATE: 74 BPM | OXYGEN SATURATION: 94 % | RESPIRATION RATE: 18 BRPM | TEMPERATURE: 97.3 F | DIASTOLIC BLOOD PRESSURE: 50 MMHG | SYSTOLIC BLOOD PRESSURE: 120 MMHG

## 2020-09-17 DIAGNOSIS — D63.1 ANEMIA OF CHRONIC RENAL FAILURE, STAGE 4 (SEVERE) (HCC): Primary | ICD-10-CM

## 2020-09-17 DIAGNOSIS — N18.4 CHRONIC KIDNEY DISEASE, STAGE IV (SEVERE) (HCC): ICD-10-CM

## 2020-09-17 DIAGNOSIS — N18.4 ANEMIA OF CHRONIC RENAL FAILURE, STAGE 4 (SEVERE) (HCC): Primary | ICD-10-CM

## 2020-09-17 LAB
DEPRECATED RDW RBC AUTO: 50.1 FL (ref 37–54)
ERYTHROCYTE [DISTWIDTH] IN BLOOD BY AUTOMATED COUNT: 12.6 % (ref 12.3–15.4)
HCT VFR BLD AUTO: 30.9 % (ref 34–46.6)
HGB BLD-MCNC: 10.4 G/DL (ref 12–15.9)
MCH RBC QN AUTO: 36.4 PG (ref 26.6–33)
MCHC RBC AUTO-ENTMCNC: 33.7 G/DL (ref 31.5–35.7)
MCV RBC AUTO: 108 FL (ref 79–97)
PLATELET # BLD AUTO: 166 10*3/MM3 (ref 140–450)
PMV BLD AUTO: 8.8 FL (ref 6–12)
RBC # BLD AUTO: 2.86 10*6/MM3 (ref 3.77–5.28)
WBC # BLD AUTO: 6.23 10*3/MM3 (ref 3.4–10.8)

## 2020-09-17 PROCEDURE — 36415 COLL VENOUS BLD VENIPUNCTURE: CPT

## 2020-09-17 PROCEDURE — G0463 HOSPITAL OUTPT CLINIC VISIT: HCPCS

## 2020-09-17 PROCEDURE — 85027 COMPLETE CBC AUTOMATED: CPT | Performed by: PHYSICIAN ASSISTANT

## 2020-09-17 RX ORDER — FUROSEMIDE 40 MG/1
40 TABLET ORAL 2 TIMES DAILY
COMMUNITY

## 2020-09-17 RX ORDER — METOPROLOL TARTRATE 50 MG/1
50 TABLET, FILM COATED ORAL 2 TIMES DAILY
COMMUNITY
End: 2020-09-29

## 2020-09-17 RX ORDER — AMOXICILLIN AND CLAVULANATE POTASSIUM 875; 125 MG/1; MG/1
1 TABLET, FILM COATED ORAL 2 TIMES DAILY
COMMUNITY

## 2020-09-17 RX ORDER — FERROUS SULFATE 325(65) MG
325 TABLET ORAL
COMMUNITY
End: 2021-01-08 | Stop reason: SDUPTHER

## 2020-09-17 RX ORDER — POTASSIUM CHLORIDE 750 MG/1
10 TABLET, FILM COATED, EXTENDED RELEASE ORAL 2 TIMES DAILY
COMMUNITY
End: 2021-01-08 | Stop reason: SDUPTHER

## 2020-09-29 ENCOUNTER — LAB (OUTPATIENT)
Dept: LAB | Facility: HOSPITAL | Age: 65
End: 2020-09-29

## 2020-09-29 ENCOUNTER — OFFICE VISIT (OUTPATIENT)
Dept: ENDOCRINOLOGY | Facility: CLINIC | Age: 65
End: 2020-09-29

## 2020-09-29 VITALS
HEART RATE: 69 BPM | SYSTOLIC BLOOD PRESSURE: 110 MMHG | DIASTOLIC BLOOD PRESSURE: 78 MMHG | BODY MASS INDEX: 40.35 KG/M2 | OXYGEN SATURATION: 96 % | WEIGHT: 242.2 LBS | HEIGHT: 65 IN

## 2020-09-29 DIAGNOSIS — R60.0 LOCALIZED EDEMA: ICD-10-CM

## 2020-09-29 DIAGNOSIS — E03.9 HYPOTHYROIDISM, ADULT: ICD-10-CM

## 2020-09-29 DIAGNOSIS — E11.65 UNCONTROLLED TYPE 2 DIABETES MELLITUS WITH HYPERGLYCEMIA (HCC): Primary | ICD-10-CM

## 2020-09-29 DIAGNOSIS — D35.00 ADRENAL CORTICAL ADENOMA, UNSPECIFIED LATERALITY: ICD-10-CM

## 2020-09-29 LAB
ALBUMIN SERPL-MCNC: 4.5 G/DL (ref 3.5–5.2)
ALBUMIN/GLOB SERPL: 1.7 G/DL
ALP SERPL-CCNC: 91 U/L (ref 39–117)
ALT SERPL W P-5'-P-CCNC: 14 U/L (ref 1–33)
ANION GAP SERPL CALCULATED.3IONS-SCNC: 9.5 MMOL/L (ref 5–15)
AST SERPL-CCNC: 14 U/L (ref 1–32)
BILIRUB SERPL-MCNC: 0.8 MG/DL (ref 0–1.2)
BUN SERPL-MCNC: 54 MG/DL (ref 8–23)
BUN/CREAT SERPL: 22.5 (ref 7–25)
CALCIUM SPEC-SCNC: 11 MG/DL (ref 8.6–10.5)
CHLORIDE SERPL-SCNC: 81 MMOL/L (ref 98–107)
CO2 SERPL-SCNC: 44.5 MMOL/L (ref 22–29)
CREAT SERPL-MCNC: 2.4 MG/DL (ref 0.57–1)
EXPIRATION DATE: ABNORMAL
EXPIRATION DATE: NORMAL
GFR SERPL CREATININE-BSD FRML MDRD: 20 ML/MIN/1.73
GLOBULIN UR ELPH-MCNC: 2.7 GM/DL
GLUCOSE BLDC GLUCOMTR-MCNC: 298 MG/DL (ref 70–130)
GLUCOSE SERPL-MCNC: 293 MG/DL (ref 65–99)
HBA1C MFR BLD: 8 %
Lab: ABNORMAL
Lab: NORMAL
POTASSIUM SERPL-SCNC: 3.4 MMOL/L (ref 3.5–5.2)
PROT SERPL-MCNC: 7.2 G/DL (ref 6–8.5)
SODIUM SERPL-SCNC: 135 MMOL/L (ref 136–145)
T4 FREE SERPL-MCNC: 1.63 NG/DL (ref 0.93–1.7)
TSH SERPL DL<=0.05 MIU/L-ACNC: 3.55 UIU/ML (ref 0.27–4.2)

## 2020-09-29 PROCEDURE — 84443 ASSAY THYROID STIM HORMONE: CPT | Performed by: INTERNAL MEDICINE

## 2020-09-29 PROCEDURE — 80053 COMPREHEN METABOLIC PANEL: CPT | Performed by: INTERNAL MEDICINE

## 2020-09-29 PROCEDURE — 83036 HEMOGLOBIN GLYCOSYLATED A1C: CPT | Performed by: INTERNAL MEDICINE

## 2020-09-29 PROCEDURE — 99214 OFFICE O/P EST MOD 30 MIN: CPT | Performed by: INTERNAL MEDICINE

## 2020-09-29 PROCEDURE — 84439 ASSAY OF FREE THYROXINE: CPT | Performed by: INTERNAL MEDICINE

## 2020-09-29 PROCEDURE — 82947 ASSAY GLUCOSE BLOOD QUANT: CPT | Performed by: INTERNAL MEDICINE

## 2020-09-29 NOTE — PROGRESS NOTES
Chief complaint  Diabetes (Does not know how blood sugars are doing, she has not been checking )    Subjective   Anny Santos is a 65 y.o. female is here today for follow-up.  Follow-up for adrenal tumor. Patient has adrenal incidentaloma found on CT scan ordered for pulmonary nodules on dec 2012. Repeat CT scan  showed unchanged 3.5 cm adrenal adenoma with benign radiologic characteristics - low density.   CT adrenal 10/21/2016 - Stable 3.7 cm low-density left adrenal nodule. Hounsfield units less than 10.   2018 CT adrenals 4 cm adrenal adenoma with low Hounsfield units.   She had completed CT in [facility] and we requested the records.       Patient has multiple medical problems, including hypertension, hyperlipidemia, asthma with COPD, O-2 dependent, SVT, impaired glucose tolerance, uncontrolled hypertension and worsening edema.     Diabetes mellitus type 2 Her diabetes is uncontrolled. She is not testing and not follows the diet.  She didn't test regaular    Hypothyroidism - started levothyroxine in 10/2018. Increased the dose to 75 mcg daily and noticed insomnia and increased tremors, last visit we reduced to alternating doses 50/75 mcg . She is feeling better on a current dose .    C.o SOB and swelling, takes lasix twice a day and metolazone. Sleeping pattern is abnormal - sleeps several times a day for several hours. C/o stomach pains when takes levothyroxine in the morning.       Medications    Current Outpatient Medications:   •  atorvastatin (LIPITOR) 40 MG tablet, Take 40 mg by mouth Every Night., Disp: , Rfl:   •  Blood Glucose Monitoring Suppl (ACCU-CHEK GUIDE ME) w/Device kit, 1 Device Daily. Use daily to test blood sugars, Disp: 1 kit, Rfl: 0  •  coenzyme Q10 100 MG capsule, Take 200 mg by mouth Daily., Disp: , Rfl:   •  Cyanocobalamin (VITAMIN B-12 PO), Take 1,000 mcg by mouth Daily., Disp: , Rfl:   •  ELIQUIS 5 MG tablet tablet, Take 5 mg by mouth Every 12 (Twelve) Hours., Disp: , Rfl:    •  epoetin rebecca (Procrit) 96187 UNIT/ML injection, Inject 40,000 Units under the skin into the appropriate area as directed Every 14 (Fourteen) Days., Disp: , Rfl:   •  fluticasone (FLONASE) 50 MCG/ACT nasal spray, 2 sprays into the nostril(s) as directed by provider 2 (Two) Times a Day. Administer 2 sprays in each nostril for each dose. , Disp: , Rfl:   •  furosemide (LASIX) 40 MG tablet, Take 40 mg by mouth 2 (Two) Times a Day., Disp: , Rfl:   •  glucose blood (Accu-Chek Guide) test strip, Test blood sugars once per day, Disp: 100 each, Rfl: 5  •  glucose monitor monitoring kit, As Needed (please provide meter and strips for glucose testing)., Disp: 1 each, Rfl: 0  •  ipratropium-albuterol (DUO-NEB) 0.5-2.5 mg/mL nebulizer, Take 1.5 mL by nebulization 2 (Two) Times a Day. Administer one 3 ML vial 4 times daily via Nebulization as needed for shortness of breath, wheezing, Disp: , Rfl:   •  levothyroxine (SYNTHROID, LEVOTHROID) 50 MCG tablet, 1 Tab PO 2 days per week and 75mcg 5 days week, Disp: 50 tablet, Rfl: 3  •  levothyroxine (SYNTHROID, LEVOTHROID) 75 MCG tablet, 1 po 5 days per week and 50mcg 2 days per week, Disp: 90 tablet, Rfl: 3  •  magnesium oxide (MAGOX) 400 (241.3 Mg) MG tablet tablet, Take 400 mg by mouth Daily., Disp: , Rfl:   •  Omega-3 Fatty Acids (FISH OIL) 1000 MG capsule capsule, Take 2,000 mg by mouth 2 (Two) Times a Day With Meals., Disp: , Rfl:   •  potassium chloride 10 MEQ CR tablet, Take 10 mEq by mouth 2 (Two) Times a Day., Disp: , Rfl:   •  amiodarone (PACERONE) 200 MG tablet, Take 200 mg by mouth Daily., Disp: , Rfl:   •  amoxicillin-clavulanate (AUGMENTIN) 875-125 MG per tablet, Take 1 tablet by mouth 2 (Two) Times a Day., Disp: , Rfl:   •  cholecalciferol (VITAMIN D3) 25 MCG (1000 UT) tablet, Take 1,000 Units by mouth Daily., Disp: , Rfl:   •  famotidine (PEPCID) 20 MG tablet, Take 20 mg by mouth 2 (Two) Times a Day., Disp: , Rfl:   •  ferrous sulfate 325 (65 FE) MG tablet, Take 325  "mg by mouth Daily With Breakfast., Disp: , Rfl:   •  ferrous sulfate 325 (65 FE) MG tablet, Take 325 mg by mouth Daily With Breakfast., Disp: , Rfl:   •  furosemide (LASIX) 40 MG tablet, Take 0.5 tablets by mouth 2 (Two) Times a Day. (Patient taking differently: Take 40 mg by mouth 2 (Two) Times a Day.), Disp: , Rfl:   •  metoprolol succinate XL (TOPROL-XL) 50 MG 24 hr tablet, Take 50 mg by mouth 2 (Two) Times a Day. Patient takes 25mg in the morning and 25mg in the evening., Disp: , Rfl:   •  potassium chloride (K-DUR,KLOR-CON) 10 MEQ CR tablet, Take 2 tablets by mouth 2 (Two) Times a Day. (Patient taking differently: Take 20 mEq by mouth Daily.), Disp: , Rfl: 1    PMH  The following portions of the patient's history were reviewed and updated as appropriate: allergies, current medications, past family history, past medical history, past social history, past surgical history and problem list.    Review of systems  Review of Systems   Constitutional: Positive for fatigue. Negative for fever.   Respiratory: Positive for chest tightness, shortness of breath and wheezing.    Cardiovascular: Positive for palpitations and leg swelling.   Musculoskeletal: Positive for gait problem (ambulates with walker).   Neurological: Positive for dizziness, weakness and headaches.   Psychiatric/Behavioral: Positive for confusion and sleep disturbance (insomnia).       Physical exam  /78   Pulse 69   Ht 165.1 cm (65\")   Wt 110 kg (242 lb 3.2 oz)   SpO2 96%   BMI 40.30 kg/m²   Physical Exam   Constitutional: She is oriented to person, place, and time.   Neck: No thyroid mass present.   Cardiovascular: Normal rate, regular rhythm, normal heart sounds and normal pulses.   Pulmonary/Chest: Effort normal. No respiratory distress. She has no wheezes. She has no rhonchi.   Musculoskeletal: Swelling (1+) present.   Neurological: She is alert and oriented to person, place, and time.   Psychiatric: Thought content normal.         LABS " AND IMAGING    Results for orders placed or performed in visit on 09/29/20   POC Glucose, Blood    Specimen: Blood   Result Value Ref Range    Glucose 298 (A) 70 - 130 mg/dL    Lot Number [id number]     Expiration Date 03/31/2020    POC Glycosylated Hemoglobin (Hb A1C)    Specimen: Blood   Result Value Ref Range    Hemoglobin A1C 8.0 %    Lot Number [id number]     Expiration Date 03/23/2022       Lab Results   Component Value Date    TSH 3.600 05/04/2020     CT adrenal showed 4 cm left adrenal nodule.   Assessment  Assessment/Plan   1. Controlled type 2 diabetes mellitus with hyperglycemia, without long-term current use of insulin (CMS/[facility])    2. Adrenal cortical adenoma, unspecified laterality    3. Hypothyroidism, adult    4. Localized edema      Orders Placed This Encounter   Procedures   • POC Glucose, Blood   • POC Glycosylated Hemoglobin (Hb A1C)     Plan     Diabetes mellitus type 2 is uncontrolled. A1C is increased significantly for her.   - metformin is contraindicated due to renal insufficiency. Sulfonylurea is high risk for hypoglycemia and falls.   -I have given her sample of tradjenta.   -encouraged to test more often and dietary modifications reviewed.   Patient has a chronic kidney disease and it limits to the choice of medications    Adrenal left nodule -Reviewed images and report of CT. Adrenal nodule is low density, but slowly growing - 4 cm on last check.. Biochemical evaluation was negative for secreting adenoma.    - repeat adrenal levels.   - request the CT results from [facility].     Hypothyroidism continue levothyroxine 50/75 mcg alternating doses. Continue the same dose, thyroid function is normal.     Follow-up in 3 months.

## 2020-10-05 ENCOUNTER — TELEPHONE (OUTPATIENT)
Dept: ENDOCRINOLOGY | Facility: CLINIC | Age: 65
End: 2020-10-05

## 2020-10-05 NOTE — TELEPHONE ENCOUNTER
PT CALLED TO SAY THAT SHE EMAILED HER BLOOD SUGARS TO YOU THROUGH MY CHART    PTS NUMBER [phone]

## 2020-10-06 ENCOUNTER — TELEPHONE (OUTPATIENT)
Dept: ENDOCRINOLOGY | Facility: CLINIC | Age: 65
End: 2020-10-06

## 2020-10-06 ENCOUNTER — PATIENT MESSAGE (OUTPATIENT)
Dept: ENDOCRINOLOGY | Facility: CLINIC | Age: 65
End: 2020-10-06

## 2020-10-06 RX ORDER — INSULIN GLARGINE 300 U/ML
20 INJECTION, SOLUTION SUBCUTANEOUS NIGHTLY
Qty: 3 PEN | Refills: 6 | Status: SHIPPED | OUTPATIENT
Start: 2020-10-06

## 2020-10-06 NOTE — TELEPHONE ENCOUNTER
I REVIEWED glucose numbers and they are very high. tradjenta is mild medicine and not improving n umbers when they are going up to 300-500.  We need to start basal insulin. INsulin is safe in kidney disease and will help with numbers. I can send rx to her pharmacy or have her come for instructions and  Training on insulin and we can give a sample to start then. Please let me know when she can come this week. We have diabetes educator on wednesdays and she may benefit from dietary recs as well. Her meals are mainly carbs and adjusting those would help to improve glucose as well.

## 2020-10-06 NOTE — TELEPHONE ENCOUNTER
Attempted contact. Left detailed message on patient machine asking to return call. To schedule time for Insulin training.

## 2020-10-06 NOTE — TELEPHONE ENCOUNTER
We can give her a sample of Tresiba (U-100) or toujeo. Start at 8 UNits nightly and titrate up by 2 units every 3 days until morning numbers are < 150. I will send rx for one of basal insulins, she can change to it after her samples are over. Rx will say 20 units, but she should start with 8-10 and slowly titrate up for the goal of glucose.

## 2020-10-06 NOTE — TELEPHONE ENCOUNTER
Called and spoke to patient, she states that she would like to have samples placed upfront as well as carb counting education material. She states that she will also want to have a prescription sent to [facility]. 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 Please send prescription and let us know what type of insulin so we can place samples upfront for her to  along with carb counting educational materials.

## 2020-10-07 ENCOUNTER — OFFICE VISIT (OUTPATIENT)
Dept: DIABETES SERVICES | Facility: HOSPITAL | Age: 65
End: 2020-10-07

## 2020-10-07 ENCOUNTER — TELEPHONE (OUTPATIENT)
Dept: ENDOCRINOLOGY | Facility: CLINIC | Age: 65
End: 2020-10-07

## 2020-10-07 DIAGNOSIS — E11.9 CONTROLLED TYPE 2 DIABETES MELLITUS WITHOUT COMPLICATION, WITHOUT LONG-TERM CURRENT USE OF INSULIN (HCC): Primary | ICD-10-CM

## 2020-10-07 PROCEDURE — G0108 DIAB MANAGE TRN  PER INDIV: HCPCS | Performed by: REGISTERED NURSE

## 2020-10-07 NOTE — TELEPHONE ENCOUNTER
I accidentally sent this to Dr Zuñiga.  Please see request for pen needles     I picked up insulin, but there's no needles. [facility] said they had to have a separate prescription for the needles. Please call them in. Thank you so much. I will see you all tomorrow (Wednesday) afternoon. Adia

## 2020-10-09 ENCOUNTER — LAB (OUTPATIENT)
Dept: LAB | Facility: HOSPITAL | Age: 65
End: 2020-10-09

## 2020-10-09 ENCOUNTER — TRANSCRIBE ORDERS (OUTPATIENT)
Dept: LAB | Facility: HOSPITAL | Age: 65
End: 2020-10-09

## 2020-10-09 DIAGNOSIS — N18.4 CHRONIC KIDNEY DISEASE, STAGE IV (SEVERE) (HCC): Primary | ICD-10-CM

## 2020-10-09 DIAGNOSIS — N18.4 CHRONIC KIDNEY DISEASE, STAGE IV (SEVERE) (HCC): ICD-10-CM

## 2020-10-09 LAB
ALBUMIN SERPL-MCNC: 3.9 G/DL (ref 3.5–5.2)
ANION GAP SERPL CALCULATED.3IONS-SCNC: 6.9 MMOL/L (ref 5–15)
BUN SERPL-MCNC: 35 MG/DL (ref 8–23)
BUN/CREAT SERPL: 17.2 (ref 7–25)
CALCIUM SPEC-SCNC: 9.8 MG/DL (ref 8.6–10.5)
CHLORIDE SERPL-SCNC: 90 MMOL/L (ref 98–107)
CO2 SERPL-SCNC: 36.1 MMOL/L (ref 22–29)
CREAT SERPL-MCNC: 2.03 MG/DL (ref 0.57–1)
GFR SERPL CREATININE-BSD FRML MDRD: 24 ML/MIN/1.73
GLUCOSE SERPL-MCNC: 228 MG/DL (ref 65–99)
PHOSPHATE SERPL-MCNC: 2.2 MG/DL (ref 2.5–4.5)
POTASSIUM SERPL-SCNC: 4.2 MMOL/L (ref 3.5–5.2)
SODIUM SERPL-SCNC: 133 MMOL/L (ref 136–145)
URATE SERPL-MCNC: 8 MG/DL (ref 2.4–5.7)

## 2020-10-09 PROCEDURE — 84550 ASSAY OF BLOOD/URIC ACID: CPT

## 2020-10-09 PROCEDURE — 36415 COLL VENOUS BLD VENIPUNCTURE: CPT

## 2020-10-09 PROCEDURE — 80069 RENAL FUNCTION PANEL: CPT

## 2020-10-14 ENCOUNTER — TELEPHONE (OUTPATIENT)
Dept: INFUSION THERAPY | Facility: HOSPITAL | Age: 65
End: 2020-10-14

## 2020-10-15 ENCOUNTER — HOSPITAL ENCOUNTER (OUTPATIENT)
Dept: INFUSION THERAPY | Facility: HOSPITAL | Age: 65
Discharge: HOME | End: 2020-10-15
Admitting: INTERNAL MEDICINE

## 2020-10-15 VITALS
SYSTOLIC BLOOD PRESSURE: 121 MMHG | TEMPERATURE: 98.4 F | OXYGEN SATURATION: 92 % | RESPIRATION RATE: 18 BRPM | HEART RATE: 74 BPM | DIASTOLIC BLOOD PRESSURE: 59 MMHG

## 2020-10-15 DIAGNOSIS — N18.4 ANEMIA OF CHRONIC RENAL FAILURE, STAGE 4 (SEVERE) (HCC): Primary | ICD-10-CM

## 2020-10-15 DIAGNOSIS — N18.4 CHRONIC KIDNEY DISEASE, STAGE IV (SEVERE) (HCC): ICD-10-CM

## 2020-10-15 DIAGNOSIS — D63.1 ANEMIA OF CHRONIC RENAL FAILURE, STAGE 4 (SEVERE) (HCC): Primary | ICD-10-CM

## 2020-10-15 LAB
DEPRECATED RDW RBC AUTO: 49.2 FL (ref 37–54)
ERYTHROCYTE [DISTWIDTH] IN BLOOD BY AUTOMATED COUNT: 12.8 % (ref 12.3–15.4)
HCT VFR BLD AUTO: 31.2 % (ref 34–46.6)
HGB BLD-MCNC: 10.8 G/DL (ref 12–15.9)
MCH RBC QN AUTO: 36.7 PG (ref 26.6–33)
MCHC RBC AUTO-ENTMCNC: 34.6 G/DL (ref 31.5–35.7)
MCV RBC AUTO: 106.1 FL (ref 79–97)
PLATELET # BLD AUTO: 149 10*3/MM3 (ref 140–450)
PMV BLD AUTO: 9 FL (ref 6–12)
RBC # BLD AUTO: 2.94 10*6/MM3 (ref 3.77–5.28)
WBC # BLD AUTO: 7.41 10*3/MM3 (ref 3.4–10.8)

## 2020-10-15 PROCEDURE — 85027 COMPLETE CBC AUTOMATED: CPT | Performed by: INTERNAL MEDICINE

## 2020-10-15 PROCEDURE — 36415 COLL VENOUS BLD VENIPUNCTURE: CPT

## 2020-10-15 PROCEDURE — G0463 HOSPITAL OUTPT CLINIC VISIT: HCPCS

## 2020-11-12 ENCOUNTER — APPOINTMENT (OUTPATIENT)
Dept: INFUSION THERAPY | Facility: HOSPITAL | Age: 65
End: 2020-11-12

## 2020-11-29 DIAGNOSIS — E03.9 HYPOTHYROIDISM, ADULT: Primary | ICD-10-CM

## 2020-12-04 ENCOUNTER — HOSPITAL ENCOUNTER (OUTPATIENT)
Dept: INFUSION THERAPY | Facility: HOSPITAL | Age: 65
Discharge: HOME | End: 2020-12-04
Admitting: NURSE PRACTITIONER

## 2020-12-04 VITALS
TEMPERATURE: 97.8 F | OXYGEN SATURATION: 94 % | DIASTOLIC BLOOD PRESSURE: 48 MMHG | SYSTOLIC BLOOD PRESSURE: 121 MMHG | HEART RATE: 68 BPM

## 2020-12-04 DIAGNOSIS — N18.4 ANEMIA OF CHRONIC RENAL FAILURE, STAGE 4 (SEVERE) (HCC): Primary | ICD-10-CM

## 2020-12-04 DIAGNOSIS — D63.1 ANEMIA OF CHRONIC RENAL FAILURE, STAGE 4 (SEVERE) (HCC): Primary | ICD-10-CM

## 2020-12-04 DIAGNOSIS — N18.4 CHRONIC KIDNEY DISEASE, STAGE IV (SEVERE) (HCC): ICD-10-CM

## 2020-12-04 LAB
ANISOCYTOSIS BLD QL: NORMAL
BASOPHILS # BLD AUTO: 0.03 10*3/MM3 (ref 0–0.2)
BASOPHILS NFR BLD AUTO: 0.4 % (ref 0–1.5)
DEPRECATED RDW RBC AUTO: 66.5 FL (ref 37–54)
EOSINOPHIL # BLD AUTO: 0.09 10*3/MM3 (ref 0–0.4)
EOSINOPHIL NFR BLD AUTO: 1.2 % (ref 0.3–6.2)
ERYTHROCYTE [DISTWIDTH] IN BLOOD BY AUTOMATED COUNT: 16.3 % (ref 12.3–15.4)
HCT VFR BLD AUTO: 27.5 % (ref 34–46.6)
HGB BLD-MCNC: 8.4 G/DL (ref 12–15.9)
IMM GRANULOCYTES # BLD AUTO: 0.05 10*3/MM3 (ref 0–0.05)
IMM GRANULOCYTES NFR BLD AUTO: 0.7 % (ref 0–0.5)
LYMPHOCYTES # BLD AUTO: 0.95 10*3/MM3 (ref 0.7–3.1)
LYMPHOCYTES NFR BLD AUTO: 12.4 % (ref 19.6–45.3)
MACROCYTES BLD QL SMEAR: NORMAL
MCH RBC QN AUTO: 33.9 PG (ref 26.6–33)
MCHC RBC AUTO-ENTMCNC: 30.5 G/DL (ref 31.5–35.7)
MCV RBC AUTO: 110.9 FL (ref 79–97)
MONOCYTES # BLD AUTO: 0.46 10*3/MM3 (ref 0.1–0.9)
MONOCYTES NFR BLD AUTO: 6 % (ref 5–12)
NEUTROPHILS NFR BLD AUTO: 6.11 10*3/MM3 (ref 1.7–7)
NEUTROPHILS NFR BLD AUTO: 79.3 % (ref 42.7–76)
NRBC BLD AUTO-RTO: 0 /100 WBC (ref 0–0.2)
PLATELET # BLD AUTO: 199 10*3/MM3 (ref 140–450)
PMV BLD AUTO: 8.7 FL (ref 6–12)
RBC # BLD AUTO: 2.48 10*6/MM3 (ref 3.77–5.28)
SMALL PLATELETS BLD QL SMEAR: ADEQUATE
WBC # BLD AUTO: 7.69 10*3/MM3 (ref 3.4–10.8)
WBC MORPH BLD: NORMAL

## 2020-12-04 PROCEDURE — 25010000002 EPOETIN ALFA PER 1000 UNITS: Performed by: NURSE PRACTITIONER

## 2020-12-04 PROCEDURE — 85025 COMPLETE CBC W/AUTO DIFF WBC: CPT | Performed by: INTERNAL MEDICINE

## 2020-12-04 PROCEDURE — 96372 THER/PROPH/DIAG INJ SC/IM: CPT

## 2020-12-04 PROCEDURE — 36415 COLL VENOUS BLD VENIPUNCTURE: CPT

## 2020-12-04 PROCEDURE — 85007 BL SMEAR W/DIFF WBC COUNT: CPT | Performed by: INTERNAL MEDICINE

## 2020-12-04 RX ADMIN — ERYTHROPOIETIN 40000 UNITS: 40000 INJECTION, SOLUTION INTRAVENOUS; SUBCUTANEOUS at 13:37

## 2021-01-04 ENCOUNTER — APPOINTMENT (OUTPATIENT)
Dept: INFUSION THERAPY | Facility: HOSPITAL | Age: 66
End: 2021-01-04

## 2021-01-08 ENCOUNTER — HOSPITAL ENCOUNTER (OUTPATIENT)
Dept: INFUSION THERAPY | Facility: HOSPITAL | Age: 66
Discharge: HOME | End: 2021-01-08
Admitting: INTERNAL MEDICINE

## 2021-01-08 VITALS
DIASTOLIC BLOOD PRESSURE: 62 MMHG | WEIGHT: 256 LBS | TEMPERATURE: 98.2 F | SYSTOLIC BLOOD PRESSURE: 122 MMHG | RESPIRATION RATE: 20 BRPM | HEART RATE: 76 BPM | OXYGEN SATURATION: 99 % | BODY MASS INDEX: 42.6 KG/M2

## 2021-01-08 DIAGNOSIS — N18.4 ANEMIA OF CHRONIC RENAL FAILURE, STAGE 4 (SEVERE) (HCC): ICD-10-CM

## 2021-01-08 DIAGNOSIS — D63.1 ANEMIA OF CHRONIC RENAL FAILURE, STAGE 4 (SEVERE) (HCC): ICD-10-CM

## 2021-01-08 DIAGNOSIS — N18.4 CHRONIC KIDNEY DISEASE, STAGE IV (SEVERE) (HCC): ICD-10-CM

## 2021-01-08 DIAGNOSIS — E03.9 HYPOTHYROIDISM, ADULT: Primary | ICD-10-CM

## 2021-01-08 LAB
25(OH)D3 SERPL-MCNC: 25.8 NG/ML (ref 30–100)
ALBUMIN SERPL-MCNC: 3.9 G/DL (ref 3.5–5.2)
ANION GAP SERPL CALCULATED.3IONS-SCNC: 8 MMOL/L (ref 5–15)
BUN SERPL-MCNC: 23 MG/DL (ref 8–23)
BUN/CREAT SERPL: 16.1 (ref 7–25)
CALCIUM SPEC-SCNC: 9.6 MG/DL (ref 8.6–10.5)
CALCIUM SPEC-SCNC: 9.7 MG/DL (ref 8.6–10.5)
CHLORIDE SERPL-SCNC: 99 MMOL/L (ref 98–107)
CO2 SERPL-SCNC: 34 MMOL/L (ref 22–29)
CREAT SERPL-MCNC: 1.43 MG/DL (ref 0.57–1)
DEPRECATED RDW RBC AUTO: 54.8 FL (ref 37–54)
ERYTHROCYTE [DISTWIDTH] IN BLOOD BY AUTOMATED COUNT: 13.8 % (ref 12.3–15.4)
FERRITIN SERPL-MCNC: 43.82 NG/ML (ref 13–150)
GFR SERPL CREATININE-BSD FRML MDRD: 37 ML/MIN/1.73
GLUCOSE SERPL-MCNC: 110 MG/DL (ref 65–99)
HCT VFR BLD AUTO: 33.3 % (ref 34–46.6)
HGB BLD-MCNC: 10.7 G/DL (ref 12–15.9)
IRON 24H UR-MRATE: 103 MCG/DL (ref 37–145)
IRON SATN MFR SERPL: 23 % (ref 20–50)
MAGNESIUM SERPL-MCNC: 2 MG/DL (ref 1.6–2.4)
MCH RBC QN AUTO: 34.6 PG (ref 26.6–33)
MCHC RBC AUTO-ENTMCNC: 32.1 G/DL (ref 31.5–35.7)
MCV RBC AUTO: 107.8 FL (ref 79–97)
PHOSPHATE SERPL-MCNC: 3 MG/DL (ref 2.5–4.5)
PLATELET # BLD AUTO: 151 10*3/MM3 (ref 140–450)
PMV BLD AUTO: 9 FL (ref 6–12)
POTASSIUM SERPL-SCNC: 4.1 MMOL/L (ref 3.5–5.2)
PTH-INTACT SERPL-MCNC: 153 PG/ML (ref 15–65)
RBC # BLD AUTO: 3.09 10*6/MM3 (ref 3.77–5.28)
RETICS # AUTO: 0.08 10*6/MM3 (ref 0.02–0.13)
RETICS/RBC NFR AUTO: 2.66 % (ref 0.7–1.9)
SODIUM SERPL-SCNC: 141 MMOL/L (ref 136–145)
T4 FREE SERPL-MCNC: 1.57 NG/DL (ref 0.93–1.7)
TIBC SERPL-MCNC: 454 MCG/DL (ref 298–536)
TRANSFERRIN SERPL-MCNC: 305 MG/DL (ref 200–360)
TSH SERPL DL<=0.05 MIU/L-ACNC: 3.67 UIU/ML (ref 0.27–4.2)
URATE SERPL-MCNC: 8.8 MG/DL (ref 2.4–5.7)
WBC # BLD AUTO: 6.99 10*3/MM3 (ref 3.4–10.8)

## 2021-01-08 PROCEDURE — 84443 ASSAY THYROID STIM HORMONE: CPT | Performed by: INTERNAL MEDICINE

## 2021-01-08 PROCEDURE — 83970 ASSAY OF PARATHORMONE: CPT | Performed by: INTERNAL MEDICINE

## 2021-01-08 PROCEDURE — 85027 COMPLETE CBC AUTOMATED: CPT | Performed by: INTERNAL MEDICINE

## 2021-01-08 PROCEDURE — 80069 RENAL FUNCTION PANEL: CPT | Performed by: INTERNAL MEDICINE

## 2021-01-08 PROCEDURE — 82310 ASSAY OF CALCIUM: CPT | Performed by: INTERNAL MEDICINE

## 2021-01-08 PROCEDURE — 85045 AUTOMATED RETICULOCYTE COUNT: CPT | Performed by: INTERNAL MEDICINE

## 2021-01-08 PROCEDURE — 83540 ASSAY OF IRON: CPT | Performed by: INTERNAL MEDICINE

## 2021-01-08 PROCEDURE — 84550 ASSAY OF BLOOD/URIC ACID: CPT | Performed by: INTERNAL MEDICINE

## 2021-01-08 PROCEDURE — 82306 VITAMIN D 25 HYDROXY: CPT | Performed by: INTERNAL MEDICINE

## 2021-01-08 PROCEDURE — 83735 ASSAY OF MAGNESIUM: CPT | Performed by: INTERNAL MEDICINE

## 2021-01-08 PROCEDURE — 25010000002 EPOETIN ALFA PER 1000 UNITS: Performed by: NURSE PRACTITIONER

## 2021-01-08 PROCEDURE — 84439 ASSAY OF FREE THYROXINE: CPT | Performed by: INTERNAL MEDICINE

## 2021-01-08 PROCEDURE — 84466 ASSAY OF TRANSFERRIN: CPT | Performed by: INTERNAL MEDICINE

## 2021-01-08 PROCEDURE — 82728 ASSAY OF FERRITIN: CPT | Performed by: INTERNAL MEDICINE

## 2021-01-08 PROCEDURE — 36415 COLL VENOUS BLD VENIPUNCTURE: CPT

## 2021-01-08 PROCEDURE — 96372 THER/PROPH/DIAG INJ SC/IM: CPT

## 2021-01-08 RX ADMIN — ERYTHROPOIETIN 20000 UNITS: 20000 INJECTION, SOLUTION INTRAVENOUS; SUBCUTANEOUS at 10:28

## 2021-02-03 ENCOUNTER — TELEPHONE (OUTPATIENT)
Dept: ENDOCRINOLOGY | Facility: CLINIC | Age: 66
End: 2021-02-03

## 2021-02-03 NOTE — TELEPHONE ENCOUNTER
Patient called about her Levothyroxine. Said that she would like a new prescription because this one doesn't last long enough for her. Please advise and reorder if possible.

## 2021-02-03 NOTE — TELEPHONE ENCOUNTER
Returned patient call for clarity on message Rx in chart states she takes   50 mcg 2 days a week which is total of 10 per month or 30 days  for 90 for 3month supply Qty of 50 was sent to pharmacy    75 mcg take 5 days a week which is total of 25 for month or 75 for 90 day supply Qty of 90 was sent in    Need to clarify amount a dosage she is taking

## 2021-02-04 ENCOUNTER — HOSPITAL ENCOUNTER (OUTPATIENT)
Dept: INFUSION THERAPY | Facility: HOSPITAL | Age: 66
End: 2021-02-04

## 2021-02-04 RX ORDER — LEVOTHYROXINE SODIUM 0.07 MG/1
75 TABLET ORAL DAILY
Qty: 90 TABLET | Refills: 3 | Status: SHIPPED | OUTPATIENT
Start: 2021-02-04

## 2021-02-04 NOTE — TELEPHONE ENCOUNTER
----- Message from Anny Santos sent at 2/4/2021 12:45 AM EST -----  Regarding: Non-Urgent Medical Question  Contact: [phone]  I need a prescription called in to [facility] here in [place] where I've been taking the 75 mcg every day. Thank you.   Also, I will be at the [facility] tomorrow for my Procrit injection. They will be drawing their regular labs. So if you want/need any labs (ie: TSH or FREE T4) just put them in and they will draw them. Thank you so much. Adia Santos

## 2021-02-05 ENCOUNTER — APPOINTMENT (OUTPATIENT)
Dept: INFUSION THERAPY | Facility: HOSPITAL | Age: 66
End: 2021-02-05

## 2021-02-05 ENCOUNTER — HOSPITAL ENCOUNTER (OUTPATIENT)
Dept: INFUSION THERAPY | Facility: HOSPITAL | Age: 66
Discharge: HOME | End: 2021-02-05
Admitting: NURSE PRACTITIONER

## 2021-02-05 VITALS
RESPIRATION RATE: 18 BRPM | TEMPERATURE: 98.2 F | BODY MASS INDEX: 42.6 KG/M2 | SYSTOLIC BLOOD PRESSURE: 116 MMHG | WEIGHT: 256 LBS | HEART RATE: 81 BPM | OXYGEN SATURATION: 97 % | DIASTOLIC BLOOD PRESSURE: 47 MMHG

## 2021-02-05 DIAGNOSIS — D63.1 ANEMIA OF CHRONIC RENAL FAILURE, STAGE 4 (SEVERE) (HCC): Primary | ICD-10-CM

## 2021-02-05 DIAGNOSIS — N18.4 ANEMIA OF CHRONIC RENAL FAILURE, STAGE 4 (SEVERE) (HCC): Primary | ICD-10-CM

## 2021-02-05 DIAGNOSIS — N18.4 CHRONIC KIDNEY DISEASE, STAGE IV (SEVERE) (HCC): ICD-10-CM

## 2021-02-05 LAB
DEPRECATED RDW RBC AUTO: 53.8 FL (ref 37–54)
ERYTHROCYTE [DISTWIDTH] IN BLOOD BY AUTOMATED COUNT: 13.6 % (ref 12.3–15.4)
HCT VFR BLD AUTO: 33.5 % (ref 34–46.6)
HGB BLD-MCNC: 10.6 G/DL (ref 12–15.9)
MCH RBC QN AUTO: 34.1 PG (ref 26.6–33)
MCHC RBC AUTO-ENTMCNC: 31.6 G/DL (ref 31.5–35.7)
MCV RBC AUTO: 107.7 FL (ref 79–97)
PLATELET # BLD AUTO: 152 10*3/MM3 (ref 140–450)
PMV BLD AUTO: 8.9 FL (ref 6–12)
RBC # BLD AUTO: 3.11 10*6/MM3 (ref 3.77–5.28)
WBC # BLD AUTO: 7.65 10*3/MM3 (ref 3.4–10.8)

## 2021-02-05 PROCEDURE — 36415 COLL VENOUS BLD VENIPUNCTURE: CPT

## 2021-02-05 PROCEDURE — 85027 COMPLETE CBC AUTOMATED: CPT | Performed by: NURSE PRACTITIONER

## 2021-02-05 PROCEDURE — 25010000002 EPOETIN ALFA PER 1000 UNITS: Performed by: NURSE PRACTITIONER

## 2021-02-05 PROCEDURE — 96372 THER/PROPH/DIAG INJ SC/IM: CPT

## 2021-02-05 RX ADMIN — ERYTHROPOIETIN 20000 UNITS: 20000 INJECTION, SOLUTION INTRAVENOUS; SUBCUTANEOUS at 13:31

## 2021-03-05 ENCOUNTER — HOSPITAL ENCOUNTER (OUTPATIENT)
Dept: INFUSION THERAPY | Facility: HOSPITAL | Age: 66
Discharge: HOME | End: 2021-03-05
Admitting: NURSE PRACTITIONER

## 2021-03-05 VITALS
WEIGHT: 256 LBS | RESPIRATION RATE: 18 BRPM | HEIGHT: 65 IN | BODY MASS INDEX: 42.65 KG/M2 | OXYGEN SATURATION: 96 % | HEART RATE: 79 BPM | SYSTOLIC BLOOD PRESSURE: 126 MMHG | TEMPERATURE: 98.2 F | DIASTOLIC BLOOD PRESSURE: 58 MMHG

## 2021-03-05 DIAGNOSIS — N18.4 CHRONIC KIDNEY DISEASE, STAGE IV (SEVERE) (HCC): ICD-10-CM

## 2021-03-05 DIAGNOSIS — D63.1 ANEMIA OF CHRONIC RENAL FAILURE, STAGE 4 (SEVERE) (HCC): Primary | ICD-10-CM

## 2021-03-05 DIAGNOSIS — N18.4 ANEMIA OF CHRONIC RENAL FAILURE, STAGE 4 (SEVERE) (HCC): Primary | ICD-10-CM

## 2021-03-05 LAB
DEPRECATED RDW RBC AUTO: 54.4 FL (ref 37–54)
ERYTHROCYTE [DISTWIDTH] IN BLOOD BY AUTOMATED COUNT: 13.6 % (ref 12.3–15.4)
HCT VFR BLD AUTO: 34.2 % (ref 34–46.6)
HGB BLD-MCNC: 10.9 G/DL (ref 12–15.9)
MCH RBC QN AUTO: 34.4 PG (ref 26.6–33)
MCHC RBC AUTO-ENTMCNC: 31.9 G/DL (ref 31.5–35.7)
MCV RBC AUTO: 107.9 FL (ref 79–97)
PLATELET # BLD AUTO: 144 10*3/MM3 (ref 140–450)
PMV BLD AUTO: 8.9 FL (ref 6–12)
RBC # BLD AUTO: 3.17 10*6/MM3 (ref 3.77–5.28)
WBC # BLD AUTO: 8.22 10*3/MM3 (ref 3.4–10.8)

## 2021-03-05 PROCEDURE — 85027 COMPLETE CBC AUTOMATED: CPT | Performed by: NURSE PRACTITIONER

## 2021-03-05 PROCEDURE — 96372 THER/PROPH/DIAG INJ SC/IM: CPT

## 2021-03-05 PROCEDURE — 36415 COLL VENOUS BLD VENIPUNCTURE: CPT

## 2021-03-05 PROCEDURE — 25010000002 EPOETIN ALFA PER 1000 UNITS: Performed by: NURSE PRACTITIONER

## 2021-03-05 RX ADMIN — ERYTHROPOIETIN 20000 UNITS: 20000 INJECTION, SOLUTION INTRAVENOUS; SUBCUTANEOUS at 13:26

## 2021-03-30 ENCOUNTER — OFFICE VISIT (OUTPATIENT)
Dept: PULMONOLOGY | Facility: CLINIC | Age: 66
End: 2021-03-30

## 2021-03-30 VITALS
HEIGHT: 65 IN | DIASTOLIC BLOOD PRESSURE: 72 MMHG | OXYGEN SATURATION: 87 % | WEIGHT: 259.2 LBS | HEART RATE: 85 BPM | BODY MASS INDEX: 43.18 KG/M2 | SYSTOLIC BLOOD PRESSURE: 120 MMHG

## 2021-03-30 DIAGNOSIS — J44.9 CHRONIC OBSTRUCTIVE PULMONARY DISEASE, UNSPECIFIED COPD TYPE (HCC): ICD-10-CM

## 2021-03-30 DIAGNOSIS — J41.0 CHRONIC BRONCHITIS, SIMPLE (HCC): ICD-10-CM

## 2021-03-30 DIAGNOSIS — Z87.891 PERSONAL HISTORY OF TOBACCO USE, PRESENTING HAZARDS TO HEALTH: ICD-10-CM

## 2021-03-30 DIAGNOSIS — R06.02 SHORTNESS OF BREATH: ICD-10-CM

## 2021-03-30 DIAGNOSIS — R06.83 SNORING: Primary | ICD-10-CM

## 2021-03-30 DIAGNOSIS — G47.19 EXCESSIVE DAYTIME SLEEPINESS: ICD-10-CM

## 2021-03-30 DIAGNOSIS — E66.01 MORBID OBESITY, UNSPECIFIED OBESITY TYPE (HCC): ICD-10-CM

## 2021-03-30 DIAGNOSIS — R09.02 HYPOXIA: ICD-10-CM

## 2021-03-30 DIAGNOSIS — G47.33 OBSTRUCTIVE SLEEP APNEA: ICD-10-CM

## 2021-03-30 PROCEDURE — 99204 OFFICE O/P NEW MOD 45 MIN: CPT | Performed by: INTERNAL MEDICINE

## 2021-03-30 RX ORDER — BUDESONIDE 0.5 MG/2ML
0.5 INHALANT ORAL 2 TIMES DAILY
Qty: 120 ML | Refills: 5 | Status: SHIPPED | OUTPATIENT
Start: 2021-03-30

## 2021-03-30 RX ORDER — FOLIC ACID 1 MG/1
5 TABLET ORAL
COMMUNITY
Start: 2020-11-16

## 2021-03-30 RX ORDER — PANTOPRAZOLE SODIUM 40 MG/1
40 TABLET, DELAYED RELEASE ORAL
COMMUNITY
Start: 2020-11-16

## 2021-03-30 RX ORDER — FORMOTEROL FUMARATE DIHYDRATE 20 UG/2ML
20 SOLUTION RESPIRATORY (INHALATION)
Qty: 120 ML | Refills: 5 | Status: SHIPPED | OUTPATIENT
Start: 2021-03-30

## 2021-03-30 RX ORDER — TIOTROPIUM BROMIDE INHALATION SPRAY 1.56 UG/1
1 SPRAY, METERED RESPIRATORY (INHALATION)
COMMUNITY
Start: 2020-11-16

## 2021-03-30 NOTE — PROGRESS NOTES
"  CONSULT NOTE    Requested by:   Dileep Falk MD, SAGE Moreno, Mahendra LAGUNAS MD      Chief Complaint   Patient presents with   • Consult   • Sleeping Problem       Subjective:  Anny Santos is a 65 y.o. female.     History of Present Illness   Patient came in today for evaluation of possible sleep apnea. Patient says that for the past few years she snores loudly and has woken up in the middle of the night gasping for breath and sometimes with a choking sensation. Also, the patient's family notes that she has occasional pauses in the breathing.     she feels that she doesn't get restful night sleep and her quality has diminished considerably. she does feel sleepy occasionally.      she is not complaining of occasional headaches.     There is known family history of sleep apnea, in her mother.      her Epsworth Sleepiness score was 4-5 /24.     Patient suffers from hypertension &     she drinks 2-3 cups/cans of caffeinated drinks per day.    Upon questioning she is complaining of shortness of breath. Patient says that for the past few years, she has had shortness of breath. Patient has had decreased exercise capacity that has been progressive for the past few years. Patient also says that she brings up phlegm in the morning along with cough.     she also reports having 3-4 recent episodes of \"bronchitis\", over the past many years.    Patient also notes having progressive worsening in his ability to walk up the hill or walk up a flight of stairs.     she is currently on oxygen. She was started on oxygen by a pulmonologist 3-4 years ago.    Patient reports smoking 3/4 of a pack per day for the past 40 years.  she quit smoking in November 2014. She also lived in a house hold of smoker.     Patient does have a family history of lung diseases, including her mother who had lung cancer and COPD.        The following portions of the patient's history were reviewed and updated as appropriate: allergies, current medications, " past family history, past medical history, past social history and past surgical history.    Review of Systems   Constitutional: Positive for fatigue. Negative for chills and fever.   HENT: Positive for rhinorrhea and sneezing. Negative for sinus pressure, sinus pain and sore throat.    Respiratory: Positive for cough, shortness of breath and wheezing. Negative for chest tightness.    Cardiovascular: Positive for palpitations and leg swelling.   Psychiatric/Behavioral: Negative for sleep disturbance.   All other systems reviewed and are negative.      Past Medical History:   Diagnosis Date   • Abnormal finding on lung imaging    • Adrenal cortical adenoma     A.  3.5 cm water density left adrenal mass consistent with adenoma noted on CT scan of   2013, unchanged in size from previous scans. Labs 2015 - normal cortisol and cats/mets   • Allergic rhinitis    • Anemia    • Arthritis    • Asthma, extrinsic    • Atherosclerosis    • Backache    • Cholelithiasis     2 cm densely calcified gallstone incidentally noted on CT scan of the chest 2013, asymptomatic.   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    • H/O transfusion of whole blood    • Hypertension    • Impaired fasting glucose    • Ingrown nail    • Iron deficiency    • Lower extremity edema    • Muscle spasm    • Myopia    • Nephrolithiasis    • Osteoarthritis    • Palpitations    • Paroxysmal supraventricular tachycardia (CMS/HCC)    • Pneumonia    • Sinus problem    • Skin lesion of left lower extremity    • Vitamin B12 deficiency        Social History     Tobacco Use   • Smoking status: Former Smoker     Types: Cigarettes     Quit date: 2014     Years since quittin.2   • Smokeless tobacco: Never Used   • Tobacco comment: quit in   "  Substance Use Topics   • Alcohol use: No         Objective:  Visit Vitals  /72   Pulse 85   Ht 165.1 cm (65\")   Wt 118 kg (259 lb 3.2 oz)   SpO2 (!) 87% Comment: on RA at rest   BMI 43.13 kg/m²   O2Sat: 94% on 2LPM at rest.     Physical Exam  Vitals reviewed.   Constitutional:       Appearance: She is well-developed.   HENT:      Head: Atraumatic.      Mouth/Throat:      Comments: Oropharynx was crowded.  Eyes:      Pupils: Pupils are equal, round, and reactive to light.   Neck:      Thyroid: No thyromegaly.      Vascular: No JVD.      Trachea: No tracheal deviation.      Comments: Increased adipose tissue.   Cardiovascular:      Rate and Rhythm: Normal rate.      Comments: Occasionally irregular.   PPM noted.   Pulmonary:      Effort: Pulmonary effort is normal. No respiratory distress.      Breath sounds: Normal breath sounds. No wheezing.      Comments: Somewhat hyperresonant to percussion.  Somewhat decreased air entry.  Mild scattered wheezing noted.   Musculoskeletal:         General: Normal range of motion.      Comments: Used a walker.    Skin:     General: Skin is warm and dry.   Neurological:      Mental Status: She is alert and oriented to person, place, and time.   Psychiatric:         Behavior: Behavior normal.         Assessment/Plan:  Diagnoses and all orders for this visit:    1. Snoring (Primary)  -     Polysomnography 4 or More Parameters; Future    2. Excessive daytime sleepiness  -     Polysomnography 4 or More Parameters; Future    3. Obstructive sleep apnea  -     Polysomnography 4 or More Parameters; Future    4. Morbid obesity, unspecified obesity type (CMS/HCC)  -     Polysomnography 4 or More Parameters; Future    5. Shortness of breath  -     Pulmonary Function Test; Future  -     Polysomnography 4 or More Parameters; Future    6. Chronic bronchitis, simple (CMS/HCC)  -     Polysomnography 4 or More Parameters; Future    7. Chronic obstructive pulmonary disease, unspecified COPD " type (CMS/[facility])  -     Pulmonary Function Test; Future  -     Polysomnography 4 or More Parameters; Future    8. Personal history of tobacco use, presenting hazards to health  -      CT Chest Low Dose Cancer Screening WO; Future    9. Hypoxia    Other orders  -     budesonide (Pulmicort) 0.5 MG/2ML nebulizer solution; Take 2 mL by nebulization 2 (Two) Times a Day.  Dispense: 120 mL; Refill: 5  -     formoterol (Perforomist) 20 MCG/2ML nebulizer solution; Take 2 mL by nebulization 2 (Two) Times a Day.  Dispense: 120 mL; Refill: 5        Return in about 3 months (around 6/30/2021) for Recheck, PFT F/U, Imaging, Sleep study, For Leia.    DISCUSSION(if any):  Last chest x-ray was reviewed personally and the results were shared with the patient.  Images reviewed personally.   Results for orders placed during the hospital encounter of 11/26/19    XR Chest 2 View    Narrative  PROCEDURE: XR CHEST 2 VW-    HISTORY: Drug aadverse effect; Z79.899-Other long term (current) drug  therapy    COMPARISON: 05/31/2019.    FINDINGS: A left subclavian pacemaker is in place. The heart is  enlarged. The mediastinum is unremarkable. There is interstitial  pulmonary edema. No focal opacities were effusions are evident. There is  no pneumothorax. There are no acute osseous abnormalities.    Impression  Cardiomegaly and interstitial pulmonary edema.      This report was finalized on 11/26/2019 12:02 PM by Sussy Freeman M.D.      I have also reviewed her provider's office note that mentions suspected sleep apnea along with mention of possible pulmonary hypertension?.     ==========================  ===========================    PFTs were ordered for follow up visit.     Laboratory workup was also reviewed which showed   Lab Results   Component Value Date    HGB 10.9 (L) 03/05/2021    HGB 10.6 (L) 02/05/2021    HGB 10.7 (L) 01/08/2021   ,    Lab Results   Component Value Date    EOSABS 0.09 12/04/2020    EOSABS 0.08 05/04/2020     EOSABS 0.09 02/20/2020    & Laboratory workup also showed   Lab Results   Component Value Date    CO2 34.0 (H) 01/08/2021     ===========================  ===========================    Sleep questionnaire was provided to the patient    The pathophysiology of sleep apnea was discussed, with her.     We will encourage her to schedule the sleep study soon.     The patient will definitely need to be considered for an in lab study due to COPD, s/p PPM, oxygen use among other reasons.  It should be noted that a home sleep study is likely to underestimate the true AHI and is unable to assess sleep stages and abnormal sleep behaviors etc. The patient has understood.    The patient is agreeable to try CPAP/BiPAP, if needed.     Patient was educated on good sleep hygiene measures and voiced understanding of the same.     Patient was given reading material regarding sleep apnea.    Orders as above    Based on history and physical exam, it seems that her shortness of breath is most likely from obstructive lung disease.     Since her insurance doesn't really cover inhalers, we will try nebulized Pulmicort and Performist.     Patient was educated on compliance with, and the correct method of using the pulmonary medicines.     Side effects, of prescribed medicines, discussed.    I have told her that we will made further recommendations, based on clinical response.     The patient belongs to the risk group for which lung cancer screening has been recommended. We will try to make arrangements for the same and this will be indicated soon. This has been ordered.    Patient was given reading material, as appropriate.     The patient was advised to continue oxygen 24/7, since she has noticed improvement in some symptoms since using it as prescribed.    I have encouraged the patient to call or schedule a visit earlier, if there are any concerns.        Dictated utilizing Dragon dictation.    This document was electronically signed by  Dion Acuna MD on 03/30/21 at 12:10 EDT

## 2021-04-02 ENCOUNTER — HOSPITAL ENCOUNTER (OUTPATIENT)
Dept: INFUSION THERAPY | Facility: HOSPITAL | Age: 66
Discharge: HOME | End: 2021-04-02
Admitting: NURSE PRACTITIONER

## 2021-04-02 ENCOUNTER — TRANSCRIBE ORDERS (OUTPATIENT)
Dept: ONCOLOGY | Facility: HOSPITAL | Age: 66
End: 2021-04-02

## 2021-04-02 VITALS
RESPIRATION RATE: 18 BRPM | SYSTOLIC BLOOD PRESSURE: 112 MMHG | HEART RATE: 84 BPM | DIASTOLIC BLOOD PRESSURE: 59 MMHG | TEMPERATURE: 98.4 F | OXYGEN SATURATION: 97 %

## 2021-04-02 DIAGNOSIS — N18.4 CHRONIC KIDNEY DISEASE, STAGE IV (SEVERE) (HCC): ICD-10-CM

## 2021-04-02 DIAGNOSIS — D63.1 ANEMIA OF CHRONIC RENAL FAILURE, STAGE 4 (SEVERE) (HCC): Primary | ICD-10-CM

## 2021-04-02 DIAGNOSIS — N18.4 ANEMIA OF CHRONIC RENAL FAILURE, STAGE 4 (SEVERE) (HCC): Primary | ICD-10-CM

## 2021-04-02 LAB
ALBUMIN SERPL-MCNC: 3.9 G/DL (ref 3.5–5.2)
ANION GAP SERPL CALCULATED.3IONS-SCNC: 4.4 MMOL/L (ref 5–15)
BUN SERPL-MCNC: 19 MG/DL (ref 8–23)
BUN/CREAT SERPL: 16 (ref 7–25)
CALCIUM SPEC-SCNC: 10.4 MG/DL (ref 8.6–10.5)
CHLORIDE SERPL-SCNC: 93 MMOL/L (ref 98–107)
CO2 SERPL-SCNC: 39.6 MMOL/L (ref 22–29)
CREAT SERPL-MCNC: 1.19 MG/DL (ref 0.57–1)
DEPRECATED RDW RBC AUTO: 53.5 FL (ref 37–54)
ERYTHROCYTE [DISTWIDTH] IN BLOOD BY AUTOMATED COUNT: 13.6 % (ref 12.3–15.4)
GFR SERPL CREATININE-BSD FRML MDRD: 45 ML/MIN/1.73
GLUCOSE SERPL-MCNC: 162 MG/DL (ref 65–99)
HCT VFR BLD AUTO: 36 % (ref 34–46.6)
HGB BLD-MCNC: 11.6 G/DL (ref 12–15.9)
MCH RBC QN AUTO: 34.3 PG (ref 26.6–33)
MCHC RBC AUTO-ENTMCNC: 32.2 G/DL (ref 31.5–35.7)
MCV RBC AUTO: 106.5 FL (ref 79–97)
PHOSPHATE SERPL-MCNC: 2.6 MG/DL (ref 2.5–4.5)
PLATELET # BLD AUTO: 139 10*3/MM3 (ref 140–450)
PMV BLD AUTO: 9.2 FL (ref 6–12)
POTASSIUM SERPL-SCNC: 4.1 MMOL/L (ref 3.5–5.2)
PTH-INTACT SERPL-MCNC: 109 PG/ML (ref 15–65)
RBC # BLD AUTO: 3.38 10*6/MM3 (ref 3.77–5.28)
SODIUM SERPL-SCNC: 137 MMOL/L (ref 136–145)
URATE SERPL-MCNC: 9.6 MG/DL (ref 2.4–5.7)
WBC # BLD AUTO: 6.74 10*3/MM3 (ref 3.4–10.8)

## 2021-04-02 PROCEDURE — 80069 RENAL FUNCTION PANEL: CPT | Performed by: NURSE PRACTITIONER

## 2021-04-02 PROCEDURE — 36415 COLL VENOUS BLD VENIPUNCTURE: CPT

## 2021-04-02 PROCEDURE — 85027 COMPLETE CBC AUTOMATED: CPT | Performed by: NURSE PRACTITIONER

## 2021-04-02 PROCEDURE — 84550 ASSAY OF BLOOD/URIC ACID: CPT | Performed by: NURSE PRACTITIONER

## 2021-04-02 PROCEDURE — 83970 ASSAY OF PARATHORMONE: CPT | Performed by: NURSE PRACTITIONER

## 2021-04-21 ENCOUNTER — APPOINTMENT (OUTPATIENT)
Dept: SLEEP MEDICINE | Facility: HOSPITAL | Age: 66
End: 2021-04-21

## 2021-04-30 ENCOUNTER — HOSPITAL ENCOUNTER (OUTPATIENT)
Dept: INFUSION THERAPY | Facility: HOSPITAL | Age: 66
Discharge: HOME | End: 2021-04-30
Admitting: INTERNAL MEDICINE

## 2021-04-30 VITALS
DIASTOLIC BLOOD PRESSURE: 51 MMHG | OXYGEN SATURATION: 98 % | SYSTOLIC BLOOD PRESSURE: 110 MMHG | TEMPERATURE: 98.4 F | RESPIRATION RATE: 18 BRPM | HEART RATE: 69 BPM

## 2021-04-30 DIAGNOSIS — D63.1 ANEMIA OF CHRONIC RENAL FAILURE, STAGE 4 (SEVERE) (HCC): Primary | ICD-10-CM

## 2021-04-30 DIAGNOSIS — N18.4 CHRONIC KIDNEY DISEASE, STAGE IV (SEVERE) (HCC): ICD-10-CM

## 2021-04-30 DIAGNOSIS — N18.4 ANEMIA OF CHRONIC RENAL FAILURE, STAGE 4 (SEVERE) (HCC): Primary | ICD-10-CM

## 2021-04-30 LAB
DEPRECATED RDW RBC AUTO: 55.2 FL (ref 37–54)
ERYTHROCYTE [DISTWIDTH] IN BLOOD BY AUTOMATED COUNT: 14 % (ref 12.3–15.4)
HCT VFR BLD AUTO: 33.7 % (ref 34–46.6)
HGB BLD-MCNC: 10.8 G/DL (ref 12–15.9)
IRON 24H UR-MRATE: 65 MCG/DL (ref 37–145)
IRON SATN MFR SERPL: 15 % (ref 20–50)
MCH RBC QN AUTO: 34.5 PG (ref 26.6–33)
MCHC RBC AUTO-ENTMCNC: 32 G/DL (ref 31.5–35.7)
MCV RBC AUTO: 107.7 FL (ref 79–97)
PLATELET # BLD AUTO: 150 10*3/MM3 (ref 140–450)
PMV BLD AUTO: 8.9 FL (ref 6–12)
RBC # BLD AUTO: 3.13 10*6/MM3 (ref 3.77–5.28)
TIBC SERPL-MCNC: 438 MCG/DL (ref 298–536)
TRANSFERRIN SERPL-MCNC: 294 MG/DL (ref 200–360)
WBC # BLD AUTO: 7.27 10*3/MM3 (ref 3.4–10.8)

## 2021-04-30 PROCEDURE — 96372 THER/PROPH/DIAG INJ SC/IM: CPT

## 2021-04-30 PROCEDURE — 25010000002 EPOETIN ALFA PER 1000 UNITS: Performed by: NURSE PRACTITIONER

## 2021-04-30 PROCEDURE — 83540 ASSAY OF IRON: CPT | Performed by: PHYSICIAN ASSISTANT

## 2021-04-30 PROCEDURE — 85027 COMPLETE CBC AUTOMATED: CPT | Performed by: PHYSICIAN ASSISTANT

## 2021-04-30 PROCEDURE — 36415 COLL VENOUS BLD VENIPUNCTURE: CPT

## 2021-04-30 PROCEDURE — 84466 ASSAY OF TRANSFERRIN: CPT | Performed by: PHYSICIAN ASSISTANT

## 2021-04-30 PROCEDURE — 96374 THER/PROPH/DIAG INJ IV PUSH: CPT

## 2021-04-30 RX ADMIN — ERYTHROPOIETIN 20000 UNITS: 20000 INJECTION, SOLUTION INTRAVENOUS; SUBCUTANEOUS at 13:24

## 2021-05-03 ENCOUNTER — HOSPITAL ENCOUNTER (OUTPATIENT)
Dept: SLEEP MEDICINE | Facility: HOSPITAL | Age: 66
End: 2021-05-03

## 2021-05-17 ENCOUNTER — TRANSCRIBE ORDERS (OUTPATIENT)
Dept: LAB | Facility: HOSPITAL | Age: 66
End: 2021-05-17

## 2021-05-17 DIAGNOSIS — R05.8 COUGH WITH EXPOSURE TO SEVERE ACUTE RESPIRATORY SYNDROME CORONAVIRUS 2 (SARS-COV-2): Primary | ICD-10-CM

## 2021-05-17 DIAGNOSIS — Z20.822 COUGH WITH EXPOSURE TO SEVERE ACUTE RESPIRATORY SYNDROME CORONAVIRUS 2 (SARS-COV-2): Primary | ICD-10-CM

## 2021-05-17 RX ORDER — SODIUM CHLORIDE 9 MG/ML
250 INJECTION, SOLUTION INTRAVENOUS CONTINUOUS
Status: CANCELLED | OUTPATIENT
Start: 2021-05-19

## 2021-05-19 ENCOUNTER — HOSPITAL ENCOUNTER (OUTPATIENT)
Dept: INFUSION THERAPY | Facility: HOSPITAL | Age: 66
Setting detail: INFUSION SERIES
Discharge: HOME | End: 2021-05-19

## 2021-05-24 ENCOUNTER — LAB (OUTPATIENT)
Dept: LAB | Facility: HOSPITAL | Age: 66
End: 2021-05-24

## 2021-05-24 ENCOUNTER — HOSPITAL ENCOUNTER (OUTPATIENT)
Dept: INFUSION THERAPY | Facility: HOSPITAL | Age: 66
Setting detail: INFUSION SERIES
Discharge: HOME | End: 2021-05-24

## 2021-05-24 VITALS
TEMPERATURE: 98.4 F | RESPIRATION RATE: 16 BRPM | SYSTOLIC BLOOD PRESSURE: 136 MMHG | OXYGEN SATURATION: 93 % | DIASTOLIC BLOOD PRESSURE: 57 MMHG | HEART RATE: 80 BPM

## 2021-05-24 DIAGNOSIS — R05.8 COUGH WITH EXPOSURE TO SEVERE ACUTE RESPIRATORY SYNDROME CORONAVIRUS 2 (SARS-COV-2): ICD-10-CM

## 2021-05-24 DIAGNOSIS — N18.4 CHRONIC KIDNEY DISEASE, STAGE IV (SEVERE) (HCC): ICD-10-CM

## 2021-05-24 DIAGNOSIS — D63.1 ANEMIA OF CHRONIC RENAL FAILURE, STAGE 4 (SEVERE) (HCC): Primary | ICD-10-CM

## 2021-05-24 DIAGNOSIS — Z20.822 COUGH WITH EXPOSURE TO SEVERE ACUTE RESPIRATORY SYNDROME CORONAVIRUS 2 (SARS-COV-2): ICD-10-CM

## 2021-05-24 DIAGNOSIS — N18.4 ANEMIA OF CHRONIC RENAL FAILURE, STAGE 4 (SEVERE) (HCC): Primary | ICD-10-CM

## 2021-05-24 PROCEDURE — U0004 COV-19 TEST NON-CDC HGH THRU: HCPCS

## 2021-05-24 PROCEDURE — 96365 THER/PROPH/DIAG IV INF INIT: CPT

## 2021-05-24 PROCEDURE — 25010000002 FERRIC CARBOXYMALTOSE 750 MG/15ML SOLUTION 15 ML VIAL: Performed by: NURSE PRACTITIONER

## 2021-05-24 PROCEDURE — U0005 INFEC AGEN DETEC AMPLI PROBE: HCPCS

## 2021-05-24 RX ORDER — SODIUM CHLORIDE 9 MG/ML
250 INJECTION, SOLUTION INTRAVENOUS CONTINUOUS
Status: CANCELLED | OUTPATIENT
Start: 2021-06-07

## 2021-05-24 RX ORDER — SODIUM CHLORIDE 9 MG/ML
250 INJECTION, SOLUTION INTRAVENOUS CONTINUOUS
Status: DISCONTINUED | OUTPATIENT
Start: 2021-05-24 | End: 2021-05-26 | Stop reason: HOSPADM

## 2021-05-24 RX ADMIN — FERRIC CARBOXYMALTOSE INJECTION 750 MG: 50 INJECTION, SOLUTION INTRAVENOUS at 13:38

## 2021-05-25 LAB — SARS-COV-2 RNA NOSE QL NAA+PROBE: NOT DETECTED

## 2021-06-07 ENCOUNTER — HOSPITAL ENCOUNTER (OUTPATIENT)
Dept: INFUSION THERAPY | Facility: HOSPITAL | Age: 66
Setting detail: INFUSION SERIES
Discharge: HOME | End: 2021-06-07

## 2021-06-07 VITALS
OXYGEN SATURATION: 93 % | DIASTOLIC BLOOD PRESSURE: 56 MMHG | TEMPERATURE: 98.1 F | SYSTOLIC BLOOD PRESSURE: 120 MMHG | RESPIRATION RATE: 18 BRPM | HEART RATE: 77 BPM

## 2021-06-07 DIAGNOSIS — N18.4 ANEMIA OF CHRONIC RENAL FAILURE, STAGE 4 (SEVERE) (HCC): Primary | ICD-10-CM

## 2021-06-07 DIAGNOSIS — D63.1 ANEMIA OF CHRONIC RENAL FAILURE, STAGE 4 (SEVERE) (HCC): Primary | ICD-10-CM

## 2021-06-07 DIAGNOSIS — N18.4 CHRONIC KIDNEY DISEASE, STAGE IV (SEVERE) (HCC): ICD-10-CM

## 2021-06-07 LAB
ALBUMIN SERPL-MCNC: 3.7 G/DL (ref 3.5–5.2)
ANION GAP SERPL CALCULATED.3IONS-SCNC: 6 MMOL/L (ref 5–15)
BUN SERPL-MCNC: 23 MG/DL (ref 8–23)
BUN/CREAT SERPL: 18.1 (ref 7–25)
CALCIUM SPEC-SCNC: 10 MG/DL (ref 8.6–10.5)
CALCIUM SPEC-SCNC: 9.4 MG/DL (ref 8.6–10.5)
CHLORIDE SERPL-SCNC: 96 MMOL/L (ref 98–107)
CO2 SERPL-SCNC: 38 MMOL/L (ref 22–29)
CREAT SERPL-MCNC: 1.27 MG/DL (ref 0.57–1)
DEPRECATED RDW RBC AUTO: 54.4 FL (ref 37–54)
ERYTHROCYTE [DISTWIDTH] IN BLOOD BY AUTOMATED COUNT: 13.4 % (ref 12.3–15.4)
GFR SERPL CREATININE-BSD FRML MDRD: 42 ML/MIN/1.73
GLUCOSE SERPL-MCNC: 122 MG/DL (ref 65–99)
HCT VFR BLD AUTO: 35 % (ref 34–46.6)
HGB BLD-MCNC: 10.9 G/DL (ref 12–15.9)
IRON 24H UR-MRATE: 95 MCG/DL (ref 37–145)
IRON SATN MFR SERPL: 25 % (ref 20–50)
MCH RBC QN AUTO: 34.1 PG (ref 26.6–33)
MCHC RBC AUTO-ENTMCNC: 31.1 G/DL (ref 31.5–35.7)
MCV RBC AUTO: 109.4 FL (ref 79–97)
PHOSPHATE SERPL-MCNC: 3.4 MG/DL (ref 2.5–4.5)
PLATELET # BLD AUTO: 130 10*3/MM3 (ref 140–450)
PMV BLD AUTO: 9.1 FL (ref 6–12)
POTASSIUM SERPL-SCNC: 4.1 MMOL/L (ref 3.5–5.2)
PTH-INTACT SERPL-MCNC: 163 PG/ML (ref 15–65)
RBC # BLD AUTO: 3.2 10*6/MM3 (ref 3.77–5.28)
SODIUM SERPL-SCNC: 140 MMOL/L (ref 136–145)
TIBC SERPL-MCNC: 384 MCG/DL (ref 298–536)
TRANSFERRIN SERPL-MCNC: 258 MG/DL (ref 200–360)
URATE SERPL-MCNC: 8.4 MG/DL (ref 2.4–5.7)
WBC # BLD AUTO: 6.88 10*3/MM3 (ref 3.4–10.8)

## 2021-06-07 PROCEDURE — 82310 ASSAY OF CALCIUM: CPT | Performed by: NURSE PRACTITIONER

## 2021-06-07 PROCEDURE — 83540 ASSAY OF IRON: CPT | Performed by: NURSE PRACTITIONER

## 2021-06-07 PROCEDURE — 96374 THER/PROPH/DIAG INJ IV PUSH: CPT

## 2021-06-07 PROCEDURE — 83970 ASSAY OF PARATHORMONE: CPT | Performed by: NURSE PRACTITIONER

## 2021-06-07 PROCEDURE — 80069 RENAL FUNCTION PANEL: CPT | Performed by: NURSE PRACTITIONER

## 2021-06-07 PROCEDURE — 85027 COMPLETE CBC AUTOMATED: CPT | Performed by: NURSE PRACTITIONER

## 2021-06-07 PROCEDURE — 25010000002 FERRIC CARBOXYMALTOSE 750 MG/15ML SOLUTION 15 ML VIAL: Performed by: NURSE PRACTITIONER

## 2021-06-07 PROCEDURE — 84466 ASSAY OF TRANSFERRIN: CPT | Performed by: NURSE PRACTITIONER

## 2021-06-07 PROCEDURE — 84550 ASSAY OF BLOOD/URIC ACID: CPT | Performed by: NURSE PRACTITIONER

## 2021-06-07 RX ORDER — SODIUM CHLORIDE 9 MG/ML
250 INJECTION, SOLUTION INTRAVENOUS CONTINUOUS
Status: CANCELLED | OUTPATIENT
Start: 2021-06-07

## 2021-06-07 RX ORDER — SODIUM CHLORIDE 9 MG/ML
250 INJECTION, SOLUTION INTRAVENOUS CONTINUOUS
Status: DISCONTINUED | OUTPATIENT
Start: 2021-06-07 | End: 2021-06-09 | Stop reason: HOSPADM

## 2021-06-07 RX ADMIN — FERRIC CARBOXYMALTOSE INJECTION 750 MG: 50 INJECTION, SOLUTION INTRAVENOUS at 14:19

## 2021-06-16 ENCOUNTER — HOSPITAL ENCOUNTER (OUTPATIENT)
Dept: SLEEP MEDICINE | Facility: HOSPITAL | Age: 66
Discharge: HOME | End: 2021-06-16
Admitting: INTERNAL MEDICINE

## 2021-06-16 DIAGNOSIS — G47.33 OBSTRUCTIVE SLEEP APNEA: ICD-10-CM

## 2021-06-16 DIAGNOSIS — G47.19 EXCESSIVE DAYTIME SLEEPINESS: ICD-10-CM

## 2021-06-16 DIAGNOSIS — R06.83 SNORING: ICD-10-CM

## 2021-06-16 DIAGNOSIS — R06.02 SHORTNESS OF BREATH: ICD-10-CM

## 2021-06-16 DIAGNOSIS — J41.0 CHRONIC BRONCHITIS, SIMPLE (HCC): ICD-10-CM

## 2021-06-16 DIAGNOSIS — J44.9 CHRONIC OBSTRUCTIVE PULMONARY DISEASE, UNSPECIFIED COPD TYPE (HCC): ICD-10-CM

## 2021-06-16 DIAGNOSIS — E66.01 MORBID OBESITY, UNSPECIFIED OBESITY TYPE (HCC): ICD-10-CM

## 2021-06-16 PROCEDURE — 95810 POLYSOM 6/> YRS 4/> PARAM: CPT

## 2021-06-16 PROCEDURE — 95810 POLYSOM 6/> YRS 4/> PARAM: CPT | Performed by: INTERNAL MEDICINE

## 2021-06-22 ENCOUNTER — TRANSCRIBE ORDERS (OUTPATIENT)
Dept: LAB | Facility: HOSPITAL | Age: 66
End: 2021-06-22

## 2021-06-22 ENCOUNTER — LAB (OUTPATIENT)
Dept: LAB | Facility: HOSPITAL | Age: 66
End: 2021-06-22

## 2021-06-22 DIAGNOSIS — Z01.818 PRE-OP TESTING: ICD-10-CM

## 2021-06-22 DIAGNOSIS — Z01.818 PRE-OP TESTING: Primary | ICD-10-CM

## 2021-06-22 PROCEDURE — U0005 INFEC AGEN DETEC AMPLI PROBE: HCPCS

## 2021-06-22 PROCEDURE — U0004 COV-19 TEST NON-CDC HGH THRU: HCPCS

## 2021-06-22 PROCEDURE — C9803 HOPD COVID-19 SPEC COLLECT: HCPCS

## 2021-06-23 LAB — SARS-COV-2 RNA NOSE QL NAA+PROBE: NOT DETECTED

## 2021-07-08 DIAGNOSIS — G47.33 OBSTRUCTIVE SLEEP APNEA: Primary | ICD-10-CM

## 2021-07-08 NOTE — PROGRESS NOTES
Called and spoke to patient with the results of her Sleep Study and she is understanding that it showed moderate apnea. She will be set up with AutoPAP 6/16cm. She wants to use PeptiVir.